# Patient Record
Sex: FEMALE | Race: WHITE | NOT HISPANIC OR LATINO | Employment: STUDENT | URBAN - METROPOLITAN AREA
[De-identification: names, ages, dates, MRNs, and addresses within clinical notes are randomized per-mention and may not be internally consistent; named-entity substitution may affect disease eponyms.]

---

## 2017-01-09 ENCOUNTER — GENERIC CONVERSION - ENCOUNTER (OUTPATIENT)
Dept: OTHER | Facility: OTHER | Age: 22
End: 2017-01-09

## 2017-01-17 ENCOUNTER — APPOINTMENT (OUTPATIENT)
Dept: LAB | Facility: MEDICAL CENTER | Age: 22
End: 2017-01-17
Payer: COMMERCIAL

## 2017-01-17 ENCOUNTER — TRANSCRIBE ORDERS (OUTPATIENT)
Dept: ADMINISTRATIVE | Facility: HOSPITAL | Age: 22
End: 2017-01-17

## 2017-01-17 DIAGNOSIS — E03.9 HYPOTHYROIDISM: ICD-10-CM

## 2017-01-17 LAB — TSH SERPL DL<=0.05 MIU/L-ACNC: 2.06 UIU/ML (ref 0.46–3.98)

## 2017-01-17 PROCEDURE — 84443 ASSAY THYROID STIM HORMONE: CPT

## 2017-01-17 PROCEDURE — 36415 COLL VENOUS BLD VENIPUNCTURE: CPT

## 2017-03-18 ENCOUNTER — OFFICE VISIT (OUTPATIENT)
Dept: URGENT CARE | Facility: MEDICAL CENTER | Age: 22
End: 2017-03-18
Payer: COMMERCIAL

## 2017-03-18 PROCEDURE — G0382 LEV 3 HOSP TYPE B ED VISIT: HCPCS

## 2017-03-18 PROCEDURE — S9083 URGENT CARE CENTER GLOBAL: HCPCS

## 2017-05-18 ENCOUNTER — ALLSCRIPTS OFFICE VISIT (OUTPATIENT)
Dept: OTHER | Facility: OTHER | Age: 22
End: 2017-05-18

## 2017-05-18 ENCOUNTER — TRANSCRIBE ORDERS (OUTPATIENT)
Dept: LAB | Facility: CLINIC | Age: 22
End: 2017-05-18

## 2017-05-18 ENCOUNTER — APPOINTMENT (OUTPATIENT)
Dept: LAB | Facility: CLINIC | Age: 22
End: 2017-05-18
Payer: COMMERCIAL

## 2017-05-18 DIAGNOSIS — R53.83 OTHER FATIGUE: ICD-10-CM

## 2017-05-18 LAB
BASOPHILS # BLD AUTO: 0.07 THOUSANDS/ΜL (ref 0–0.1)
BASOPHILS NFR BLD AUTO: 1 % (ref 0–1)
EOSINOPHIL # BLD AUTO: 0.6 THOUSAND/ΜL (ref 0–0.61)
EOSINOPHIL NFR BLD AUTO: 11 % (ref 0–6)
ERYTHROCYTE [DISTWIDTH] IN BLOOD BY AUTOMATED COUNT: 13.1 % (ref 11.6–15.1)
HCT VFR BLD AUTO: 39.4 % (ref 34.8–46.1)
HGB BLD-MCNC: 12.9 G/DL (ref 11.5–15.4)
LYMPHOCYTES # BLD AUTO: 2.59 THOUSANDS/ΜL (ref 0.6–4.47)
LYMPHOCYTES NFR BLD AUTO: 46 % (ref 14–44)
MCH RBC QN AUTO: 28 PG (ref 26.8–34.3)
MCHC RBC AUTO-ENTMCNC: 32.7 G/DL (ref 31.4–37.4)
MCV RBC AUTO: 86 FL (ref 82–98)
MONOCYTES # BLD AUTO: 0.37 THOUSAND/ΜL (ref 0.17–1.22)
MONOCYTES NFR BLD AUTO: 7 % (ref 4–12)
NEUTROPHILS # BLD AUTO: 1.92 THOUSANDS/ΜL (ref 1.85–7.62)
NEUTS SEG NFR BLD AUTO: 35 % (ref 43–75)
NRBC BLD AUTO-RTO: 0 /100 WBCS
PLATELET # BLD AUTO: 392 THOUSANDS/UL (ref 149–390)
PMV BLD AUTO: 9.3 FL (ref 8.9–12.7)
RBC # BLD AUTO: 4.61 MILLION/UL (ref 3.81–5.12)
VIT B12 SERPL-MCNC: 357 PG/ML (ref 100–900)
WBC # BLD AUTO: 5.55 THOUSAND/UL (ref 4.31–10.16)

## 2017-05-18 PROCEDURE — 85025 COMPLETE CBC W/AUTO DIFF WBC: CPT

## 2017-05-18 PROCEDURE — 82607 VITAMIN B-12: CPT

## 2017-05-18 PROCEDURE — 36415 COLL VENOUS BLD VENIPUNCTURE: CPT

## 2017-05-22 ENCOUNTER — GENERIC CONVERSION - ENCOUNTER (OUTPATIENT)
Dept: OTHER | Facility: OTHER | Age: 22
End: 2017-05-22

## 2017-06-12 ENCOUNTER — GENERIC CONVERSION - ENCOUNTER (OUTPATIENT)
Dept: OTHER | Facility: OTHER | Age: 22
End: 2017-06-12

## 2017-07-10 ENCOUNTER — TRANSCRIBE ORDERS (OUTPATIENT)
Dept: ADMINISTRATIVE | Facility: HOSPITAL | Age: 22
End: 2017-07-10

## 2017-07-10 ENCOUNTER — APPOINTMENT (OUTPATIENT)
Dept: LAB | Facility: MEDICAL CENTER | Age: 22
End: 2017-07-10
Payer: COMMERCIAL

## 2017-07-10 DIAGNOSIS — E03.9 HYPOTHYROIDISM: ICD-10-CM

## 2017-07-10 LAB — TSH SERPL DL<=0.05 MIU/L-ACNC: 0.7 UIU/ML (ref 0.36–3.74)

## 2017-07-10 PROCEDURE — 36415 COLL VENOUS BLD VENIPUNCTURE: CPT

## 2017-07-10 PROCEDURE — 84443 ASSAY THYROID STIM HORMONE: CPT

## 2017-07-11 ENCOUNTER — GENERIC CONVERSION - ENCOUNTER (OUTPATIENT)
Dept: OTHER | Facility: OTHER | Age: 22
End: 2017-07-11

## 2017-07-12 ENCOUNTER — ALLSCRIPTS OFFICE VISIT (OUTPATIENT)
Dept: OTHER | Facility: OTHER | Age: 22
End: 2017-07-12

## 2017-11-08 ENCOUNTER — GENERIC CONVERSION - ENCOUNTER (OUTPATIENT)
Dept: OTHER | Facility: OTHER | Age: 22
End: 2017-11-08

## 2017-11-24 ENCOUNTER — ALLSCRIPTS OFFICE VISIT (OUTPATIENT)
Dept: OTHER | Facility: OTHER | Age: 22
End: 2017-11-24

## 2017-11-24 ENCOUNTER — TRANSCRIBE ORDERS (OUTPATIENT)
Dept: LAB | Facility: CLINIC | Age: 22
End: 2017-11-24

## 2017-11-24 ENCOUNTER — APPOINTMENT (OUTPATIENT)
Dept: LAB | Facility: CLINIC | Age: 22
End: 2017-11-24
Payer: COMMERCIAL

## 2017-11-24 DIAGNOSIS — K31.84 GASTROPARESIS: ICD-10-CM

## 2017-11-24 DIAGNOSIS — K20.0 EOSINOPHILIC ESOPHAGITIS: ICD-10-CM

## 2017-11-24 DIAGNOSIS — G47.00 INSOMNIA: ICD-10-CM

## 2017-11-24 DIAGNOSIS — K58.9 IRRITABLE BOWEL SYNDROME WITHOUT DIARRHEA: ICD-10-CM

## 2017-11-24 DIAGNOSIS — E03.9 HYPOTHYROIDISM: ICD-10-CM

## 2017-11-24 DIAGNOSIS — G47.8 OTHER SLEEP DISORDERS: ICD-10-CM

## 2017-11-24 LAB
ALBUMIN SERPL BCP-MCNC: 3.3 G/DL (ref 3.5–5)
ALP SERPL-CCNC: 76 U/L (ref 46–116)
ALT SERPL W P-5'-P-CCNC: 13 U/L (ref 12–78)
ANION GAP SERPL CALCULATED.3IONS-SCNC: 6 MMOL/L (ref 4–13)
AST SERPL W P-5'-P-CCNC: 11 U/L (ref 5–45)
BILIRUB SERPL-MCNC: 0.26 MG/DL (ref 0.2–1)
BUN SERPL-MCNC: 13 MG/DL (ref 5–25)
CALCIUM SERPL-MCNC: 9.2 MG/DL (ref 8.3–10.1)
CHLORIDE SERPL-SCNC: 104 MMOL/L (ref 100–108)
CO2 SERPL-SCNC: 26 MMOL/L (ref 21–32)
CREAT SERPL-MCNC: 0.78 MG/DL (ref 0.6–1.3)
ERYTHROCYTE [DISTWIDTH] IN BLOOD BY AUTOMATED COUNT: 13.3 % (ref 11.6–15.1)
GFR SERPL CREATININE-BSD FRML MDRD: 109 ML/MIN/1.73SQ M
GLUCOSE SERPL-MCNC: 98 MG/DL (ref 65–140)
HCT VFR BLD AUTO: 38.5 % (ref 34.8–46.1)
HGB BLD-MCNC: 12.8 G/DL (ref 11.5–15.4)
MCH RBC QN AUTO: 27.6 PG (ref 26.8–34.3)
MCHC RBC AUTO-ENTMCNC: 33.2 G/DL (ref 31.4–37.4)
MCV RBC AUTO: 83 FL (ref 82–98)
PLATELET # BLD AUTO: 393 THOUSANDS/UL (ref 149–390)
PMV BLD AUTO: 9.2 FL (ref 8.9–12.7)
POTASSIUM SERPL-SCNC: 4 MMOL/L (ref 3.5–5.3)
PROT SERPL-MCNC: 7.6 G/DL (ref 6.4–8.2)
RBC # BLD AUTO: 4.64 MILLION/UL (ref 3.81–5.12)
SODIUM SERPL-SCNC: 136 MMOL/L (ref 136–145)
T4 FREE SERPL-MCNC: 1.01 NG/DL (ref 0.76–1.46)
TSH SERPL DL<=0.05 MIU/L-ACNC: 2.08 UIU/ML (ref 0.36–3.74)
WBC # BLD AUTO: 6.95 THOUSAND/UL (ref 4.31–10.16)

## 2017-11-24 PROCEDURE — 84439 ASSAY OF FREE THYROXINE: CPT

## 2017-11-24 PROCEDURE — 85027 COMPLETE CBC AUTOMATED: CPT

## 2017-11-24 PROCEDURE — 80053 COMPREHEN METABOLIC PANEL: CPT

## 2017-11-24 PROCEDURE — 84443 ASSAY THYROID STIM HORMONE: CPT

## 2017-11-24 PROCEDURE — 36415 COLL VENOUS BLD VENIPUNCTURE: CPT

## 2017-11-25 NOTE — PROGRESS NOTES
Assessment    1  GERD (gastroesophageal reflux disease) (530 81) (K21 9)   2  IBS (irritable bowel syndrome) (564 1) (K58 9)   3  Gastroparesis (536 3) (K31 84)   4  Sleep paralysis (780 58) (G47 8)   5  Insomnia (780 52) (G47 00)   6  Eosinophilic esophagitis (578 63) (K20 0)   7  Hypothyroidism (244 9) (E03 9)    Plan  Eosinophilic esophagitis, Gastroparesis, GERD (gastroesophageal reflux disease), IBS(irritable bowel syndrome)    · *1 -  [facility] [place]  *  Status:Active  Requested for: 10BMA4807  Care Summary provided  : Yes  Eosinophilic esophagitis, Gastroparesis, Hypothyroidism, IBS (irritable bowel syndrome),Insomnia, Sleep paralysis    · Continue with our present treatment plan ; Status:Complete;   Done: 61Ooq562738:21PM   · Follow-up PRN Evaluation and Treatment  Follow-up  Status: Complete  Done:24Nov2017 12:21PM   · (1) CBC/ PLT (NO DIFF); Status:Active; Requested for:24Nov2017;    · (1) COMPREHENSIVE METABOLIC PANEL; Status:Active; Requested for:24Nov2017;    · (1) T4, FREE; Status:Active; Requested for:24Nov2017;    · (1) TSH; Status:Active; Requested for:24Nov2017; Headache, migraine    · Amitriptyline HCl - 10 MG Oral Tablet  Insomnia, Sleep paralysis    · 1 - Michael Douglas DO  (Neurology) [facility]  *  Status: Active  Requested for:24Nov2017  Care Summary provided  : Yes    Discussion/Summary    1  Insomnia 2  Sleep paralysis, hydroxyzine was ordered  Patient refer to sleep specialist for further evaluation treatmentGERD 4  Gastroparesis 5  IBS 6  Eosinophilic esophagitis, continue present therapy Carafate 1 g at bedtime was ordered patient referred to Gastroenterology for further evaluation and 5th treatmentHypothyroidism, blood work is orderedPatient to return to her usual primary care provider, Dr Bibi Bermudez, after above consultations and blood work is finished  Possible side effects of new medications were reviewed with the patient/guardian today  The treatment plan was reviewed with the patient/guardian  The patient/guardian understands and agrees with the treatment plan     Self Referrals: No      Chief Complaint  pt is here because she states she is having difficulty sleeping   she is having difficulty falling asleep  Yuly Cutting or on the occasions when does fall asleep she has trouble staying asleep   she states she take amitriptyline and wonders if that may be contributing to this   she is also having increased gerd issues   cd      History of Present Illness  HPI: History as above  Patient felt that amitriptyline caused âsleep paralysis  She felt her brain was telling her body to move but it would not  Patient has tried melatonin and Tylenol p m  with limited relief  Patient goes to sleep between 4 and 6:00 a m  and wakes up at 9:00 a m    I explained this is not enough sleep she is at least 7-8 hours nightly  Patient patient states her reflux is getting worse  She has IBS and reflux taking Pepcid 40 mg and dicyclomine with limited relief patient has seen local pediatric gastroenterologist as well as specialists in [place] told she has âgastroparesisâ      Review of Systems   Constitutional: No fever, no chills, feels well, no tiredness, no recent weight gain or loss  ENT: no ear ache, no loss of hearing, no nosebleeds or nasal discharge, no sore throat or hoarseness  Cardiovascular: no complaints of slow or fast heart rate, no chest pain, no palpitations, no leg claudication or lower extremity edema  Respiratory: no complaints of shortness of breath, no wheezing, no dyspnea on exertion, no orthopnea or PND  Breasts: no complaints of breast pain, breast lump or nipple discharge  Gastrointestinal: as noted in HPI  Genitourinary: no complaints of dysuria, no incontinence, no pelvic pain, no dysmenorrhea, no vaginal discharge or abnormal vaginal bleeding    Musculoskeletal: no complaints of arthralgia, no myalgia, no joint swelling or stiffness, no limb pain or swelling  Integumentary: no complaints of skin rash or lesion, no itching or dry skin, no skin wounds  Neurological: as noted in HPI  Active Problems    1  Allergic rhinitis (477 9) (J30 9)   2  Asthma (493 90) (J45 909)   3  Birth control counseling (V25 09) (Z30 09)   4  Chronic constipation (564 00) (K59 09)   5  Dysmenorrhea (625 3) (N94 6)   6  Encounter for contraceptive management (V25 9) (Z30 9)   7  Encounter for gynecological examination without abnormal finding (V72 31) (Z01 419)   8  Eosinophilic esophagitis (936 08) (K20 0)   9  Fatigue (780 79) (R53 83)   10  Gastroparesis (536 3) (K31 84)   11  Generalized anxiety disorder (300 02) (F41 1)   12  Headache, migraine (346 90) (G43 909)   13  Hypothyroidism (244 9) (E03 9)   14  Insomnia (780 52) (G47 00)   15  Need for immunization against influenza (V04 81) (Z23)   16  Palpitations (785 1) (R00 2)   17  Screening for chlamydial disease (V73 98) (Z11 8)   18  Screening for STD (sexually transmitted disease) (V74 5) (Z11 3)   19  Visit for gynecologic examination (V72 31) (Z01 419)    Past Medical History    1  History of Acute maxillary sinusitis (461 0) (J01 00)   2  Allergic rhinitis (477 9) (J30 9)   3  Asthma (493 90) (J45 909)   4  History of Felon (681 01) (L03 019)   5  Headache, migraine (346 90) (G43 909)   6  History of impetigo (V13 3) (Z87 2)   7  History of influenza (V12 09) (Z87 09)   8  History of influenza (V12 09) (Z87 09)   9  History of tinea corporis (V12 09) (Z86 19)   10  History of viral gastroenteritis (V12 09) (Z86 19)   11  History of vomiting (V13 89) (Z87 898)   12  History of Infection of nose (478 19) (J34 89)    Family History  Mother    1  Family history of Breast Cancer (V16 3)   2  Family history of Chronic Constipation   3  Family history of Gastroparesis   4  Family history of Right Hemicolectomy  Father    5  Family history of Raynaud's Phenomenon  Family History Reviewed:    The family history was reviewed and updated today  Social History   · Denied: History of Drug Use   · Living With Parents   · Never a smoker   · Never Drank Alcohol  The social history was reviewed and updated today  Surgical History    1  History of Tonsillectomy With Adenoidectomy  Surgical History Reviewed: The surgical history was reviewed and updated today  Current Meds   1  ALPRAZolam 0 25 MG Oral Tablet; TAKE 1 TABLET  AS NEEDED; Therapy: 11Oca8055 to (Evaluate:91Nsu7590); Last KK:33EEB4570 Ordered   2  Amitriptyline HCl - 10 MG Oral Tablet; TAKE ONE TABLET BY MOUTH AT BEDTIME  Requested for: 12Jun2017; Last Rx:12Jun2017 Ordered   3  Beyaz 3-0 02-0 451 MG Oral Tablet; TAKE 1 TABLET DAILY AS DIRECTED; Therapy: 49Yvv3368 to (Evaluate:89Ivv1544)  Requested for: 25Mif5635; Last Rx:13Txq8432 Ordered   4  Beyaz 3-0 02-0 451 MG Oral Tablet; TAKE 1 TABLET DAILY AS DIRECTED; Therapy: 86DJX2666 to (Evaluate:03Jan2018)  Requested for: 34PEP6490; Last Rx:65Quu9948 Ordered   5  Dicyclomine HCl - 10 MG Oral Capsule; TAKE 1 CAPSULE 4 TIMES DAILY AS NEEDED; Therapy: 83OUA9045 to (Evaluate:16Sep2017)  Requested for: 25PCW8700; Last Rx:91Ftd7111 Ordered   6  Escitalopram Oxalate 10 MG Oral Tablet; Take 1 tablet daily; Therapy: 05JTP6802 to (Evaluate:13May2018)  Requested for: 28TDP1438; Last Rx:39Gqi0756 Ordered   7  Famotidine 40 MG Oral Tablet; Take 1 tablet daily; Therapy: 11NWB4812 to (Evaluate:29Jan2018)  Requested for: 56UAT9928; Last Rx:57Phs8190 Ordered   8  Levothyroxine Sodium 25 MCG Oral Tablet; TAKE 1 TABLET DAILY; Therapy: 08CGG0743 to (Evaluate:19Zkb8225)  Requested for: 52Ekl7664; Last Rx:46Ard5727 Ordered   9  Linzess 290 MCG Oral Capsule; TAKE ONE CAPSULE BY MOUTH EVERY MORNING; Therapy: 26EJV5633 to (Kar Weld)  Requested for: 36IDA5276; Last CS:54YKO5737 Ordered    The medication list was reviewed and updated today  Allergies  1  Augmentin TABS   2   MiraLax POWD    Vitals   Recorded: [id number] 12: 04PM   Heart Rate 68, L Radial   Pulse Quality Regular, L Radial   Systolic 675, LUE, Sitting   Diastolic 80, LUE, Sitting   Height 5 ft    Weight 121 lb 3 2 oz   BMI Calculated 23 67   BSA Calculated 1 51       Physical Exam   Constitutional  General appearance: No acute distress, well appearing and well nourished  Eyes  Conjunctiva and lids: No swelling, erythema or discharge  Ears, Nose, Mouth, and Throat Mucus membranes are moist   Pulmonary  Respiratory effort: No increased work of breathing or signs of respiratory distress  Auscultation of lungs: Clear to auscultation  Cardiovascular  Palpation of heart: Normal PMI, no thrills  Auscultation of heart: Normal rate and rhythm, normal S1 and S2, without murmurs  Abdomen Soft nontender positive bowel sounds  Musculoskeletal  Gait and station: Normal    Skin Warm and dry  Neurologic Negative gross focal motor deficit    Psychiatric  Mood and affect: Normal          Signatures   Electronically signed by : Jojo Pfeiffer DO; Nov 24 2017 12:23PM EST                       (Author)

## 2017-11-27 ENCOUNTER — GENERIC CONVERSION - ENCOUNTER (OUTPATIENT)
Dept: OTHER | Facility: OTHER | Age: 22
End: 2017-11-27

## 2018-01-09 ENCOUNTER — ALLSCRIPTS OFFICE VISIT (OUTPATIENT)
Dept: OTHER | Facility: OTHER | Age: 23
End: 2018-01-09

## 2018-01-09 NOTE — MISCELLANEOUS
Message  per call from pt's father - pt has been in ER @ [facility] 3 x while home from school - racing heart, skipping beat, extreme listlessness  saw cardiologist Dr Shane Domingo who ordered BW  TSH levels out of range  per TH, ok to send results here and he will treat  father's Constanza Malcolm) # [phone]      Active Problems    1  Allergic rhinitis (477 9) (J30 9)   2  Anxiety disorder (300 00) (F41 9)   3  Asthma (493 90) (J45 909)   4  Constipation (564 00) (K59 00)   5  Contusion of foot (924 20) (S90 30XA)   6  Dysmenorrhea (625 3) (N94 6)   7  Encounter for contraceptive management (V25 9) (Z30 9)   8  Eosinophilic esophagitis (890 28) (K20 0)   9  Gastroparesis (536 3) (K31 84)   10  Generalized anxiety disorder (300 02) (F41 1)   11  Insomnia (780 52) (G47 00)   12  Migraine headache (346 90) (G43 909)   13  History of Otitis media, unspecified laterality   14  Screening for chlamydial disease (V73 98) (Z11 8)   15  Visit for gynecologic examination (V72 31) (Z01 419)    Current Meds   1  ALPRAZolam 0 25 MG Oral Tablet (Xanax); TAKE 1 TABLET  AS NEEDED; Therapy: 85Afy5773 to (Evaluate:73Spj0391); Last RH:77WSJ3881 Ordered   2  Amitriptyline HCl - 10 MG Oral Tablet; take 1 tablet at bedtime; Therapy: 07KOH3207 to (Evaluate:26Jan2016)  Requested for: 76ALB6256; Last   Rx:69Hly5884 Ordered   3  Beyaz 3-0 02-0 451 MG Oral Tablet; TAKE 1 TABLET DAILY AS DIRECTED; Therapy: 75HLO4768 to (Evaluate:53Blx3114)  Requested for: 14Bpz4651; Last   Rx:81Gyd1423 Ordered   4  Escitalopram Oxalate 10 MG Oral Tablet; Take 1 tablet daily; Therapy: 44CSK2852 to (Evaluate:81Cvd0383)  Requested for: 10SIC9662; Last   Rx:70Jxj2530 Ordered   5  Linzess CAPS; TAKE ONE TABLET IN THE AM Recorded    Allergies    1   Augmentin TABS    Signatures   Electronically signed by : Marlene Gastelum, ; Jan 20 2016  3:04PM EST                       (Author)

## 2018-01-09 NOTE — RESULT NOTES
Verified Results  (1) TSH 19RET6296 05:44PM Trudy Suarez    Order Number: [id number]     Test Name Result Flag Reference   TSH 0 699 uIU/mL  0 358-3 740   - Patient Instructions: This bloodwork is non-fasting  Please drink two glasses of water morning of bloodwork  Patients undergoing fluorescein dye angiography may retain small amounts of fluorescein in the body for 48-72 hours post procedure  Samples containing fluorescein can produce falsely depressed TSH values  If the patient had this procedure,a specimen should be resubmitted post fluorescein clearance            The recommended reference ranges for TSH during pregnancy are as follows:  First trimester 0 1 to 2 5 uIU/mL  Second trimester  0 2 to 3 0 uIU/mL  Third trimester 0 3 to 3 0 uIU/m

## 2018-01-10 NOTE — CONSULTS
Assessment   1  GERD (gastroesophageal reflux disease) (530 81) (K21 9)   2  Esophageal dysphagia (787 20) (R13 10)   3  Dehydration (276 51) (E86 0)    Plan   Dehydration    · 1 - Hawa Soto (Nephrology) [facility]  *  Status: Active  Requested for:    14QZR2067   Ordered; For: Dehydration; Ordered By: Carly Hicks Performed:  Due: 24KHI8255; Last Updated By: Adilene Mays; 1/9/2018 11:18:55 AM  Care Summary provided  : Yes  Esophageal dysphagia    · EGD; Status:Active; Requested SGC:38HTR7405; Perform:[facility]; XQM:66VGJ1680; Last Updated By:Kimo Eric; 1/9/2018 11:18:42 AM;Ordered;For:Esophageal dysphagia; Ordered By:Ignacio Riley;    Discussion/Summary   Discussion Summary:    1  GERD  Her reflux symptoms are not well controlled  she is currently on Pepcid and Carafate  I will add Pantoprazole to take in the morning, Pepcid will be taken in the evening  If symptoms continue to not be well controlled, advised to add Carafate  She experienced palpitations with Ompeprazole  She was advised to contact our office if these symptoms arise while taking newly prescribed medication  Continue reflux precautions  Rashard Lucas, this is likely due to underlying eosinophilic esophagitis  Will will treat her with Pantoprazole for two months and will perform EGD in two months with random biopsy taken  IBS_C and ?gastroparesis - she is on linzess 290 mcg daily  BM Q 5 days  Gastroparesis symptoms are well controlled off meds for nephrology given to further evaluation dehydration  Follow up in our office in three months  Counseling Documentation With Imm: The patient was counseled regarding  Chief Complaint   Chief Complaint Free Text Note Form: pt consult for abdominal pain, abdominal bloating  constipation, diarrhea, nausea, vomiting, acid reflux, loss of appetite, trouble swallowing   Referred by Dr Meghan Huerta      History of Present Illness   HPI: 25year old female presents to the office today as a referral from Dr Irais Emery  Last seen at [facility] on 1/9/2017  She has multiple GI complaints including constipation, abdominal pain, bloating, nausea for several years  emptying study in 2013 was normal  However she had vomited part of the test meal, invalidating the test     pH test was normal     2014 was normal  Biopsy from esophagus showed 14 to 18 eosinophils, consistent with eosinophilic esophagitis  Colonoscopy on March 2014 with poor prep, otherwise unremarkable  Biopsy at that time unremarkable  eosinophilic esophagitis, she was on Budesonide 1mg, twice daily for 2-3 weeks, without change in symptoms  was also empirically treated with Rifaxamin, no effect on her GI symptoms  She then tried Domperidone which initially helped with her symptoms but lost response  Colchicine was also tried with magnesium gluconate but no effect  this present time, patient repots her GERD has been worsening  She has been taking Carafate nightly which has been helping her symptoms sightly  States she feels like she gets bloated after eating and experiences reflux  Symptoms are typically worse at night  She also feels as if food gets stuck when swallowing solid foods  She does not notice any specific triggers with foods, but notes she is sensitive to spicy foods  constipation has improved with use of Linzess mediation  Estimates he has one BM every four to five days  patient, she experiences dehydration, and typically has to receive IV fluids monthly  She estimates she drinks 4-6 16 ounce bottles of water daily but continues to get dehydrated  She denies nausea, vomiting, and any other s/s a this time  History Reviewed: The history was obtained today from the patient and I agree with the documented history        Review of Systems   Complete-Female GI Adult:      Constitutional: feeling poorly,-- feeling tired-- and-- night sweats, but-- no fever,-- no recent weight gain,-- no chills-- and-- no recent weight loss  Eyes: No complaints of eye pain, no red eyes, no eyesight problems, no discharge, no dry eyes, no itching of eyes  ENT: no complaints of earache, no loss of hearing, no nose bleeds, no nasal discharge, no sore throat, no hoarseness  Cardiovascular: No complaints of slow heart rate, no fast heart rate, no chest pain, no palpitations, no leg claudication, no lower extremity edema  Respiratory: No complaints of shortness of breath, no wheezing, no cough, no SOB on exertion, no orthopnea, no PND  Gastrointestinal: abdominal pain,-- nausea,-- vomiting,-- constipation,-- diarrhea-- and-- acid reflux, loss of appetite, trouble swallowing, but-- no blood in stools  Genitourinary: No complaints of dysuria, no incontinence, no pelvic pain, no dysmenorrhea, no vaginal discharge or bleeding  Musculoskeletal: No complaints of arthralgias, no myalgias, no joint swelling or stiffness, no limb pain or swelling  Integumentary: No complaints of skin rash or lesions, no itching, no skin wounds, no breast pain or lump  Neurological: No complaints of headache, no confusion, no convulsions, no numbness, no dizziness or fainting, no tingling, no limb weakness, no difficulty walking  Psychiatric: Not suicidal, no sleep disturbance, no anxiety or depression, no change in personality, no emotional problems  Endocrine: No complaints of proptosis, no hot flashes, no muscle weakness, no deepening of the voice, no feelings of weakness  Hematologic/Lymphatic: No complaints of swollen glands, no swollen glands in the neck, does not bleed easily, does not bruise easily  Active Problems   1  Allergic rhinitis (477 9) (J30 9)   2  Asthma (493 90) (J45 909)   3  Birth control counseling (V25 09) (Z30 09)   4  Chronic constipation (564 00) (K59 09)   5  Dysmenorrhea (625 3) (N94 6)   6  Encounter for contraceptive management (V25 9) (Z30 9)   7  Encounter for gynecological examination without abnormal finding (V72 31) (Z01 419)   8  Eosinophilic esophagitis (741 66) (K20 0)   9  Fatigue (780 79) (R53 83)   10  Gastroparesis (536 3) (K31 84)   11  Generalized anxiety disorder (300 02) (F41 1)   12  GERD (gastroesophageal reflux disease) (530 81) (K21 9)   13  Headache, migraine (346 90) (G43 909)   14  Hypothyroidism (244 9) (E03 9)   15  IBS (irritable bowel syndrome) (564 1) (K58 9)   16  Insomnia (780 52) (G47 00)   17  Need for immunization against influenza (V04 81) (Z23)   18  Palpitations (785 1) (R00 2)   19  Screening for chlamydial disease (V73 98) (Z11 8)   20  Screening for STD (sexually transmitted disease) (V74 5) (Z11 3)   21  Sleep paralysis (780 58) (G47 8)   22  Visit for gynecologic examination (V72 31) (Z01 419)    Past Medical History   1  History of Acute maxillary sinusitis (461 0) (J01 00)   2  Allergic rhinitis (477 9) (J30 9)   3  Asthma (493 90) (J45 909)   4  History of Felon (681 01) (L03 019)   5  Headache, migraine (346 90) (G43 909)   6  History of impetigo (V13 3) (Z87 2)   7  History of influenza (V12 09) (Z87 09)   8  History of influenza (V12 09) (Z87 09)   9  History of tinea corporis (V12 09) (Z86 19)   10  History of viral gastroenteritis (V12 09) (Z86 19)   11  History of vomiting (V13 89) (Z87 898)   12  History of Infection of nose (478 19) (J34 89)  Active Problems And Past Medical History Reviewed: The active problems and past medical history were reviewed and updated today  Surgical History   1  History of Tonsillectomy With Adenoidectomy  Surgical History Reviewed: The surgical history was reviewed and updated today  Family History   Mother    1  Family history of Breast Cancer (V16 3)   2  Family history of Chronic Constipation   3  Family history of Gastroparesis   4  Family history of Right Hemicolectomy  Father    5  Family history of Raynaud's Phenomenon  Family History Reviewed:     The family history was reviewed and updated today  Social History    · Denied: History of Drug Use   · Living With Parents   · Never a smoker   · Never Drank Alcohol  Social History Reviewed: The social history was reviewed and updated today  The social history was reviewed and is unchanged  Current Meds    1  ALPRAZolam 0 25 MG Oral Tablet; TAKE 1 TABLET  AS NEEDED; Therapy: 89Utq0071 to (Evaluate:72Huc3394); Last OJ:80AKZ3284 Ordered   2  Beyaz 3-0 02-0 451 MG Oral Tablet; TAKE 1 TABLET DAILY AS DIRECTED; Therapy: 57Ttj4768 to (Evaluate:99Zas3042)  Requested for: 40Dvo7544; Last     Rx:75Ayp1320 Ordered   3  Beyaz 3-0 02-0 451 MG Oral Tablet; TAKE 1 TABLET DAILY AS DIRECTED; Therapy: 70JDE3155 to (Evaluate:03Jan2018)  Requested for: 32JWG6393; Last     Rx:74Cyr9083 Ordered   4  Dicyclomine HCl - 10 MG Oral Capsule; TAKE 1 CAPSULE 4 TIMES DAILY AS NEEDED; Therapy: 26WWT2187 to (Evaluate:16Sep2017)  Requested for: 04IVT6709; Last     Rx:54Glv4272 Ordered   5  Escitalopram Oxalate 10 MG Oral Tablet; Take 1 tablet daily; Therapy: 67GZP7744 to (Evaluate:48Apm1707)  Requested for: 17SYO8424; Last     Rx:03Ejs7224 Ordered   6  Famotidine 40 MG Oral Tablet; Take 1 tablet daily; Therapy: 17GUP1539 to (Evaluate:29Jan2018)  Requested for: 66HHL0910; Last     Rx:60Ojp3499 Ordered   7  HydrOXYzine HCl - 10 MG Oral Tablet; TAKE 1 TABLET AT BEDTIME AS NEEDED FOR     SLEEP; Therapy: 99RAK1078 to (Last Rx:24Nov2017)  Requested for: 12JLT6995 Ordered   8  Levothyroxine Sodium 25 MCG Oral Tablet; TAKE 1 TABLET DAILY; Therapy: 87PVW2266 to (Evaluate:92Otq7266)  Requested for: 53Saf2944; Last     Rx:55Cnz8249 Ordered   9  Linzess 290 MCG Oral Capsule; TAKE ONE CAPSULE BY MOUTH EVERY MORNING; Therapy: 36SGT4420 to (Lesly Tiffanie)  Requested for: 00BRW7938; Last     WS:99ZIQ2309 Ordered   10  Sucralfate 1 GM Oral Tablet; TAKE 1 TABLET AT BEDTIME;       Therapy: 40AHR4777 to (Last BS:10AUJ4711)  Requested for: 95HVC9077 Ordered    Allergies   1  Amitriptyline HCl TABS   2  Augmentin TABS   3  MiraLax POWD   4  Omeprazole TBEC    Vitals   Vital Signs    Recorded: 02XYF9433 10:35AM   Temperature 98 F, Oral   Heart Rate 74   Systolic 309, LUE, Sitting   Diastolic 62, LUE, Sitting   Height 5 ft    Weight 125 lb 6 0 oz   BMI Calculated 24 49   BSA Calculated 1 53   O2 Saturation 98, RA     Physical Exam        Constitutional      General appearance: No acute distress, well appearing and well nourished  Eyes      Conjunctiva and lids: No swelling, erythema or discharge  Pupils and irises: Equal, round and reactive to light  Ears, Nose, Mouth, and Throat      External inspection of ears and nose: Normal        Nasal mucosa, septum, and turbinates: Normal without edema or erythema  Oropharynx: Normal with no erythema, edema, exudate or lesions  Pulmonary      Respiratory effort: No increased work of breathing or signs of respiratory distress  Auscultation of lungs: Clear to auscultation  Cardiovascular      Auscultation of heart: Normal rate and rhythm, normal S1 and S2, without murmurs  Abdomen      Abdomen: Non-tender, no masses  Liver and spleen: No hepatomegaly or splenomegaly  Lymphatic      Palpation of lymph nodes in neck: No lymphadenopathy  Musculoskeletal      Gait and station: Normal        Skin      Skin and subcutaneous tissue: Normal without rashes or lesions  Psychiatric      Orientation to person, place, and time: Normal        Mood and affect: Normal           Attending Note   Scribe Attestation:      Scribe Attestation Bryan BATRES am acting as a scribe in the presence of the attending physician while the attending physician examines the patient        Physician Attestation:      I, Dr Faiza Wolfe personally performed the services described in this documentation as scribed in my presence, and it is both accurate and complete        Future Appointments      Date/Time Provider Specialty Site   03/15/2018 07:30 AM Mallory Awad MD Gastroenterology Adult [facility] [address] [place] [address]     Signatures    Electronically signed by : Brooklynn Angulo MD; Jan 9 2018  2:17PM EST                       (Author)

## 2018-01-11 NOTE — MISCELLANEOUS
Message  Patient's mother was in the office today  She mentioned that the patient has a significant amount of stomach upset, i e  increased bloating and gas  She wondered about whether or not patient can take omeprazole  Confirm that we may speak with her, and then reviewed the chart, as well as discussing the fact that omeprazole would probably be reasonable  She has not been on it prior, so recommend starting with 20 mg daily  Further, I did agree that changing from nortriptyline back to amitriptyline could be done  Plan  Chronic constipation    · Nortriptyline HCl - 10 MG Oral Capsule  Gastroparesis    · Omeprazole 20 MG Oral Capsule Delayed Release; take 1 capsule daily  Headache, migraine    · From  Amitriptyline HCl - 25 MG Oral Tablet TAKE 1 TABLET AT BEDTIME To  Amitriptyline HCl - 10 MG Oral Tablet TAKE ONE TABLET BY MOUTH AT BEDTIME    Signatures   Electronically signed by :  NOEMI Villalobos ; Nikhil 15 2017 10:35AM EST

## 2018-01-11 NOTE — PROGRESS NOTES
History of Present Illness  Care Coordination Encounter Information:   Type of Encounter: Telephonic   Contact: Initial Contact   Last Office Visit: 2/10/16   Spoke to Patient  Care Coordination [facility] Nurse Ginny Gray:   The reason for call is to discuss coordination of meeting care plan treatment goals  I left a message for patient to call me back on 3/14/16  Patient returned my call on 3/15/16 at 3:00 pm  Aware I was calling to follow up with her to see how she is feeling since her last ER visit on 1/13/16 for palpitations  Patient relates she is doing a lot better no issues or concerns at present time  Relates "I am back at [employer] now at [facility] " Patient aware she can call the practice if she needs to be seen or she can be at [place] [address] [facility]  Patient is ok with me mailing out information about [facility]  Patient is also on a report for [facility] for high ER utilization with 10 visits to the ER  Active Problems    1  Allergic rhinitis (477 9) (J30 9)   2  Anxiety disorder (300 00) (F41 9)   3  Asthma (493 90) (J45 909)   4  Constipation (564 00) (K59 00)   5  Contusion of foot (924 20) (S90 30XA)   6  Dysmenorrhea (625 3) (N94 6)   7  Encounter for contraceptive management (V25 9) (Z30 9)   8  Eosinophilic esophagitis (749 79) (K20 0)   9  Gastroparesis (536 3) (K31 84)   10  Generalized anxiety disorder (300 02) (F41 1)   11  Hypothyroid (244 9) (E03 9)   12  Insomnia (780 52) (G47 00)   13  Migraine headache (346 90) (G43 909)   14  History of Otitis media, unspecified laterality   15  Screening for chlamydial disease (V73 98) (Z11 8)   16  TSH elevation (794 5) (R94 6)   17  Visit for gynecologic examination (V72 31) (Z01 419)    Past Medical History    1  History of Acute maxillary sinusitis (461 0) (J01 00)   2  Allergic rhinitis (477 9) (J30 9)   3  Asthma (493 90) (J45 909)   4  History of Felon (681 01) (L03 019)   5  History of impetigo (V13 3) (Z87 2)   6   History of influenza (V12 09) (Z87 09)   7  History of influenza (V12 09) (Z87 09)   8  History of viral gastroenteritis (V12 09) (Z86 19)   9  History of vomiting (V13 89) (Z87 898)   10  Migraine headache (346 90) (G43 909)   11  History of Otitis media, unspecified laterality    Surgical History    1  History of Tonsillectomy With Adenoidectomy    Family History    1  Family history of Breast Cancer (V16 3)   2  Family history of Chronic Constipation   3  Family history of Gastroparesis   4  Family history of Right Hemicolectomy    5  Family history of Raynaud's Phenomenon    Social History    · Denied: History of Drug Use   · Living With Parents   · Never A Smoker   · Never Drank Alcohol    Current Meds    1  ALPRAZolam 0 25 MG Oral Tablet (Xanax); TAKE 1 TABLET  AS NEEDED; Therapy: 48Ghh5551 to (Evaluate:52Ibm6861); Last UQ:37VFA5458 Ordered    2  Beyaz 3-0 02-0 451 MG Oral Tablet; TAKE 1 TABLET DAILY AS DIRECTED; Therapy: 39TXA3643 to (Evaluate:00Ooi8275)  Requested for: 06Tvo2362; Last   Rx:25Ens7036 Ordered    3  Levothyroxine Sodium 25 MCG Oral Tablet; TAKE 1 TABLET DAILY; Therapy: 15OKU0712 to (Evaluate:16Mar2016)  Requested for: 83KZY1514; Last   Rx:09Mar2016 Ordered   4  Levothyroxine Sodium 25 MCG Oral Tablet; TAKE 1 TABLET DAILY; Therapy: 25XAR2368 to (Last Rx:10Feb2016)  Requested for: 42MAW3567 Ordered    5  Escitalopram Oxalate 20 MG Oral Tablet; Take 1 tablet daily; Therapy: 39BQG6109 to (Evaluate:77Ysi7924)  Requested for: 22NLM4649; Last   Rx:94Fmd4278 Ordered    6  Amitriptyline HCl - 10 MG Oral Tablet; take 1 tablet at bedtime; Therapy: 40NGD3803 to (Evaluate:26Jan2016)  Requested for: 19EPY3038; Last   Rx:89Hjx0246 Ordered    7  Linzess CAPS; TAKE ONE TABLET IN THE AM Recorded    Allergies    1  Augmentin TABS    Health Management   (1) CHLAMYDIA/GC AMPLIFIED DNA, PCR; every 1 year; Next Due: 14DLK5920; Overdue   Pediatric / Adolescent Wellness Visit; every 1 year;  Next Due: [id number]; Overdue    End of Encounter Meds    1  ALPRAZolam 0 25 MG Oral Tablet (Xanax); TAKE 1 TABLET  AS NEEDED; Therapy: 43Pcc5883 to (Evaluate:86Jdc5087); Last FH:29MAZ0778 Ordered    2  Beyaz 3-0 02-0 451 MG Oral Tablet; TAKE 1 TABLET DAILY AS DIRECTED; Therapy: 90NNY0440 to (Evaluate:91Ldh0312)  Requested for: 23Bkx7708; Last   Rx:27Fqo0695 Ordered    3  Levothyroxine Sodium 25 MCG Oral Tablet; TAKE 1 TABLET DAILY; Therapy: 64QWQ3736 to (Evaluate:16Mar2016)  Requested for: 05SWN6765; Last   Rx:09Mar2016 Ordered   4  Levothyroxine Sodium 25 MCG Oral Tablet; TAKE 1 TABLET DAILY; Therapy: 43RAQ9329 to (Last Rx:77Luj7770)  Requested for: 79CHP9655 Ordered    5  Escitalopram Oxalate 20 MG Oral Tablet; Take 1 tablet daily; Therapy: 88CID1495 to (Evaluate:37Kgv5368)  Requested for: 11VRE1082; Last   Rx:34Wja4159 Ordered    6  Amitriptyline HCl - 10 MG Oral Tablet; take 1 tablet at bedtime; Therapy: 96RUU7401 to (Evaluate:26Jan2016)  Requested for: 01XJX4034; Last   Rx:12Dmv9442 Ordered    7  Linzess CAPS; TAKE ONE TABLET IN THE AM Recorded    Message   Recorded as Task   Date: 01/25/2016 01:33 PM, Created By: Yen Higgins   Task Name: Review Document   Assigned To: Jose Choudhary   Regarding Patient: Veronique Tenorio, Status: In Progress   Comment:    Yen Higgins - 25 Jan 2016 1:33 PM     TASK CREATED  Miguelito Side patient has been to ED 10 times in last year and most recently 1/13/16 for palpitations  Please reach out to patient and assess progress since and need for f/u  Thanks you! Ancelmo Reyes - 25 Jan 2016 4:23 PM     TASK IN PROGRESS   Noelle Mckee - 16 Feb 2016 1:37 PM     TASK EDITED  2/16/16 patient called and left a general message for a return call  Malathi Polanco - 53 Mar 2016 1:42 PM     TASK EDITED  3/3/16 2nd attempt to connect with patient to see how she is doing  I had previously left a message, but did not receive a return call      Malathi Polanco - 14 Mar 2016 1:00 PM     TASK EDITED  3/14/16 at 12:57 pm patient called left a message for a return call  Calling to see how patient is doing since her recent ER visit for palpitations  Patient is on [employer] report for high ER utilization  Cameron Samuel RN BSN  Care Coordinator     Patient Care Team    Care Team Member Role Specialty Office Number   Romana Mylaadeola MOE    Gastroenterology Peds [phone]   Jorge Thakkar MD  Obstetrics/Gynecology [phone]   [place] [address] [phone]     Signatures   Electronically signed by : Cameron Samuel RN; Mar 15 2016  3:14PM EST                       (Author)    Electronically signed by : Cameron Samuel RN; Mar 15 2016  3:24PM EST                       (Author)

## 2018-01-12 NOTE — MISCELLANEOUS
Message  Phone call rec'd from pt's father, Adolfo Hicks: He reports pt took the increased dose of Lexapro today as prescribed by [facility] [place] yesterday and about 1/2 hour later began to experience palpitations and racing heart  These symptoms have continued and pt is not sure what to do  Wondering if she can take Xanax which is prescribed prn  Per TS , recommendation was made to father to take pt to ER for eval since symptoms have been going on for several hours now  Pt may also take Xanax as prescribed  Father agreed and states that is what they will do  --bb      Active Problems    1  Allergic rhinitis (477 9) (J30 9)   2  Anxiety disorder (300 00) (F41 9)   3  Asthma (493 90) (J45 909)   4  Constipation (564 00) (K59 00)   5  Contusion of foot (924 20) (S90 30XA)   6  Dysmenorrhea (625 3) (N94 6)   7  Encounter for contraceptive management (V25 9) (Z30 9)   8  Eosinophilic esophagitis (899 15) (K20 0)   9  Gastroparesis (536 3) (K31 84)   10  Generalized anxiety disorder (300 02) (F41 1)   11  Hypothyroid (244 9) (E03 9)   12  Insomnia (780 52) (G47 00)   13  Migraine headache (346 90) (G43 909)   14  History of Otitis media, unspecified laterality   15  Screening for chlamydial disease (V73 98) (Z11 8)   16  TSH elevation (794 5) (R94 6)   17  Visit for gynecologic examination (V72 31) (Z01 419)    Current Meds   1  ALPRAZolam 0 25 MG Oral Tablet (Xanax); TAKE 1 TABLET  AS NEEDED; Therapy: 84Xiv3774 to (Evaluate:29Jgb0304); Last K43AAV9005 Ordered   2  Amitriptyline HCl - 10 MG Oral Tablet; take 1 tablet at bedtime; Therapy: 24DYY7351 to (Evaluate:2016)  Requested for: 15SKI2648; Last   Rx:70Cnx5382 Ordered   3  Beyaz 3-0 02-0 451 MG Oral Tablet; TAKE 1 TABLET DAILY AS DIRECTED; Therapy: 78MSR0670 to (Evaluate:17Edv3641)  Requested for: 57Gve1110; Last   Rx:92Mre8557 Ordered   4  Escitalopram Oxalate 20 MG Oral Tablet; Take 1 tablet daily;    Therapy: 59NWX0094 to (Evaluate:64Ryl7567)  Requested for: 93LAM5608; Last   Rx:54Fls0964 Ordered   5  Levothyroxine Sodium 25 MCG Oral Tablet; TAKE 1 TABLET DAILY; Therapy: 05ZSM0639 to (Evaluate:69Ibx1759)  Requested for: 60NLR8092; Last   Rx:09Zts3744 Ordered   6  Levothyroxine Sodium 25 MCG Oral Tablet; TAKE 1 TABLET DAILY; Therapy: 97EXQ1536 to (Last Rx:84Wgc0447)  Requested for: 16VPE5526 Ordered   7  Linzess CAPS; TAKE ONE TABLET IN THE AM Recorded    Allergies    1   Augmentin TABS    Signatures   Electronically signed by : Addison Prasad, ; Feb 11 2016  5:02PM EST                       (Author)

## 2018-01-13 VITALS
HEART RATE: 80 BPM | WEIGHT: 123.2 LBS | SYSTOLIC BLOOD PRESSURE: 96 MMHG | BODY MASS INDEX: 24.19 KG/M2 | HEIGHT: 60 IN | DIASTOLIC BLOOD PRESSURE: 62 MMHG

## 2018-01-13 VITALS
HEIGHT: 60 IN | RESPIRATION RATE: 16 BRPM | WEIGHT: 119.5 LBS | BODY MASS INDEX: 23.46 KG/M2 | SYSTOLIC BLOOD PRESSURE: 94 MMHG | DIASTOLIC BLOOD PRESSURE: 60 MMHG | HEART RATE: 80 BPM

## 2018-01-13 VITALS
HEIGHT: 60 IN | SYSTOLIC BLOOD PRESSURE: 118 MMHG | HEART RATE: 68 BPM | BODY MASS INDEX: 23.8 KG/M2 | WEIGHT: 121.2 LBS | DIASTOLIC BLOOD PRESSURE: 80 MMHG

## 2018-01-15 NOTE — RESULT NOTES
Verified Results  (1) CBC/ PLT (NO DIFF) 71QUF4342 12:30PM Ruba Caceres Order Number: [id number]     Test Name Result Flag Reference   HEMATOCRIT 38 5 %  34 8-46 1   HEMOGLOBIN 12 8 g/dL  11 5-15 4   MCHC 33 2 g/dL  31 4-37 4   MCH 27 6 pg  26 8-34 3   MCV 83 fL  82-98   PLATELET COUNT 651 Thousands/uL H 149-390   RBC COUNT 4 64 Million/uL  3 81-5 12   RDW 13 3 %  11 6-15 1   WBC COUNT 6 95 Thousand/uL  4 31-10 16   MPV 9 2 fL  8 9-12 7     (1) COMPREHENSIVE METABOLIC PANEL 88ZXW6908 40:69RQ Ruba Caceres Order Number: [id number]     Test Name Result Flag Reference   GLUCOSE,RANDM 98 mg/dL     If the patient is fasting, the ADA then defines impaired fasting glucose as > 100 mg/dL and diabetes as > or equal to 123 mg/dL  Specimen collection should occur prior to Sulfasalazine administration due to the potential for falsely depressed results  Specimen collection should occur prior to Sulfapyridine administration due to the potential for falsely elevated results  SODIUM 136 mmol/L  136-145   POTASSIUM 4 0 mmol/L  3 5-5 3   CHLORIDE 104 mmol/L  100-108   CARBON DIOXIDE 26 mmol/L  21-32   ANION GAP (CALC) 6 mmol/L  4-13   BLOOD UREA NITROGEN 13 mg/dL  5-25   CREATININE 0 78 mg/dL  0 60-1 30   Standardized to IDMS reference method   CALCIUM 9 2 mg/dL  8 3-10 1   BILI, TOTAL 0 26 mg/dL  0 20-1 00   ALK PHOSPHATAS 76 U/L     ALT (SGPT) 13 U/L  12-78   Specimen collection should occur prior to Sulfasalazine and/or Sulfapyridine administration due to the potential for falsely depressed results  AST(SGOT) 11 U/L  5-45   Specimen collection should occur prior to Sulfasalazine administration due to the potential for falsely depressed results     ALBUMIN 3 3 g/dL L 3 5-5 0   TOTAL PROTEIN 7 6 g/dL  6 4-8 2   eGFR 109 ml/min/1 73sq m     National Kidney Disease Education Program recommendations are as follows:  GFR calculation is accurate only with a steady state creatinine  Chronic Kidney disease less than 60 ml/min/1 73 sq  meters  Kidney failure less than 15 ml/min/1 73 sq  meters  (1) TSH 69RBK2395 12:30PM Elle Martínez Order Number: [id number]     Test Name Result Flag Reference   TSH 2 080 uIU/mL  0 358-3 740   Patients undergoing fluorescein dye angiography may retain small amounts of fluorescein in the body for 48-72 hours post procedure  Samples containing fluorescein can produce falsely depressed TSH values  If the patient had this procedure,a specimen should be resubmitted post fluorescein clearance  The recommended reference ranges for TSH during pregnancy are as follows:  First trimester 0 1 to 2 5 uIU/mL  Second trimester  0 2 to 3 0 uIU/mL  Third trimester 0 3 to 3 0 uIU/m     (1) T4, FREE 24Nov2017 12:30PM Elle Martínez Order Number: [id number]     Test Name Result Flag Reference   T4,FREE 1 01 ng/dL  0 76-1 46   Specimen collection should occur prior to Sulfasalazine administration due to the potential for falsely elevated results

## 2018-01-16 ENCOUNTER — ALLSCRIPTS OFFICE VISIT (OUTPATIENT)
Dept: OTHER | Facility: OTHER | Age: 23
End: 2018-01-16

## 2018-01-16 NOTE — RESULT NOTES
Verified Results  (1) CBC/PLT/DIFF 76NPV4365 11:56AM Daune Nyhan    Order Number: [id number]     Test Name Result Flag Reference   WBC COUNT 5 55 Thousand/uL  4 31-10 16   RBC COUNT 4 61 Million/uL  3 81-5 12   HEMOGLOBIN 12 9 g/dL  11 5-15 4   HEMATOCRIT 39 4 %  34 8-46  1   MCV 86 fL  82-98   MCH 28 0 pg  26 8-34 3   MCHC 32 7 g/dL  31 4-37 4   RDW 13 1 %  11 6-15 1   MPV 9 3 fL  8 9-12 7   PLATELET COUNT 560 Thousands/uL H 149-390   nRBC AUTOMATED 0 /100 WBCs     NEUTROPHILS RELATIVE PERCENT 35 % L 43-75   LYMPHOCYTES RELATIVE PERCENT 46 % H 14-44   MONOCYTES RELATIVE PERCENT 7 %  4-12   EOSINOPHILS RELATIVE PERCENT 11 % H 0-6   BASOPHILS RELATIVE PERCENT 1 %  0-1   NEUTROPHILS ABSOLUTE COUNT 1 92 Thousands/? ??L  1 85-7 62   LYMPHOCYTES ABSOLUTE COUNT 2 59 Thousands/? ??L  0 60-4 47   MONOCYTES ABSOLUTE COUNT 0 37 Thousand/? ??L  0 17-1 22   EOSINOPHILS ABSOLUTE COUNT 0 60 Thousand/? ??L  0 00-0 61   BASOPHILS ABSOLUTE COUNT 0 07 Thousands/? ??L  0 00-0 10     (1) VITAMIN B12 65TFX1592 11:56AM Warner Dhaval Order Number: [id number]     Test Name Result Flag Reference   VITAMIN B12 357 pg/mL  100-900       Plan  Chronic constipation    · Linzess 290 MCG Oral Capsule; TAKE ONE CAPSULE BY MOUTH EVERY  MORNING

## 2018-01-17 NOTE — PROGRESS NOTES
History of Present Illness  ED Outreach:   ED Visit Information  ED visit date: 10/24/2017   Diagnosis description: WEAKNESS   Facility name: [facility]   Discharge status: Home  Number of ED visits to date: 4  ED severity: 3  In network  Outreach Information  Outreach not needed  Date finalized: 11/08/2017  Care Coordination  [facility] PCP  Patient went out of network - proximity  Pt not admitted from ED  Comments:   Patient was out of the area  Records requested  Signatures   Electronically signed by : Pinkey Canavan, ; Nov 8 2017  9:05AM EST                       (Author)    Electronically signed by :  Pinkey Canavan, ; Nov 16 2017 10:32AM EST                       (Author)

## 2018-01-18 NOTE — PROGRESS NOTES
Assessment   1  Nausea (787 02) (R11 0)   2  Dehydration (276 51) (E86 0)   3  Dizziness, nonspecific (780 4) (R42)    Plan   Dehydration, Dizziness, nonspecific, Nausea    · *1 -  [facility]  *  Status:    Active  Requested for: 90MCE7418  Care Summary provided  : Yes    Discussion/Summary      #1  Recurring dehydration-etiology unknown-referral to Gastroenterology  Dizziness-continues  Nausea-continues  evaluate why the patient is getting recurrent dehydration  with GI  Possible side effects of new medications were reviewed with the patient/guardian today  The treatment plan was reviewed with the patient/guardian  The patient/guardian understands and agrees with the treatment plan       Self Referrals: No      Chief Complaint   Follow up to ER - pt was taken to [facility] ER 10 Arsenio- dizzy, nauseous  Dx'd dehydration - was given fluids and released  Pt still felt nauseous and returned to ER and was given more fluids on 11 Jan  Pt states this occurs frequently and is concerned what is causing dehydration - nausea, body aches, dizziness  - [facility]      History of Present Illness   This is a 80-year-old female who comes in following 2 visits to the ER for dehydration, nausea and dizziness  She currently is experiencing the same symptoms after getting hydration in the emergency room  She sees GI and continues to get dehydrated  She has no abdominal pain, vomiting or diarrhea  She is currently on Linzess, pantoprazole, and Bentyl p r n  Da Manifold She is scheduled to go back to school tomorrow and is requesting to go back to GI for further evaluation and treatment  Review of Systems        Constitutional: feeling poorly-- and-- feeling tired, but-- as noted in HPI,-- no fever-- and-- no chills  ENT: no complaints of earache, no loss of hearing, no nose bleeds, no nasal discharge, no sore throat, no hoarseness        Respiratory: No complaints of shortness of breath, no wheezing, no cough, no SOB on exertion, no orthopnea, no PND  Gastrointestinal: as noted in HPI,-- no abdominal pain,-- no vomiting,-- no constipation,-- no diarrhea-- and-- no blood in stools--       The patient presents with complaints of gradual onset of constant episodes of moderate nausea  Genitourinary: No complaints of dysuria, no incontinence, no pelvic pain, no dysmenorrhea, no vaginal discharge or bleeding  ROS reviewed  Active Problems   1  Allergic rhinitis (477 9) (J30 9)   2  Asthma (493 90) (J45 909)   3  Birth control counseling (V25 09) (Z30 09)   4  Chronic constipation (564 00) (K59 09)   5  Dehydration (276 51) (E86 0)   6  Dysmenorrhea (625 3) (N94 6)   7  Encounter for contraceptive management (V25 9) (Z30 9)   8  Encounter for gynecological examination without abnormal finding (V72 31) (Z01 419)   9  Eosinophilic esophagitis (053 58) (K20 0)   10  Esophageal dysphagia (787 20) (R13 10)   11  Fatigue (780 79) (R53 83)   12  Gastroparesis (536 3) (K31 84)   13  Generalized anxiety disorder (300 02) (F41 1)   14  GERD (gastroesophageal reflux disease) (530 81) (K21 9)   15  Headache, migraine (346 90) (G43 909)   16  Hypothyroidism (244 9) (E03 9)   17  IBS (irritable bowel syndrome) (564 1) (K58 9)   18  Insomnia (780 52) (G47 00)   19  Need for immunization against influenza (V04 81) (Z23)   20  Palpitations (785 1) (R00 2)   21  Screening for chlamydial disease (V73 98) (Z11 8)   22  Screening for STD (sexually transmitted disease) (V74 5) (Z11 3)   23  Sleep paralysis (780 58) (G47 8)   24  Visit for gynecologic examination (V72 31) (Z01 419)    Past Medical History   1  History of Acute maxillary sinusitis (461 0) (J01 00)   2  Allergic rhinitis (477 9) (J30 9)   3  Asthma (493 90) (J45 909)   4  History of Felon (681 01) (L03 019)   5  Headache, migraine (346 90) (G43 909)   6  History of impetigo (V13 3) (Z87 2)   7  History of influenza (V12 09) (Z87 09)   8   History of influenza (V12 09) (Z87 09)   9  History of tinea corporis (V12 09) (Z86 19)   10  History of viral gastroenteritis (V12 09) (Z86 19)   11  History of vomiting (V13 89) (Z87 898)   12  History of Infection of nose (478 19) (J34 89)     The active problems and past medical history were reviewed and updated today  Surgical History   1  History of Complete Colonoscopy   2  History of Diagnostic Esophagogastroduodenoscopy   3  History of Tonsillectomy With Adenoidectomy     The surgical history was reviewed and updated today  Family History   Mother    1  Family history of Breast Cancer (V16 3)   2  Family history of Chronic Constipation   3  Denied: Family history of colon cancer   4  Denied: Family history of liver disease   5  Family history of Gastroparesis   6  Family history of Right Hemicolectomy  Father    7  Denied: Family history of colon cancer   8  Denied: Family history of liver disease   9  Family history of Raynaud's Phenomenon     The family history was reviewed and updated today  Social History    · Denied: History of Drug Use   · Living With Parents   · Never a smoker   · Never Drank Alcohol   · Social drinker (V49 89) (Z78 9)  The social history was reviewed and updated today  The social history was reviewed and is unchanged  Current Meds    1  ALPRAZolam 0 25 MG Oral Tablet; TAKE 1 TABLET  AS NEEDED; Therapy: 06Mxf3430 to (Evaluate:95Ytu8470); Last WZ:37CMH9981 Ordered   2  Beyaz 3-0 02-0 451 MG Oral Tablet; TAKE 1 TABLET DAILY AS DIRECTED; Therapy: 02Fuw4818 to (Evaluate:50Wsd5255)  Requested for: 91Toq5298; Last     Rx:27Mup9603 Ordered   3  Beyaz 3-0 02-0 451 MG Oral Tablet; TAKE 1 TABLET DAILY AS DIRECTED; Therapy: 14VHW0787 to (Evaluate:52Gij7706)  Requested for: 16YTN1332; Last     Rx:80Hpm7655 Ordered   4  Dicyclomine HCl - 10 MG Oral Capsule; TAKE 1 CAPSULE 4 TIMES DAILY AS NEEDED;      Therapy: 43WNC7122 to (Evaluate:56Pce6313)  Requested for: 69URO6882; Last Rx: 07KHG6107 Ordered   5  Escitalopram Oxalate 10 MG Oral Tablet; Take 1 tablet daily; Therapy: 40QQF6664 to (Evaluate:51Ndj3178)  Requested for: 92GZM4611; Last     Rx:25Aov6185 Ordered   6  HydrOXYzine HCl - 10 MG Oral Tablet; TAKE 1 TABLET AT BEDTIME AS NEEDED FOR     SLEEP; Therapy: 49FBQ9840 to (Last Rx:24Nov2017)  Requested for: 53BZP0259 Ordered   7  Levothyroxine Sodium 25 MCG Oral Tablet; TAKE 1 TABLET DAILY; Therapy: 98RWR6118 to (Evaluate:62Zzf2529)  Requested for: 43Rht2711; Last     Rx:35Tsr8196 Ordered   8  Linzess 290 MCG Oral Capsule; TAKE ONE CAPSULE BY MOUTH EVERY MORNING; Therapy: 19QCB8305 to (John Murdock)  Requested for: 11DUC7142; Last     QP:32HNU7980 Ordered   9  Pantoprazole Sodium 40 MG Oral Tablet Delayed Release; take one tablet by mouth one     time daily; Therapy: 65PKE0766 to (Nancy Thomas)  Requested for: 28IFA2544; Last     Rx:09Jan2018 Ordered   10  Sucralfate 1 GM Oral Tablet; TAKE 1 TABLET AT BEDTIME; Therapy: 07PUM3154 to (Last Rx:24Nov2017)  Requested for: 24Nov2017 Ordered     The medication list was reviewed and updated today  Allergies   1  Amitriptyline HCl TABS   2  Augmentin TABS   3  MiraLax POWD   4  Omeprazole TBEC    Vitals   Vital Signs    Recorded: 32WOT0892 09:48AM   Temperature 98 2 F, Oral   Heart Rate 84, L Radial   Pulse Quality Regular, L Radial   Systolic 98, LLE, Sitting   Diastolic 60, LLE, Sitting   Height 5 ft    Weight 127 lb    BMI Calculated 24 8   BSA Calculated 1 54     Physical Exam        Constitutional      General appearance: No acute distress, well appearing and well nourished  Pulmonary      Auscultation of lungs: Clear to auscultation  Cardiovascular      Auscultation of heart: Normal rate and rhythm, normal S1 and S2, without murmurs         Examination of extremities for edema and/or varicosities: Normal        Abdomen      Abdomen: Abnormal  -- Mild discomfort upon palpation of the abdomen in all quadrants with no guarding or rebound  Liver and spleen: No hepatomegaly or splenomegaly  Psychiatric      Orientation to person, place, and time: Normal        Mood and affect: Normal           Health Management   Screening for chlamydial disease   (1) CHLAMYDIA/GC AMPLIFIED DNA, PCR; every 1 year; Last 12Aug2016; Next Due: 35Dbk6263; Overdue  Health Maintenance   Pediatric / Adolescent Wellness Visit; every 1 year; Next Due: P2578754;  Overdue    Future Appointments      Date/Time Provider Specialty Site   03/15/2018 07:30 AM Maxim Tabor MD Gastroenterology Adult [facility] OUTPATIENT     Signatures    Electronically signed by : Tomeka Constantino, [facility]; Jan 16 2018 10:13AM EST                       (Author)     Electronically signed by : Marion Castillo MD; Jan 17 2018 11:05AM EST                       (Author)

## 2018-01-23 VITALS
SYSTOLIC BLOOD PRESSURE: 98 MMHG | TEMPERATURE: 98.2 F | HEIGHT: 60 IN | HEART RATE: 84 BPM | WEIGHT: 127 LBS | DIASTOLIC BLOOD PRESSURE: 60 MMHG | BODY MASS INDEX: 24.94 KG/M2

## 2018-01-23 VITALS
TEMPERATURE: 98 F | OXYGEN SATURATION: 98 % | SYSTOLIC BLOOD PRESSURE: 106 MMHG | WEIGHT: 125.38 LBS | DIASTOLIC BLOOD PRESSURE: 62 MMHG | HEIGHT: 60 IN | HEART RATE: 74 BPM | BODY MASS INDEX: 24.61 KG/M2

## 2018-01-25 PROBLEM — R13.19 ESOPHAGEAL DYSPHAGIA: Status: ACTIVE | Noted: 2018-01-25

## 2018-02-09 DIAGNOSIS — K59.00 CONSTIPATION, UNSPECIFIED CONSTIPATION TYPE: Primary | ICD-10-CM

## 2018-02-19 ENCOUNTER — TELEPHONE (OUTPATIENT)
Dept: OBGYN CLINIC | Facility: CLINIC | Age: 23
End: 2018-02-19

## 2018-02-19 DIAGNOSIS — Z30.41 ENCOUNTER FOR BIRTH CONTROL PILLS MAINTENANCE: Primary | ICD-10-CM

## 2018-02-19 DIAGNOSIS — F41.1 GENERALIZED ANXIETY DISORDER: ICD-10-CM

## 2018-02-19 DIAGNOSIS — G47.00 INSOMNIA, UNSPECIFIED TYPE: Primary | ICD-10-CM

## 2018-02-19 PROBLEM — G47.8 SLEEP PARALYSIS: Status: ACTIVE | Noted: 2017-11-24

## 2018-02-19 PROBLEM — R53.83 FATIGUE: Status: ACTIVE | Noted: 2017-05-18

## 2018-02-19 PROBLEM — K59.09 CHRONIC CONSTIPATION: Status: ACTIVE | Noted: 2017-05-18

## 2018-02-19 PROBLEM — R11.0 NAUSEA: Status: ACTIVE | Noted: 2018-01-16

## 2018-02-19 PROBLEM — K21.9 GERD (GASTROESOPHAGEAL REFLUX DISEASE): Status: ACTIVE | Noted: 2017-11-24

## 2018-02-19 PROBLEM — R42 DIZZINESS, NONSPECIFIC: Status: ACTIVE | Noted: 2018-01-16

## 2018-02-19 PROBLEM — R00.2 PALPITATIONS: Status: ACTIVE | Noted: 2017-07-12

## 2018-02-19 PROBLEM — K58.9 IBS (IRRITABLE BOWEL SYNDROME): Status: ACTIVE | Noted: 2017-11-24

## 2018-02-19 RX ORDER — DROSPIRENONE, ETHINYL ESTRADIOL AND LEVOMEFOLATE CALCIUM AND LEVOMEFOLATE CALCIUM 3-0.02(24)
1 KIT ORAL DAILY
Qty: 28 TABLET | Refills: 4 | Status: SHIPPED | OUTPATIENT
Start: 2018-02-19 | End: 2020-07-21 | Stop reason: SDUPTHER

## 2018-02-19 RX ORDER — HYDROXYZINE HYDROCHLORIDE 10 MG/1
10 TABLET, FILM COATED ORAL
Qty: 30 TABLET | Refills: 0 | Status: SHIPPED | OUTPATIENT
Start: 2018-02-19 | End: 2018-09-04

## 2018-02-19 RX ORDER — HYDROXYZINE HYDROCHLORIDE 10 MG/1
1 TABLET, FILM COATED ORAL
COMMUNITY
Start: 2017-11-24 | End: 2018-02-19 | Stop reason: SDUPTHER

## 2018-03-05 ENCOUNTER — DOCUMENTATION (OUTPATIENT)
Dept: GASTROENTEROLOGY | Facility: CLINIC | Age: 23
End: 2018-03-05

## 2018-03-07 ENCOUNTER — ANESTHESIA EVENT (OUTPATIENT)
Dept: PERIOP | Facility: AMBULARY SURGERY CENTER | Age: 23
End: 2018-03-07
Payer: COMMERCIAL

## 2018-03-15 ENCOUNTER — HOSPITAL ENCOUNTER (OUTPATIENT)
Facility: AMBULARY SURGERY CENTER | Age: 23
Setting detail: OUTPATIENT SURGERY
Discharge: HOME | End: 2018-03-15
Attending: INTERNAL MEDICINE | Admitting: INTERNAL MEDICINE
Payer: COMMERCIAL

## 2018-03-15 ENCOUNTER — ANESTHESIA (OUTPATIENT)
Dept: PERIOP | Facility: AMBULARY SURGERY CENTER | Age: 23
End: 2018-03-15
Payer: COMMERCIAL

## 2018-03-15 VITALS
DIASTOLIC BLOOD PRESSURE: 56 MMHG | OXYGEN SATURATION: 100 % | BODY MASS INDEX: 23.95 KG/M2 | SYSTOLIC BLOOD PRESSURE: 92 MMHG | TEMPERATURE: 97.2 F | HEIGHT: 60 IN | RESPIRATION RATE: 20 BRPM | HEART RATE: 70 BPM | WEIGHT: 122 LBS

## 2018-03-15 DIAGNOSIS — R13.19 ESOPHAGEAL DYSPHAGIA: ICD-10-CM

## 2018-03-15 LAB — EXT PREGNANCY TEST URINE: NEGATIVE

## 2018-03-15 PROCEDURE — 43239 EGD BIOPSY SINGLE/MULTIPLE: CPT | Performed by: INTERNAL MEDICINE

## 2018-03-15 PROCEDURE — 88305 TISSUE EXAM BY PATHOLOGIST: CPT | Performed by: PATHOLOGY

## 2018-03-15 PROCEDURE — 88342 IMHCHEM/IMCYTCHM 1ST ANTB: CPT | Performed by: PATHOLOGY

## 2018-03-15 PROCEDURE — 81025 URINE PREGNANCY TEST: CPT | Performed by: INTERNAL MEDICINE

## 2018-03-15 RX ORDER — SODIUM CHLORIDE 9 MG/ML
125 INJECTION, SOLUTION INTRAVENOUS CONTINUOUS
Status: DISCONTINUED | OUTPATIENT
Start: 2018-03-15 | End: 2018-03-15 | Stop reason: HOSPADM

## 2018-03-15 RX ORDER — LEVOTHYROXINE SODIUM 0.03 MG/1
1 TABLET ORAL DAILY
COMMUNITY
Start: 2016-02-10 | End: 2018-06-04 | Stop reason: SDUPTHER

## 2018-03-15 RX ORDER — PROPOFOL 10 MG/ML
INJECTION, EMULSION INTRAVENOUS AS NEEDED
Status: DISCONTINUED | OUTPATIENT
Start: 2018-03-15 | End: 2018-03-15 | Stop reason: SURG

## 2018-03-15 RX ORDER — LEVOTHYROXINE SODIUM 25 MCG
TABLET ORAL
Status: ON HOLD | COMMUNITY
Start: 2017-12-19 | End: 2018-03-15 | Stop reason: DRUGHIGH

## 2018-03-15 RX ORDER — LEVOTHYROXINE SODIUM 175 UG/1
175 TABLET ORAL DAILY
COMMUNITY
End: 2018-09-04

## 2018-03-15 RX ORDER — ESCITALOPRAM OXALATE 10 MG/1
10 TABLET ORAL DAILY
COMMUNITY
End: 2018-06-04 | Stop reason: SDUPTHER

## 2018-03-15 RX ORDER — LIDOCAINE HYDROCHLORIDE 10 MG/ML
INJECTION, SOLUTION INFILTRATION; PERINEURAL AS NEEDED
Status: DISCONTINUED | OUTPATIENT
Start: 2018-03-15 | End: 2018-03-15 | Stop reason: SURG

## 2018-03-15 RX ADMIN — PROPOFOL 30 MG: 10 INJECTION, EMULSION INTRAVENOUS at 08:53

## 2018-03-15 RX ADMIN — PROPOFOL 50 MG: 10 INJECTION, EMULSION INTRAVENOUS at 08:50

## 2018-03-15 RX ADMIN — SODIUM CHLORIDE: 0.9 INJECTION, SOLUTION INTRAVENOUS at 08:39

## 2018-03-15 RX ADMIN — PROPOFOL 150 MG: 10 INJECTION, EMULSION INTRAVENOUS at 08:46

## 2018-03-15 RX ADMIN — LIDOCAINE HYDROCHLORIDE 50 MG: 10 INJECTION, SOLUTION INFILTRATION; PERINEURAL at 08:46

## 2018-03-15 RX ADMIN — SODIUM CHLORIDE 125 ML/HR: 0.9 INJECTION, SOLUTION INTRAVENOUS at 08:12

## 2018-03-15 NOTE — PROGRESS NOTES
[facility]  Progress Note - Carla Reyna 26 y o  female MRN: [id number]    Unit/Bed#: [facility] [place] Encounter: [id number]    Assessment/Plan:  History of intermittent dysphagia and longstanding GERD-plan for EGD evaluation to rule out eosinophilic esophagitis, erosive esophagitis  Subjective:   Denies any nausea/vomiting/fever/chills  Discussed risk and benefit of the procedure  Currently not having symptoms of dysphagia but mostly symptoms of heartburn  Objective:     Vitals: There were no vitals taken for this visit  ,There is no height or weight on file to calculate BMI  No intake or output data in the 24 hours ending 03/15/18 0805    Physical Exam:    GEN: wn/wd, NAD  HEENT: MMM, no cervical or supraclavicular LAD, anciteric  CV: RRR, no m/r/g  CHEST: CTA b/l, no w/r/r  ABD: +BS, soft, NT/ND, no hepatosplenomegaly  EXT: no c/c/e  SKIN: no rashes  NEURO: aaox3      Invasive Devices          No matching active lines, drains, or airways                  Lab, Imaging and other studies:     No visits with results within 1 Day(s) from this visit     Latest known visit with results is:   Appointment on 11/24/2017   Component Date Value    WBC 11/24/2017 6 95     RBC 11/24/2017 4 64     Hemoglobin 11/24/2017 12 8     Hematocrit 11/24/2017 38 5     MCV 11/24/2017 83     MCH 11/24/2017 27 6     MCHC 11/24/2017 33 2     RDW 11/24/2017 13 3     Platelets 10/80/9255 393*    MPV 11/24/2017 9 2     Sodium 11/24/2017 136     Potassium 11/24/2017 4 0     Chloride 11/24/2017 104     CO2 11/24/2017 26     Anion Gap 11/24/2017 6     BUN 11/24/2017 13     Creatinine 11/24/2017 0 78     Glucose 11/24/2017 98     Calcium 11/24/2017 9 2     AST 11/24/2017 11     ALT 11/24/2017 13     Alkaline Phosphatase 11/24/2017 76     Total Protein 11/24/2017 7 6     Albumin 11/24/2017 3 3*    Total Bilirubin 11/24/2017 0 26     eGFR 11/24/2017 109     TSH 3RD GENERATON 11/24/2017 2 080     Free T4 11/24/2017 1 01          I have personally reviewed pertinent reports        Current Facility-Administered Medications   Medication Dose Route Frequency    sodium chloride 0 9 % infusion  125 mL/hr Intravenous Continuous

## 2018-03-15 NOTE — ANESTHESIA PREPROCEDURE EVALUATION
Review of Systems/Medical History  Patient summary reviewed  Chart reviewed  No history of anesthetic complications     Cardiovascular  Negative cardio ROS Exercise tolerance: good,     Pulmonary  Asthma (no meds): seasonal/exercise induced , Recent URI: viral URI> 1 mo ago  , No sleep apnea ,        GI/Hepatic    GERD ,   Comment: Gastroparesis      Negative  ROS        Endo/Other  History of thyroid disease , hypothyroidism,      GYN  Not currently pregnant ,          Hematology  Negative hematology ROS      Musculoskeletal  Negative musculoskeletal ROS        Neurology    Headaches,    Psychology   Anxiety, Depression ,              Physical Exam    Airway    Mallampati score: II  TM Distance: >3 FB  Neck ROM: full     Dental   Comment: prominent incisors, none loose,     Cardiovascular  Comment: Negative ROS,     Pulmonary      Other Findings      Lab Results   Component Value Date    WBC 6 95 11/24/2017    HGB 12 8 11/24/2017     (H) 11/24/2017     Lab Results   Component Value Date     11/24/2017    K 4 0 11/24/2017    BUN 13 11/24/2017    CREATININE 0 78 11/24/2017    GLUCOSE 98 11/24/2017     Anesthesia Plan  ASA Score- 2     Anesthesia Type- IV sedation with anesthesia with ASA Monitors  Additional Monitors:   Airway Plan:         Plan Factors-    Induction- intravenous  Postoperative Plan-     Informed Consent- Anesthetic plan and risks discussed with patient and father  I personally reviewed this patient with the CRNA  Discussed and agreed on the Anesthesia Plan with the CRNA  Kamilah Peralta

## 2018-03-15 NOTE — ANESTHESIA POSTPROCEDURE EVALUATION
Post-Op Assessment Note      CV Status:  Stable    Mental Status:  Alert and awake    Hydration Status:  Euvolemic    PONV Controlled:  Controlled    Airway Patency:  Patent    Post Op Vitals Reviewed: Yes          Staff: CRNA           BP      Temp (!) 97 2 °F (36 2 °C) (03/15/18 0856)    Pulse 66 (03/15/18 0856)   Resp 22 (03/15/18 0856)    SpO2 97 % (03/15/18 0856)

## 2018-06-04 DIAGNOSIS — F41.9 ANXIETY: ICD-10-CM

## 2018-06-04 DIAGNOSIS — E03.9 HYPOTHYROIDISM, UNSPECIFIED TYPE: Primary | ICD-10-CM

## 2018-06-04 RX ORDER — ESCITALOPRAM OXALATE 10 MG/1
10 TABLET ORAL DAILY
Qty: 30 TABLET | Refills: 0 | Status: SHIPPED | OUTPATIENT
Start: 2018-06-04 | End: 2018-08-02 | Stop reason: SDUPTHER

## 2018-06-04 RX ORDER — LEVOTHYROXINE SODIUM 0.03 MG/1
25 TABLET ORAL DAILY
Qty: 30 TABLET | Refills: 0 | Status: SHIPPED | OUTPATIENT
Start: 2018-06-04 | End: 2018-08-02 | Stop reason: SDUPTHER

## 2018-08-02 DIAGNOSIS — E03.9 HYPOTHYROIDISM, UNSPECIFIED TYPE: ICD-10-CM

## 2018-08-02 DIAGNOSIS — F41.9 ANXIETY: ICD-10-CM

## 2018-08-02 RX ORDER — LEVOTHYROXINE SODIUM 0.03 MG/1
25 TABLET ORAL DAILY
Qty: 30 TABLET | Refills: 0 | Status: SHIPPED | OUTPATIENT
Start: 2018-08-02 | End: 2018-09-04 | Stop reason: SDUPTHER

## 2018-08-02 RX ORDER — ESCITALOPRAM OXALATE 10 MG/1
10 TABLET ORAL DAILY
Qty: 30 TABLET | Refills: 0 | Status: SHIPPED | OUTPATIENT
Start: 2018-08-02 | End: 2018-09-04 | Stop reason: SDUPTHER

## 2018-08-28 ENCOUNTER — VBI (OUTPATIENT)
Dept: ADMINISTRATIVE | Facility: OTHER | Age: 23
End: 2018-08-28

## 2018-08-30 NOTE — TELEPHONE ENCOUNTER
Michel Yang    ED Visit Information     Ed visit date: 8/11/18  Diagnosis Description: DEHYDRATION  In Network? No  Discharge status: Home  Discharged with meds ?  NA  Number of ED visits to date: 2  ED Severity:4     Outreach Information    Outreach successful: Yes 2  Date letter mailed:yes  Date Finalized:8/30/18        [facility] Outreach     08/28/2018 04:00 PM Phone (Kate) Crys Barragan (Self) [phone] (H)   Left Message - Att x1 cbc  line [id number]       08/29/2018 11:29 AM Phone (Kate) Crys Barragan (Self) [phone] (H)   Left Message - att x2

## 2018-09-04 ENCOUNTER — OFFICE VISIT (OUTPATIENT)
Dept: FAMILY MEDICINE CLINIC | Facility: CLINIC | Age: 23
End: 2018-09-04
Payer: COMMERCIAL

## 2018-09-04 ENCOUNTER — APPOINTMENT (OUTPATIENT)
Dept: LAB | Facility: CLINIC | Age: 23
End: 2018-09-04
Payer: COMMERCIAL

## 2018-09-04 ENCOUNTER — TRANSCRIBE ORDERS (OUTPATIENT)
Dept: LAB | Facility: CLINIC | Age: 23
End: 2018-09-04

## 2018-09-04 VITALS
HEART RATE: 76 BPM | DIASTOLIC BLOOD PRESSURE: 62 MMHG | WEIGHT: 116 LBS | HEIGHT: 60 IN | SYSTOLIC BLOOD PRESSURE: 92 MMHG | BODY MASS INDEX: 22.78 KG/M2

## 2018-09-04 DIAGNOSIS — J30.1 ALLERGIC RHINITIS DUE TO POLLEN, UNSPECIFIED SEASONALITY: Primary | ICD-10-CM

## 2018-09-04 DIAGNOSIS — F41.9 ANXIETY: ICD-10-CM

## 2018-09-04 DIAGNOSIS — E03.9 HYPOTHYROIDISM, UNSPECIFIED TYPE: ICD-10-CM

## 2018-09-04 LAB
ALBUMIN SERPL BCP-MCNC: 3.3 G/DL (ref 3.5–5)
ALP SERPL-CCNC: 70 U/L (ref 46–116)
ALT SERPL W P-5'-P-CCNC: 13 U/L (ref 12–78)
ANION GAP SERPL CALCULATED.3IONS-SCNC: 7 MMOL/L (ref 4–13)
AST SERPL W P-5'-P-CCNC: 12 U/L (ref 5–45)
BASOPHILS # BLD AUTO: 0.06 THOUSANDS/ΜL (ref 0–0.1)
BASOPHILS NFR BLD AUTO: 1 % (ref 0–1)
BILIRUB SERPL-MCNC: 0.17 MG/DL (ref 0.2–1)
BUN SERPL-MCNC: 12 MG/DL (ref 5–25)
CALCIUM SERPL-MCNC: 9 MG/DL (ref 8.3–10.1)
CHLORIDE SERPL-SCNC: 105 MMOL/L (ref 100–108)
CO2 SERPL-SCNC: 25 MMOL/L (ref 21–32)
CREAT SERPL-MCNC: 0.83 MG/DL (ref 0.6–1.3)
EOSINOPHIL # BLD AUTO: 0.37 THOUSAND/ΜL (ref 0–0.61)
EOSINOPHIL NFR BLD AUTO: 7 % (ref 0–6)
ERYTHROCYTE [DISTWIDTH] IN BLOOD BY AUTOMATED COUNT: 14.4 % (ref 11.6–15.1)
GFR SERPL CREATININE-BSD FRML MDRD: 100 ML/MIN/1.73SQ M
GLUCOSE P FAST SERPL-MCNC: 87 MG/DL (ref 65–99)
HCT VFR BLD AUTO: 39 % (ref 34.8–46.1)
HGB BLD-MCNC: 12.1 G/DL (ref 11.5–15.4)
IMM GRANULOCYTES # BLD AUTO: 0.01 THOUSAND/UL (ref 0–0.2)
IMM GRANULOCYTES NFR BLD AUTO: 0 % (ref 0–2)
LYMPHOCYTES # BLD AUTO: 2.64 THOUSANDS/ΜL (ref 0.6–4.47)
LYMPHOCYTES NFR BLD AUTO: 47 % (ref 14–44)
MCH RBC QN AUTO: 25.9 PG (ref 26.8–34.3)
MCHC RBC AUTO-ENTMCNC: 31 G/DL (ref 31.4–37.4)
MCV RBC AUTO: 84 FL (ref 82–98)
MONOCYTES # BLD AUTO: 0.46 THOUSAND/ΜL (ref 0.17–1.22)
MONOCYTES NFR BLD AUTO: 8 % (ref 4–12)
NEUTROPHILS # BLD AUTO: 2.05 THOUSANDS/ΜL (ref 1.85–7.62)
NEUTS SEG NFR BLD AUTO: 37 % (ref 43–75)
NRBC BLD AUTO-RTO: 0 /100 WBCS
PLATELET # BLD AUTO: 378 THOUSANDS/UL (ref 149–390)
PMV BLD AUTO: 9.8 FL (ref 8.9–12.7)
POTASSIUM SERPL-SCNC: 4 MMOL/L (ref 3.5–5.3)
PROT SERPL-MCNC: 7.3 G/DL (ref 6.4–8.2)
RBC # BLD AUTO: 4.67 MILLION/UL (ref 3.81–5.12)
SODIUM SERPL-SCNC: 137 MMOL/L (ref 136–145)
TSH SERPL DL<=0.05 MIU/L-ACNC: 2.32 UIU/ML (ref 0.36–3.74)
WBC # BLD AUTO: 5.59 THOUSAND/UL (ref 4.31–10.16)

## 2018-09-04 PROCEDURE — 3008F BODY MASS INDEX DOCD: CPT | Performed by: PHYSICIAN ASSISTANT

## 2018-09-04 PROCEDURE — 99214 OFFICE O/P EST MOD 30 MIN: CPT | Performed by: PHYSICIAN ASSISTANT

## 2018-09-04 PROCEDURE — 85025 COMPLETE CBC W/AUTO DIFF WBC: CPT | Performed by: PHYSICIAN ASSISTANT

## 2018-09-04 PROCEDURE — 80053 COMPREHEN METABOLIC PANEL: CPT | Performed by: PHYSICIAN ASSISTANT

## 2018-09-04 PROCEDURE — 84443 ASSAY THYROID STIM HORMONE: CPT | Performed by: PHYSICIAN ASSISTANT

## 2018-09-04 PROCEDURE — 1036F TOBACCO NON-USER: CPT | Performed by: PHYSICIAN ASSISTANT

## 2018-09-04 PROCEDURE — 36415 COLL VENOUS BLD VENIPUNCTURE: CPT | Performed by: PHYSICIAN ASSISTANT

## 2018-09-04 RX ORDER — LEVOTHYROXINE SODIUM 0.03 MG/1
25 TABLET ORAL DAILY
Qty: 90 TABLET | Refills: 3 | Status: SHIPPED | OUTPATIENT
Start: 2018-09-04 | End: 2019-03-06 | Stop reason: SDUPTHER

## 2018-09-04 RX ORDER — ESCITALOPRAM OXALATE 10 MG/1
10 TABLET ORAL DAILY
Qty: 90 TABLET | Refills: 3 | Status: SHIPPED | OUTPATIENT
Start: 2018-09-04 | End: 2019-03-06 | Stop reason: SDUPTHER

## 2018-09-04 RX ORDER — ESCITALOPRAM OXALATE 10 MG/1
10 TABLET ORAL DAILY
Qty: 30 TABLET | Refills: 5 | Status: SHIPPED | OUTPATIENT
Start: 2018-09-04 | End: 2018-09-04 | Stop reason: SDUPTHER

## 2018-09-04 RX ORDER — LEVOTHYROXINE SODIUM 0.03 MG/1
25 TABLET ORAL DAILY
Qty: 30 TABLET | Refills: 5 | Status: SHIPPED | OUTPATIENT
Start: 2018-09-04 | End: 2018-09-04 | Stop reason: SDUPTHER

## 2018-09-04 NOTE — PATIENT INSTRUCTIONS
1   Hypothyroidism-stable  Patient is asymptomatic on levothyroxine 25 mcg daily  We will reassess TSH to ensure stability  2   Anxiety-presently stable on Lexapro 10 mg daily  Continue long-term therapy  3   Allergic rhinitis-stable  May use OTC medications as necessary however currently not required

## 2018-09-04 NOTE — PROGRESS NOTES
Assessment/Plan:  Patient Instructions   1  Hypothyroidism-stable  Patient is asymptomatic on levothyroxine 25 mcg daily  We will reassess TSH to ensure stability  2   Anxiety-presently stable on Lexapro 10 mg daily  Continue long-term therapy  3   Allergic rhinitis-stable  May use OTC medications as necessary however currently not required  Diagnoses and all orders for this visit:    Allergic rhinitis due to pollen, unspecified seasonality  -     TSH, 3rd generation with Free T4 reflex  -     Comprehensive metabolic panel  -     CBC and differential    Anxiety  -     escitalopram (LEXAPRO) 10 mg tablet; Take 1 tablet (10 mg total) by mouth daily  -     TSH, 3rd generation with Free T4 reflex  -     Comprehensive metabolic panel  -     CBC and differential    Hypothyroidism, unspecified type  -     levothyroxine 25 mcg tablet; Take 1 tablet (25 mcg total) by mouth daily  -     TSH, 3rd generation with Free T4 reflex  -     Comprehensive metabolic panel  -     CBC and differential          Subjective: Follow up to chronic conditions and refill meds  mjs     Patient ID: Caro Morocho is a 26 y o  female  HPI:  This is a 21-year-old female who presents to the office for follow-up of chronic health conditions including anxiety and hypothyroidism  She is primarily living in [place] but is back on break currently  She has been feeling well with her thyroid at 25 mcg daily  She has not had any significant problems with diarrhea, constipation, or fatigue  It has been quite awhile since she has had a blood test to assess her thyroid function however  Her anxiety has been stable since on Lexapro 10 mg daily  She continues long-term therapy with this  She denies any side effects of the medication and is happy to continue  She denies any problem with breakthrough anxiety or panic attacks  She is currently working as a     She also has a history of allergic rhinitis but symptoms have been rather minimal   She is not currently requiring any over-the-counter medications  The following portions of the patient's history were reviewed and updated as appropriate: allergies, current medications, past family history, past medical history, past social history, past surgical history and problem list     Review of Systems   Constitutional: Negative for chills, fatigue and fever  HENT: Negative for congestion, ear pain and sinus pressure  Eyes: Negative for visual disturbance  Respiratory: Negative for cough, chest tightness and shortness of breath  Cardiovascular: Negative for chest pain and palpitations  Gastrointestinal: Negative for diarrhea, nausea and vomiting  Endocrine: Negative for polyuria  Genitourinary: Negative for dysuria and frequency  Musculoskeletal: Negative for arthralgias and myalgias  Skin: Negative for pallor and rash  Neurological: Negative for dizziness, weakness, light-headedness, numbness and headaches  Psychiatric/Behavioral: Negative for agitation, behavioral problems and sleep disturbance  All other systems reviewed and are negative  Objective:      BP 92/62   Pulse 76   Ht 5' (1 524 m)   Wt 52 6 kg (116 lb)   BMI 22 65 kg/m²          Physical Exam   Constitutional: She is oriented to person, place, and time  She appears well-developed and well-nourished  No distress  HENT:   Head: Normocephalic and atraumatic  Right Ear: External ear normal    Left Ear: External ear normal    Nose: Nose normal    Mouth/Throat: Oropharynx is clear and moist  No oropharyngeal exudate  Eyes: Conjunctivae and EOM are normal  Pupils are equal, round, and reactive to light  Neck: Normal range of motion  Neck supple  No tracheal deviation present  No thyromegaly present  Cardiovascular: Normal rate, regular rhythm and normal heart sounds  Exam reveals no friction rub  No murmur heard    Pulmonary/Chest: Effort normal and breath sounds normal  No respiratory distress  She has no wheezes  She has no rales  Abdominal: Soft  Bowel sounds are normal  She exhibits no distension  There is no tenderness  There is no rebound and no guarding  Musculoskeletal: Normal range of motion  She exhibits no edema or tenderness  Lymphadenopathy:     She has no cervical adenopathy  Neurological: She is alert and oriented to person, place, and time  No cranial nerve deficit  Coordination normal    Skin: Skin is warm and dry  No rash noted  No erythema  Psychiatric: She has a normal mood and affect  Her behavior is normal  Thought content normal    Nursing note and vitals reviewed

## 2019-03-06 DIAGNOSIS — F41.9 ANXIETY: ICD-10-CM

## 2019-03-06 DIAGNOSIS — E03.9 HYPOTHYROIDISM, UNSPECIFIED TYPE: ICD-10-CM

## 2019-03-06 RX ORDER — ESCITALOPRAM OXALATE 10 MG/1
10 TABLET ORAL DAILY
Qty: 90 TABLET | Refills: 0 | Status: SHIPPED | OUTPATIENT
Start: 2019-03-06 | End: 2019-03-06 | Stop reason: SDUPTHER

## 2019-03-06 RX ORDER — LEVOTHYROXINE SODIUM 0.03 MG/1
25 TABLET ORAL DAILY
Qty: 90 TABLET | Refills: 0 | Status: SHIPPED | OUTPATIENT
Start: 2019-03-06 | End: 2019-03-06 | Stop reason: SDUPTHER

## 2019-03-06 NOTE — TELEPHONE ENCOUNTER
Regarding: Prescription Question  Contact: [phone]  ----- Message from [address] [place] [address] [address], Generic sent at 3/6/2019 12:03 PM EST -----    The request for refills went through but I no longer have [facility]   I have [facility]

## 2019-03-07 ENCOUNTER — TELEPHONE (OUTPATIENT)
Dept: FAMILY MEDICINE CLINIC | Facility: CLINIC | Age: 24
End: 2019-03-07

## 2019-03-07 DIAGNOSIS — F41.9 ANXIETY: ICD-10-CM

## 2019-03-07 DIAGNOSIS — E03.9 HYPOTHYROIDISM, UNSPECIFIED TYPE: ICD-10-CM

## 2019-03-07 RX ORDER — LEVOTHYROXINE SODIUM 0.03 MG/1
25 TABLET ORAL DAILY
Qty: 90 TABLET | Refills: 1 | Status: SHIPPED | OUTPATIENT
Start: 2019-03-07 | End: 2019-03-27 | Stop reason: SDUPTHER

## 2019-03-07 RX ORDER — LEVOTHYROXINE SODIUM 0.03 MG/1
25 TABLET ORAL DAILY
Qty: 90 TABLET | Refills: 0 | Status: SHIPPED | OUTPATIENT
Start: 2019-03-07 | End: 2019-03-07 | Stop reason: SDUPTHER

## 2019-03-07 RX ORDER — ESCITALOPRAM OXALATE 10 MG/1
10 TABLET ORAL DAILY
Qty: 90 TABLET | Refills: 0 | Status: SHIPPED | OUTPATIENT
Start: 2019-03-07 | End: 2019-03-07 | Stop reason: SDUPTHER

## 2019-03-07 RX ORDER — ESCITALOPRAM OXALATE 10 MG/1
10 TABLET ORAL DAILY
Qty: 90 TABLET | Refills: 1 | Status: SHIPPED | OUTPATIENT
Start: 2019-03-07 | End: 2019-03-27 | Stop reason: SDUPTHER

## 2019-03-27 DIAGNOSIS — F41.9 ANXIETY: ICD-10-CM

## 2019-03-27 DIAGNOSIS — E03.9 HYPOTHYROIDISM, UNSPECIFIED TYPE: ICD-10-CM

## 2019-03-27 RX ORDER — ESCITALOPRAM OXALATE 10 MG/1
10 TABLET ORAL DAILY
Qty: 90 TABLET | Refills: 0 | Status: SHIPPED | OUTPATIENT
Start: 2019-03-27 | End: 2019-03-28 | Stop reason: SDUPTHER

## 2019-03-27 RX ORDER — LEVOTHYROXINE SODIUM 0.03 MG/1
25 TABLET ORAL DAILY
Qty: 90 TABLET | Refills: 0 | Status: SHIPPED | OUTPATIENT
Start: 2019-03-27 | End: 2019-03-28 | Stop reason: SDUPTHER

## 2019-03-28 DIAGNOSIS — F41.9 ANXIETY: ICD-10-CM

## 2019-03-28 DIAGNOSIS — E03.9 HYPOTHYROIDISM, UNSPECIFIED TYPE: ICD-10-CM

## 2019-03-28 RX ORDER — ESCITALOPRAM OXALATE 10 MG/1
10 TABLET ORAL DAILY
Qty: 90 TABLET | Refills: 0 | Status: SHIPPED | OUTPATIENT
Start: 2019-03-28 | End: 2019-09-14 | Stop reason: SDUPTHER

## 2019-03-28 RX ORDER — LEVOTHYROXINE SODIUM 0.03 MG/1
25 TABLET ORAL DAILY
Qty: 90 TABLET | Refills: 0 | Status: SHIPPED | OUTPATIENT
Start: 2019-03-28 | End: 2019-09-14 | Stop reason: SDUPTHER

## 2019-03-28 NOTE — TELEPHONE ENCOUNTER
Previous RX's failed to go through   Please approve for Print and I will fax the hard copy to [facility] per pt request

## 2019-06-18 ENCOUNTER — APPOINTMENT (RX ONLY)
Dept: URBAN - METROPOLITAN AREA CLINIC 16 | Facility: CLINIC | Age: 24
Setting detail: DERMATOLOGY
End: 2019-06-18

## 2019-06-18 DIAGNOSIS — D22 MELANOCYTIC NEVI: ICD-10-CM

## 2019-06-18 PROBLEM — D23.121 OTHER BENIGN NEOPLASM OF SKIN OF LEFT UPPER EYELID, INCLUDING CANTHUS: Status: ACTIVE | Noted: 2019-06-18

## 2019-06-18 PROBLEM — F41.9 ANXIETY DISORDER, UNSPECIFIED: Status: ACTIVE | Noted: 2019-06-18

## 2019-06-18 PROBLEM — D22.4 MELANOCYTIC NEVI OF SCALP AND NECK: Status: ACTIVE | Noted: 2019-06-18

## 2019-06-18 PROBLEM — K21.9 GASTRO-ESOPHAGEAL REFLUX DISEASE WITHOUT ESOPHAGITIS: Status: ACTIVE | Noted: 2019-06-18

## 2019-06-18 PROBLEM — E03.9 HYPOTHYROIDISM, UNSPECIFIED: Status: ACTIVE | Noted: 2019-06-18

## 2019-06-18 PROCEDURE — 11305 SHAVE SKIN LESION 0.5 CM/<: CPT

## 2019-06-18 PROCEDURE — ? COUNSELING

## 2019-06-18 PROCEDURE — ? DIAGNOSIS COMMENT

## 2019-06-18 PROCEDURE — ? SHAVE REMOVAL

## 2019-06-18 PROCEDURE — 99202 OFFICE O/P NEW SF 15 MIN: CPT | Mod: 25

## 2019-06-18 ASSESSMENT — LOCATION ZONE DERM: LOCATION ZONE: NECK

## 2019-06-18 ASSESSMENT — LOCATION DETAILED DESCRIPTION DERM: LOCATION DETAILED: MID TRAPEZIAL NECK

## 2019-06-18 ASSESSMENT — LOCATION SIMPLE DESCRIPTION DERM: LOCATION SIMPLE: TRAPEZIAL NECK

## 2019-06-18 NOTE — PROCEDURE: SHAVE REMOVAL
Medical Necessity Clause: This procedure was medically necessary because the lesion that was treated was:
Add Variable For Additional Medical Justification: No
Consent was obtained from the patient. The risks and benefits to therapy were discussed in detail. Specifically, the risks of infection, scarring, bleeding, prolonged wound healing, incomplete removal, allergy to anesthesia, nerve injury and recurrence were addressed. Prior to the procedure, the treatment site was clearly identified and confirmed by the patient. All components of Universal Protocol/PAUSE Rule completed.
Notification Instructions: Patient will be notified of biopsy results. However, patient instructed to call the office if not contacted within 2 weeks.
Size Of Lesion In Cm (Required): 0.4
Billing Type: Third-Party Bill
Hemostasis: Drysol
Medical Necessity Information: It is in your best interest to select a reason for this procedure from the list below. All of these items fulfill various CMS LCD requirements except the new and changing color options.
X Size Of Lesion In Cm (Optional): 0
Path Notes (To The Dermatopathologist): Please check margins.
Anesthesia Volume In Cc: 1
Was A Bandage Applied: Yes
Wound Care: Petrolatum
Anesthesia Type: 1% lidocaine with epinephrine
Detail Level: Detailed
Biopsy Method: Dermablade
Post-Care Instructions: I reviewed with the patient in detail post-care instructions. Patient is to keep the biopsy site dry overnight, and then apply bacitracin twice daily until healed. Patient may apply hydrogen peroxide soaks to remove any crusting.

## 2019-06-18 NOTE — PROCEDURE: DIAGNOSIS COMMENT
Detail Level: Simple
Comment: Favor benign papilloma. Referral sent to ophthalmology to discuss removal given enlargement and pain

## 2019-09-14 DIAGNOSIS — F41.9 ANXIETY: ICD-10-CM

## 2019-09-14 DIAGNOSIS — E03.9 HYPOTHYROIDISM, UNSPECIFIED TYPE: ICD-10-CM

## 2019-09-16 RX ORDER — LEVOTHYROXINE SODIUM 0.03 MG/1
TABLET ORAL
Qty: 90 TABLET | Refills: 4 | Status: SHIPPED | OUTPATIENT
Start: 2019-09-16 | End: 2020-08-19 | Stop reason: SDUPTHER

## 2019-09-16 RX ORDER — ESCITALOPRAM OXALATE 10 MG/1
TABLET ORAL
Qty: 90 TABLET | Refills: 4 | Status: SHIPPED | OUTPATIENT
Start: 2019-09-16 | End: 2020-08-19 | Stop reason: SDUPTHER

## 2020-07-20 NOTE — PROGRESS NOTES
Assessment        Diagnoses and all orders for this visit:    Encounter for gynecological examination (general) (routine) without abnormal findings    Cervical cancer screening  -     Liquid-based pap, diagnostic    Screening examination for STD (sexually transmitted disease)  -     Chlamydia/GC amplified DNA by PCR    Encounter for birth control pills maintenance  -     Drospiren-Eth Estrad-Levomefol (Beyaz) 3-0 02-0 451 MG TABS; Take 1 tablet by mouth daily    LGSIL on Pap smear of cervix  -     Liquid-based pap, diagnostic    Other orders  -     Cancel: Liquid-based pap, screening                  Plan      All questions answered  Await pap smear results  Breast self exam technique reviewed and patient encouraged to perform self-exam monthly  Chlamydia specimen  Contraception: OCP (estrogen/progesterone)  Follow up in 1 year  Follow up as needed  GC specimen  Thin prep Pap smear  Colposcopy if pap abnormal  Patient with family h/o breast CA in mom at age 28, referred to surgical oncology for breast cancer screening recommendations/genetic cancer screening      Subjective      Jacqueline Hceikh is a 26 y o  female who presents for annual exam       Chief Complaint   Patient presents with    Gynecologic Exam     Patient here for yv, states no problems  Last Pap- 2016 Negative      Patient on [address] for OCPS    Last Pap:  2019 LGSIL    Patient is not currently sexually active but did have a new partner this year        Current contraception: OCP (estrogen/progesterone)  History of abnormal Pap smear: yes - LGSIL    Family history of uterine or ovarian cancer: no  Family history of breast cancer: mom 28  Family history of colon cancer: no      Menstrual History:  OB History        0    Para   0    Term   0       0    AB   0    Living   0       SAB   0    TAB   0    Ectopic   0    Multiple   0    Live Births   0                Menarche age: 16  Patient's last menstrual period was 06/29/2020  Period Cycle (Days): 28  Period Duration (Days): 3  Period Pattern: Regular  Menstrual Flow: Light  Menstrual Control: Tampon, Thin pad      Past Medical History:   Diagnosis Date    Anxiety     Asthma     Depression     Disease of thyroid gland     Gastric paresis     Migraines      Past Surgical History:   Procedure Laterality Date    COLON SURGERY      SC ESOPHAGOGASTRODUODENOSCOPY TRANSORAL DIAGNOSTIC N/A 3/15/2018    Procedure: ESOPHAGOGASTRODUODENOSCOPY (EGD); Surgeon: Romeo López MD;  [facility];   Service: Gastroenterology    TONSILLECTOMY       Social History     Socioeconomic History    Marital status: Single     Spouse name: Not on file    Number of children: Not on file    Years of education: Not on file    Highest education level: Not on file   Occupational History    Not on file   Social Needs    Financial resource strain: Not on file    Food insecurity:     Worry: Not on file     Inability: Not on file    Transportation needs:     Medical: Not on file     Non-medical: Not on file   Tobacco Use    Smoking status: Never Smoker    Smokeless tobacco: Never Used   Substance and Sexual Activity    Alcohol use: Yes     Comment: rare     Drug use: No    Sexual activity: Not Currently     Birth control/protection: OCP   Lifestyle    Physical activity:     Days per week: Not on file     Minutes per session: Not on file    Stress: Not on file   Relationships    Social connections:     Talks on phone: Not on file     Gets together: Not on file     Attends Yazdanism service: Not on file     Active member of club or organization: Not on file     Attends meetings of clubs or organizations: Not on file     Relationship status: Not on file    Intimate partner violence:     Fear of current or ex partner: Not on file     Emotionally abused: Not on file     Physically abused: Not on file     Forced sexual activity: Not on file   Other Topics Concern    Not on file Social History Narrative    Not on file         Current Outpatient Medications:     Drospiren-Eth Estrad-Levomefol (BEYAZ) 3-0 02-0 451 MG TABS, Take 1 tablet by mouth daily, Disp: 28 tablet, Rfl: 4    escitalopram (LEXAPRO) 10 mg tablet, TAKE 1 TABLET DAILY, Disp: 90 tablet, Rfl: 4    levothyroxine 25 mcg tablet, TAKE 1 TABLET DAILY, Disp: 90 tablet, Rfl: 4    Allergies   Allergen Reactions    Compazine [Prochlorperazine]            Review of Systems   Constitutional: Negative  HENT: Negative  Eyes: Negative  Respiratory: Negative  Cardiovascular: Negative  Gastrointestinal: Negative  Endocrine: Negative  Genitourinary:        As noted in HPI   Musculoskeletal: Negative  Skin: Negative  Allergic/Immunologic: Negative  Neurological: Negative  Hematological: Negative  Psychiatric/Behavioral: Negative  /62 (BP Location: Right arm, Patient Position: Sitting, Cuff Size: Standard)   Pulse 79   Temp 98 9 °F (37 2 °C)   Ht 5' (1 524 m)   Wt 53 4 kg (117 lb 12 8 oz)   LMP 06/29/2020   BMI 23 01 kg/m²         Physical Exam   Constitutional: She is oriented to person, place, and time  She appears well-developed and well-nourished  Genitourinary: Rectum normal, vagina normal and uterus normal  Pelvic exam was performed with patient supine  There is no rash or lesion on the right labia  There is no rash or lesion on the left labia  Vagina exhibits rugosity  Vagina exhibits no lesion  No bleeding in the vagina  No vaginal discharge found  Right adnexum does not display mass, does not display tenderness and does not display fullness  Left adnexum does not display mass, does not display tenderness and does not display fullness  Cervix does not exhibit motion tenderness, lesion or polyp  Uterus is not enlarged or tender  Rectal exam shows no external hemorrhoid     Genitourinary Comments: Perineum and anus: Normal    Pelvic support  - Vaginal outlet: normal  - Anterior vaginal wall: normal  - Posterior vaginal wall: normal    Bladder: normal    Urethra: normal    Urethral support: normal       HENT:   Head: Normocephalic  Neck: No thyromegaly present  Cardiovascular: Normal rate, regular rhythm and normal heart sounds  No murmur heard  Pulmonary/Chest: Effort normal and breath sounds normal  No respiratory distress  She has no wheezes  She has no rales  Right breast exhibits no mass, no nipple discharge, no skin change and no tenderness  Left breast exhibits no mass, no nipple discharge, no skin change and no tenderness  Abdominal: Soft  She exhibits no distension and no mass  There is no tenderness  There is no rebound and no guarding  Musculoskeletal: She exhibits no edema or tenderness  Lymphadenopathy:        Right: No inguinal adenopathy present  Left: No inguinal adenopathy present  Neurological: She is alert and oriented to person, place, and time  Skin: Skin is warm and dry  Psychiatric: She has a normal mood and affect

## 2020-07-20 NOTE — PATIENT INSTRUCTIONS
Wellness Visit for Adults   AMBULATORY CARE:   A wellness visit  is when you see your healthcare provider to get screened for health problems  You can also get advice on how to stay healthy  Write down your questions so you remember to ask them  Ask your healthcare provider how often you should have a wellness visit  What happens at a wellness visit:  Your healthcare provider will ask about your health, and your family history of health problems  This includes high blood pressure, heart disease, and cancer  He or she will ask if you have symptoms that concern you, if you smoke, and about your mood  You may also be asked about your intake of medicines, supplements, food, and alcohol  Any of the following may be done:  · Your weight  will be checked  Your height may also be checked so your body mass index (BMI) can be calculated  Your BMI shows if you are at a healthy weight  · Your blood pressure  and heart rate will be checked  Your temperature may also be checked  · Blood and urine tests  may be done  Blood tests may be done to check your cholesterol levels  Abnormal cholesterol levels increase your risk for heart disease and stroke  You may also need a blood or urine test to check for diabetes if you are at increased risk  Urine tests may be done to look for signs of an infection or kidney disease  · A physical exam  includes checking your heartbeat and lungs with a stethoscope  Your healthcare provider may also check your skin to look for sun damage  · Screening tests  may be recommended  A screening test is done to check for diseases that may not cause symptoms  The screening tests you may need depend on your age, gender, family history, and lifestyle habits  For example, colorectal screening may be recommended if you are 48years old or older  Screening tests you need if you are a woman:   · A Pap smear  is used to screen for cervical cancer   Pap smears are usually done every 3 to 5 years depending on your age  You may need them more often if you have had abnormal Pap smear test results in the past  Ask your healthcare provider how often you should have a Pap smear  · A mammogram  is an x-ray of your breasts to screen for breast cancer  Experts recommend mammograms every 2 years starting at age 48 years  You may need a mammogram at age 52 years or younger if you have an increased risk for breast cancer  Talk to your healthcare provider about when you should start having mammograms and how often you need them  Vaccines you may need:   · Get an influenza vaccine  every year  The influenza vaccine protects you from the flu  Several types of viruses cause the flu  The viruses change over time, so new vaccines are made each year  · Get a tetanus-diphtheria (Td) booster vaccine  every 10 years  This vaccine protects you against tetanus and diphtheria  Tetanus is a severe infection that may cause painful muscle spasms and lockjaw  Diphtheria is a severe bacterial infection that causes a thick covering in the back of your mouth and throat  · Get a human papillomavirus (HPV) vaccine  if you are female and aged 23 to 32 or male 23 to 24 and never received it  This vaccine protects you from HPV infection  HPV is the most common infection spread by sexual contact  HPV may also cause vaginal, penile, and anal cancers  · Get a pneumococcal vaccine  if you are aged 72 years or older  The pneumococcal vaccine is an injection given to protect you from pneumococcal disease  Pneumococcal disease is an infection caused by pneumococcal bacteria  The infection may cause pneumonia, meningitis, or an ear infection  · Get a shingles vaccine  if you are aged 61 or older, even if you have had shingles before  The shingles vaccine is an injection to protect you from the varicella-zoster virus  This is the same virus that causes chickenpox   Shingles is a painful rash that develops in people who had chickenpox or have been exposed to the virus  How to eat healthy:  My Plate is a model for planning healthy meals  It shows the types and amounts of foods that should go on your plate  Fruits and vegetables make up about half of your plate, and grains and protein make up the other half  A serving of dairy is included on the side of your plate  The amount of calories and serving sizes you need depends on your age, gender, weight, and height  Examples of healthy foods are listed below:  · Eat a variety of vegetables  such as dark green, red, and orange vegetables  You can also include canned vegetables low in sodium (salt) and frozen vegetables without added butter or sauces  · Eat a variety of fresh fruits , canned fruit in 100% juice, frozen fruit, and dried fruit  · Include whole grains  At least half of the grains you eat should be whole grains  Examples include whole-wheat bread, wheat pasta, brown rice, and whole-grain cereals such as oatmeal     · Eat a variety of protein foods such as seafood (fish and shellfish), lean meat, and poultry without skin (turkey and chicken)  Examples of lean meats include pork leg, shoulder, or tenderloin, and beef round, sirloin, tenderloin, and extra lean ground beef  Other protein foods include eggs and egg substitutes, beans, peas, soy products, nuts, and seeds  · Choose low-fat dairy products such as skim or 1% milk or low-fat yogurt, cheese, and cottage cheese  · Limit unhealthy fats  such as butter, hard margarine, and shortening  Exercise:  Exercise at least 30 minutes per day on most days of the week  Some examples of exercise include walking, biking, dancing, and swimming  You can also fit in more physical activity by taking the stairs instead of the elevator or parking farther away from stores  Include muscle strengthening activities 2 days each week  Regular exercise provides many health benefits   It helps you manage your weight, and decreases your risk for type 2 diabetes, heart disease, stroke, and high blood pressure  Exercise can also help improve your mood  Ask your healthcare provider about the best exercise plan for you  General health and safety guidelines:   · Do not smoke  Nicotine and other chemicals in cigarettes and cigars can cause lung damage  Ask your healthcare provider for information if you currently smoke and need help to quit  E-cigarettes or smokeless tobacco still contain nicotine  Talk to your healthcare provider before you use these products  · Limit alcohol  A drink of alcohol is 12 ounces of beer, 5 ounces of wine, or 1½ ounces of liquor  · Lose weight, if needed  Being overweight increases your risk of certain health conditions  These include heart disease, high blood pressure, type 2 diabetes, and certain types of cancer  · Protect your skin  Do not sunbathe or use tanning beds  Use sunscreen with a SPF 15 or higher  Apply sunscreen at least 15 minutes before you go outside  Reapply sunscreen every 2 hours  Wear protective clothing, hats, and sunglasses when you are outside  · Drive safely  Always wear your seatbelt  Make sure everyone in your car wears a seatbelt  A seatbelt can save your life if you are in an accident  Do not use your cell phone when you are driving  This could distract you and cause an accident  Pull over if you need to make a call or send a text message  · Practice safe sex  Use latex condoms if are sexually active and have more than one partner  Your healthcare provider may recommend screening tests for sexually transmitted infections (STIs)  · Wear helmets, lifejackets, and protective gear  Always wear a helmet when you ride a bike or motorcycle, go skiing, or play sports that could cause a head injury  Wear protective equipment when you play sports  Wear a lifejacket when you are on a boat or doing water sports    © 2017 [employer] Information is for End User's use only and may not be sold, redistributed or otherwise used for commercial purposes  All illustrations and images included in CareNotes® are the copyrighted property of [employer]  or Ajit Sen  The above information is an  only  It is not intended as medical advice for individual conditions or treatments  Talk to your doctor, nurse or pharmacist before following any medical regimen to see if it is safe and effective for you  Breast Self Exam for Women   WHAT YOU NEED TO KNOW:   A BSE is a way to check your breasts for lumps and other changes  Regular BSEs can help you know how your breasts normally look and feel  Most breast lumps or changes are not cancer, but you should always have them checked by a healthcare provider  Your healthcare provider can also watch you do a BSE and can tell you if you are doing your BSE correctly  DISCHARGE INSTRUCTIONS:   Contact your healthcare provider if:   · You find any lumps or changes in your breasts  · You have breast pain or fluid coming from your nipples  · You have questions or concerns about your condition or care  Why you should do a BSE:  Breast cancer is the most common type of cancer in women  Even if you have mammograms, you may still want to do a BSE regularly  If you know how your breasts normally feel and look, it may help you know when to contact your healthcare provider  Mammograms can miss some cancers  You may find a lump during a BSE that did not show up on your mammogram   When you should do a BSE:  Opal Borne your calendar to help you remember to do BSE on a regular schedule  One easy way to remember to do a BSE is to do the exam on the same day of each month  If you have periods, you may want to do your BSE 1 week after your period ends  This is the time when your breasts may be the least swollen, lumpy, or tender  You can do regular BSEs even if you are breastfeeding or have breast implants     How to do a BSE:   · Look at your breasts in a mirror  Look at the size and shape of each breast and nipple  Check for swelling, lumps, dimpling, scaly skin, or other skin changes  Look for nipple changes, such as a nipple that is painful or beginning to pull inward  Gently squeeze both nipples and check to see if fluid (that is not breast milk) comes out of them  If you find any of these or other breast changes, contact your healthcare provider  Check your breasts while you sit or  the following 3 positions:    [facility] your arms down at your sides  ¨ Raise your hands and join them behind your head  ¨ Put firm pressure with your hands on your hips  Bend slightly forward while you look at your breasts in the mirror  · Lie down and feel your breasts  When you lie down, your breast tissue spreads out evenly over your chest  This makes it easier for you to feel for lumps and anything that may not be normal for your breasts  Do a BSE on one breast at a time  ¨ Place a small pillow or towel under your left shoulder  Put your left arm behind your head  ¨ Use the 3 middle fingers of your right hand  Use your fingertip pads, on the top of your fingers  Your fingertip pad is the most sensitive part of your finger  ¨ Use small circles to feel your breast tissue  Use your fingertip pads to make dime-sized, overlapping circles on your breast and armpits  Use light, medium, and firm pressure  First, press lightly  Second, press with medium pressure to feel a little deeper into the breast  Last, use firm pressure to feel deep within your breast     ¨ Examine your entire breast area  Examine the breast area from above the breast to below the breast where you feel only ribs  Make small circles with your fingertips, starting in the middle of your armpit  Make circles going up and down the breast area  Continue toward your breast and all the way across it   Examine the area from your armpit all the way over to the middle of your chest (breastbone)  Stop at the middle of your chest     ¨ Move the pillow or towel to your right shoulder, and put your right arm behind your head  Use the 3 fingertip pads of your left hand, and repeat the above steps to do a BSE on your right breast        What else you can do to check for breast problems or cancer:  Some experts suggest that women 36years of age or older should have a mammogram every year  Other experts suggest that women between the ages of 48and 76years old should have a mammogram every 2 years  Talk to your healthcare provider about when you should have a mammogram   Follow up with your healthcare provider as directed: Your healthcare provider can watch you and tell you if you are doing your BSE correctly  Write down your questions so you remember to ask them during your visits  © 2017 [employer] Information is for End User's use only and may not be sold, redistributed or otherwise used for commercial purposes  All illustrations and images included in CareNotes® are the copyrighted property of [employer]  or Ajit Sen  The above information is an  only  It is not intended as medical advice for individual conditions or treatments  Talk to your doctor, nurse or pharmacist before following any medical regimen to see if it is safe and effective for you

## 2020-07-21 ENCOUNTER — TELEPHONE (OUTPATIENT)
Dept: SURGICAL ONCOLOGY | Facility: CLINIC | Age: 25
End: 2020-07-21

## 2020-07-21 ENCOUNTER — ANNUAL EXAM (OUTPATIENT)
Dept: OBGYN CLINIC | Facility: CLINIC | Age: 25
End: 2020-07-21
Payer: COMMERCIAL

## 2020-07-21 VITALS
HEIGHT: 60 IN | TEMPERATURE: 98.9 F | WEIGHT: 117.8 LBS | HEART RATE: 79 BPM | SYSTOLIC BLOOD PRESSURE: 104 MMHG | DIASTOLIC BLOOD PRESSURE: 62 MMHG | BODY MASS INDEX: 23.13 KG/M2

## 2020-07-21 DIAGNOSIS — R87.612 LGSIL ON PAP SMEAR OF CERVIX: ICD-10-CM

## 2020-07-21 DIAGNOSIS — Z12.4 CERVICAL CANCER SCREENING: ICD-10-CM

## 2020-07-21 DIAGNOSIS — Z80.3 FAMILY HISTORY OF BREAST CANCER IN MOTHER: ICD-10-CM

## 2020-07-21 DIAGNOSIS — Z30.41 ENCOUNTER FOR BIRTH CONTROL PILLS MAINTENANCE: ICD-10-CM

## 2020-07-21 DIAGNOSIS — Z11.3 SCREENING EXAMINATION FOR STD (SEXUALLY TRANSMITTED DISEASE): ICD-10-CM

## 2020-07-21 DIAGNOSIS — Z01.419 ENCOUNTER FOR GYNECOLOGICAL EXAMINATION (GENERAL) (ROUTINE) WITHOUT ABNORMAL FINDINGS: Primary | ICD-10-CM

## 2020-07-21 PROCEDURE — 3008F BODY MASS INDEX DOCD: CPT | Performed by: PHYSICIAN ASSISTANT

## 2020-07-21 PROCEDURE — 3008F BODY MASS INDEX DOCD: CPT | Performed by: OBSTETRICS & GYNECOLOGY

## 2020-07-21 PROCEDURE — 87591 N.GONORRHOEAE DNA AMP PROB: CPT | Performed by: OBSTETRICS & GYNECOLOGY

## 2020-07-21 PROCEDURE — 87624 HPV HI-RISK TYP POOLED RSLT: CPT | Performed by: OBSTETRICS & GYNECOLOGY

## 2020-07-21 PROCEDURE — 87491 CHLMYD TRACH DNA AMP PROBE: CPT | Performed by: OBSTETRICS & GYNECOLOGY

## 2020-07-21 PROCEDURE — S0612 ANNUAL GYNECOLOGICAL EXAMINA: HCPCS | Performed by: OBSTETRICS & GYNECOLOGY

## 2020-07-21 PROCEDURE — 88175 CYTOPATH C/V AUTO FLUID REDO: CPT | Performed by: OBSTETRICS & GYNECOLOGY

## 2020-07-21 RX ORDER — DROSPIRENONE, ETHINYL ESTRADIOL AND LEVOMEFOLATE CALCIUM AND LEVOMEFOLATE CALCIUM 3-0.02(24)
1 KIT ORAL DAILY
Qty: 84 TABLET | Refills: 3 | Status: SHIPPED | OUTPATIENT
Start: 2020-07-21 | End: 2021-07-02 | Stop reason: SDUPTHER

## 2020-07-21 NOTE — TELEPHONE ENCOUNTER
Genetics New Patient Intake Form   Patient Details:     Blessing De Santiago    1995    [id number]    Background Information:    "On Hold (Urgent Slot)" is set aside for Pancreatic, Ovarian, and Scheduled Surgery Patients  If it is not scheduled by the previous Friday, any patient can be scheduled in it  Who is calling to schedule? If not self, relationship to patient? self   Referring Provider OBGYN   Is this a personal or family history?  family   If personal history, what age were you at diagnosis? no   What is the type of tumor? breast    Pancreatic and Ovarian needs to be scheduled in the "On Hold (Urgent Slot)"   Do you have a pending surgery for your cancer diagnosis? No    If yes: needs to be scheduled in the "On Hold (Urgent Slot)"   Referring Provider Department obgyn   Did the patient schedule an appointment? Yes   List Appointment Date and Provider Name  Hialeah Macy 12/16   Scheduling Information:   [facility] [place]   Are there any dates/time the patient cannot be seen?  na   Miscellaneous: na   After completing the above information, please route to Oncology Genetics

## 2020-07-23 ENCOUNTER — TELEPHONE (OUTPATIENT)
Dept: OBGYN CLINIC | Facility: CLINIC | Age: 25
End: 2020-07-23

## 2020-07-23 NOTE — TELEPHONE ENCOUNTER
Patient called, reports she is still bleeding from pap smear done on Tuesday and is concerned  Please advise

## 2020-07-23 NOTE — TELEPHONE ENCOUNTER
Patient with mild spotting since PAP   Advised this should resolve on its own spontaneously and if with persistent bleeding to call tomorrow to be seen

## 2020-07-25 LAB
C TRACH DNA SPEC QL NAA+PROBE: NEGATIVE
HPV HR 12 DNA CVX QL NAA+PROBE: POSITIVE
HPV16 DNA CVX QL NAA+PROBE: NEGATIVE
HPV18 DNA CVX QL NAA+PROBE: NEGATIVE
N GONORRHOEA DNA SPEC QL NAA+PROBE: NEGATIVE

## 2020-07-29 LAB
LAB AP GYN PRIMARY INTERPRETATION: NORMAL
LAB AP LMP: NORMAL
Lab: NORMAL

## 2020-08-12 PROBLEM — B97.7 HIGH RISK HPV INFECTION: Status: ACTIVE | Noted: 2020-08-12

## 2020-08-16 PROBLEM — Z01.818 PREOPERATIVE TESTING: Status: ACTIVE | Noted: 2020-08-16

## 2020-08-18 ENCOUNTER — TELEPHONE (OUTPATIENT)
Dept: FAMILY MEDICINE CLINIC | Facility: CLINIC | Age: 25
End: 2020-08-18

## 2020-08-18 DIAGNOSIS — E03.9 HYPOTHYROIDISM, UNSPECIFIED TYPE: ICD-10-CM

## 2020-08-18 DIAGNOSIS — F41.9 ANXIETY: ICD-10-CM

## 2020-08-18 NOTE — TELEPHONE ENCOUNTER
Pt called in to request a medication refill on her levothyroxine and lexapro stated she only has 3 left of each  she made an appointment for 8/24 at 3:30pm and she would like to know if something can be sent over to pharmacy before she runs out?

## 2020-08-19 ENCOUNTER — TELEPHONE (OUTPATIENT)
Dept: HEMATOLOGY ONCOLOGY | Facility: CLINIC | Age: 25
End: 2020-08-19

## 2020-08-19 RX ORDER — ESCITALOPRAM OXALATE 10 MG/1
10 TABLET ORAL DAILY
Qty: 30 TABLET | Refills: 0 | Status: SHIPPED | OUTPATIENT
Start: 2020-08-19 | End: 2020-08-24 | Stop reason: SDUPTHER

## 2020-08-19 RX ORDER — LEVOTHYROXINE SODIUM 0.03 MG/1
25 TABLET ORAL DAILY
Qty: 30 TABLET | Refills: 0 | Status: SHIPPED | OUTPATIENT
Start: 2020-08-19 | End: 2020-08-24 | Stop reason: SDUPTHER

## 2020-08-19 NOTE — TELEPHONE ENCOUNTER
I called patient and left a message regarding her upcoming appointment with Aureliano Louise on Dec 16 at 1:00 pm, we have a new genetic counselor Malcom Ernandez that has joined our program and earlier slots are available if patient is interested in moving up her appointment to a sooner date   I advised patient to return call at [phone]

## 2020-08-24 ENCOUNTER — TELEPHONE (OUTPATIENT)
Dept: OBGYN CLINIC | Facility: CLINIC | Age: 25
End: 2020-08-24

## 2020-08-24 ENCOUNTER — TELEMEDICINE (OUTPATIENT)
Dept: FAMILY MEDICINE CLINIC | Facility: CLINIC | Age: 25
End: 2020-08-24
Payer: COMMERCIAL

## 2020-08-24 DIAGNOSIS — E03.9 HYPOTHYROIDISM, UNSPECIFIED TYPE: Primary | ICD-10-CM

## 2020-08-24 DIAGNOSIS — F41.1 GENERALIZED ANXIETY DISORDER: ICD-10-CM

## 2020-08-24 DIAGNOSIS — G47.00 INSOMNIA, UNSPECIFIED TYPE: ICD-10-CM

## 2020-08-24 DIAGNOSIS — F41.9 ANXIETY: ICD-10-CM

## 2020-08-24 PROCEDURE — 99214 OFFICE O/P EST MOD 30 MIN: CPT | Performed by: PHYSICIAN ASSISTANT

## 2020-08-24 PROCEDURE — 1036F TOBACCO NON-USER: CPT | Performed by: PHYSICIAN ASSISTANT

## 2020-08-24 PROCEDURE — 3725F SCREEN DEPRESSION PERFORMED: CPT | Performed by: PHYSICIAN ASSISTANT

## 2020-08-24 RX ORDER — LEVOTHYROXINE SODIUM 0.03 MG/1
25 TABLET ORAL DAILY
Qty: 90 TABLET | Refills: 3 | Status: SHIPPED | OUTPATIENT
Start: 2020-08-24 | End: 2021-08-10 | Stop reason: SDUPTHER

## 2020-08-24 RX ORDER — ESCITALOPRAM OXALATE 10 MG/1
10 TABLET ORAL DAILY
Qty: 90 TABLET | Refills: 3 | Status: SHIPPED | OUTPATIENT
Start: 2020-08-24 | End: 2021-08-10 | Stop reason: SDUPTHER

## 2020-08-24 NOTE — TELEPHONE ENCOUNTER
Pt missed colposcopy appt, pls call to reschedule, If you are unable to reach her, let mw know, she will need a letter mailed to her

## 2020-08-24 NOTE — PATIENT INSTRUCTIONS
Assessment/plan:  1  Anxiety-presently stable with Lexapro 10 mg daily, no medication changes  2  Hypothyroidism-stable with levothyroxine 25 mcg daily  Will reassess TSH in January  Labs were ordered  3  Insomnia-patient with mild to moderate symptoms  She seems to get adequate relief with melatonin  No medication changes

## 2020-08-24 NOTE — PROGRESS NOTES
Virtual Regular Visit      Assessment/Plan:  Patient Instructions   Assessment/plan:  1  Anxiety-presently stable with Lexapro 10 mg daily, no medication changes  2  Hypothyroidism-stable with levothyroxine 25 mcg daily  Will reassess TSH in January  Labs were ordered  3  Insomnia-patient with mild to moderate symptoms  She seems to get adequate relief with melatonin  No medication changes          Problem List Items Addressed This Visit        Endocrine    Hypothyroidism - Primary    Relevant Medications    levothyroxine 25 mcg tablet    Other Relevant Orders    CBC and differential    Comprehensive metabolic panel    Lipid Panel with Direct LDL reflex    TSH, 3rd generation with Free T4 reflex       Other    Generalized anxiety disorder    Relevant Medications    escitalopram (LEXAPRO) 10 mg tablet    Other Relevant Orders    CBC and differential    Comprehensive metabolic panel    Lipid Panel with Direct LDL reflex    TSH, 3rd generation with Free T4 reflex    Insomnia      Other Visit Diagnoses     Anxiety        Relevant Medications    escitalopram (LEXAPRO) 10 mg tablet    Other Relevant Orders    CBC and differential    Comprehensive metabolic panel    Lipid Panel with Direct LDL reflex    TSH, 3rd generation with Free T4 reflex               Reason for visit is   Chief Complaint   Patient presents with    Reestablish Care    Medication Refill    Virtual Regular Visit        Encounter provider Fredi Flores PA-C    Provider located at [address] [place] [id number]      Recent Visits  Date Type Provider Dept   08/18/20 Telephone [facility]   Showing recent visits within past 7 days and meeting all other requirements     Today's Visits  Date Type Provider Dept   08/24/20 Telemedicine Fredi Flores PA-C [facility]   Showing today's visits and meeting all other requirements     Future Appointments  No visits were found meeting these conditions  Showing future appointments within next 150 days and meeting all other requirements        The patient was identified by name and date of birth  Baljeet Pino was informed that this is a telemedicine visit and that the visit is being conducted through [employer] and patient was informed that this is not a secure, HIPAA-complaint platform  She agrees to proceed     My office door was closed  No one else was in the room  She acknowledged consent and understanding of privacy and security of the video platform  The patient has agreed to participate and understands they can discontinue the visit at any time  Patient is aware this is a billable service  Subjective  Baljeet Pino is a 26 y o  female see HPI        HPI:  This is a 81-year-old female who presents via virtual video visit using [employer] platform  She is seen for follow-up to hypothyroidism and anxiety disorder  She has been taking levothyroxine and Lexapro on a regular basis  Patient 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 but feels that there is a certain amount of normal stress with this  Aside from this she has felt well with the Lexapro 10 mg and feels that it continues to work as well as it should  She has not had any side effects or problems with it  She also continues on levothyroxine at 25 mcg daily  She had TSH last assessed in January  She has not had any trouble feeling sluggish her fatigued or having excessive weight changes  She does have difficulty sleeping some nights but still gets 5-6 hours of sleep regularly  She has been using some melatonin over-the-counter and usually feels this is enough to help         Past Medical History:   Diagnosis Date    Anxiety     Asthma     Depression     Disease of thyroid gland     Gastric paresis     Migraines        Past Surgical History:   Procedure Laterality Date    COLON SURGERY      TN ESOPHAGOGASTRODUODENOSCOPY TRANSORAL DIAGNOSTIC N/A 3/15/2018    Procedure: ESOPHAGOGASTRODUODENOSCOPY (EGD); Surgeon: Yeison Paige MD;  [facility]; Service: Gastroenterology    TONSILLECTOMY         Current Outpatient Medications   Medication Sig Dispense Refill    Drospiren-Eth Estrad-Levomefol (Beyaz) 3-0 02-0 451 MG TABS Take 1 tablet by mouth daily 84 tablet 3    escitalopram (LEXAPRO) 10 mg tablet Take 1 tablet (10 mg total) by mouth daily 90 tablet 3    levothyroxine 25 mcg tablet Take 1 tablet (25 mcg total) by mouth daily 90 tablet 3     No current facility-administered medications for this visit  Allergies   Allergen Reactions    Compazine [Prochlorperazine]        Review of Systems   Constitutional: Negative for chills, fatigue and fever  HENT: Negative for congestion, ear pain and sinus pressure  Eyes: Negative for visual disturbance  Respiratory: Negative for cough, chest tightness and shortness of breath  Cardiovascular: Negative for chest pain and palpitations  Gastrointestinal: Negative for diarrhea, nausea and vomiting  Endocrine: Negative for polyuria  Genitourinary: Negative for dysuria and frequency  Musculoskeletal: Negative for arthralgias and myalgias  Skin: Negative for pallor and rash  Neurological: Negative for dizziness, weakness, light-headedness, numbness and headaches  Psychiatric/Behavioral: Negative for agitation, behavioral problems and sleep disturbance  All other systems reviewed and are negative  Video Exam    There were no vitals filed for this visit  Physical Exam  Constitutional:       General: She is not in acute distress  Appearance: She is well-developed  HENT:      Head: Normocephalic and atraumatic  Pulmonary:      Effort: Pulmonary effort is normal    Musculoskeletal: Normal range of motion  Skin:     General: Skin is warm  Findings: No erythema     Neurological:      Mental Status: She is alert and oriented to person, place, and time  I spent 15 minutes directly with the patient during this visit      Jamil Moore acknowledges that she has consented to an online visit or consultation  She understands that the online visit is based solely on information provided by her, and that, in the absence of a face-to-face physical evaluation by the physician, the diagnosis she receives is both limited and provisional in terms of accuracy and completeness  This is not intended to replace a full medical face-to-face evaluation by the physician  Suellen Crawford understands and accepts these terms

## 2020-09-16 ENCOUNTER — HOSPITAL ENCOUNTER (EMERGENCY)
Facility: HOSPITAL | Age: 25
Discharge: HOME | End: 2020-09-16
Attending: EMERGENCY MEDICINE | Admitting: EMERGENCY MEDICINE
Payer: COMMERCIAL

## 2020-09-16 ENCOUNTER — OFFICE VISIT (OUTPATIENT)
Dept: FAMILY MEDICINE CLINIC | Facility: CLINIC | Age: 25
End: 2020-09-16
Payer: COMMERCIAL

## 2020-09-16 VITALS
RESPIRATION RATE: 16 BRPM | TEMPERATURE: 98.1 F | WEIGHT: 118.61 LBS | OXYGEN SATURATION: 99 % | SYSTOLIC BLOOD PRESSURE: 106 MMHG | HEART RATE: 90 BPM | BODY MASS INDEX: 23.16 KG/M2 | DIASTOLIC BLOOD PRESSURE: 75 MMHG

## 2020-09-16 VITALS
DIASTOLIC BLOOD PRESSURE: 64 MMHG | BODY MASS INDEX: 23.41 KG/M2 | TEMPERATURE: 98 F | WEIGHT: 119.25 LBS | SYSTOLIC BLOOD PRESSURE: 100 MMHG | HEIGHT: 60 IN

## 2020-09-16 DIAGNOSIS — K21.00 GASTROESOPHAGEAL REFLUX DISEASE WITH ESOPHAGITIS: ICD-10-CM

## 2020-09-16 DIAGNOSIS — Z23 ENCOUNTER FOR IMMUNIZATION: ICD-10-CM

## 2020-09-16 DIAGNOSIS — K58.2 IRRITABLE BOWEL SYNDROME WITH BOTH CONSTIPATION AND DIARRHEA: ICD-10-CM

## 2020-09-16 DIAGNOSIS — R10.9 ABDOMINAL PAIN: Primary | ICD-10-CM

## 2020-09-16 DIAGNOSIS — K62.5 RECTAL BLEED: ICD-10-CM

## 2020-09-16 DIAGNOSIS — K62.5 BRBPR (BRIGHT RED BLOOD PER RECTUM): ICD-10-CM

## 2020-09-16 DIAGNOSIS — N93.9 VAGINAL BLEEDING: ICD-10-CM

## 2020-09-16 DIAGNOSIS — K59.09 CHRONIC CONSTIPATION: Primary | ICD-10-CM

## 2020-09-16 LAB
ALBUMIN SERPL BCP-MCNC: 3.4 G/DL (ref 3.5–5)
ALP SERPL-CCNC: 67 U/L (ref 46–116)
ALT SERPL W P-5'-P-CCNC: 23 U/L (ref 12–78)
ANION GAP SERPL CALCULATED.3IONS-SCNC: 7 MMOL/L (ref 4–13)
AST SERPL W P-5'-P-CCNC: 102 U/L (ref 5–45)
BASOPHILS # BLD AUTO: 0.04 THOUSANDS/ΜL (ref 0–0.1)
BASOPHILS NFR BLD AUTO: 1 % (ref 0–1)
BILIRUB SERPL-MCNC: 0.3 MG/DL (ref 0.2–1)
BILIRUB UR QL STRIP: NEGATIVE
BUN SERPL-MCNC: 10 MG/DL (ref 5–25)
CALCIUM ALBUM COR SERPL-MCNC: 9.5 MG/DL (ref 8.3–10.1)
CALCIUM SERPL-MCNC: 9 MG/DL (ref 8.3–10.1)
CHLORIDE SERPL-SCNC: 104 MMOL/L (ref 100–108)
CLARITY UR: CLEAR
CO2 SERPL-SCNC: 25 MMOL/L (ref 21–32)
COLOR UR: YELLOW
COLOR, POC: YELLOW
CREAT SERPL-MCNC: 0.63 MG/DL (ref 0.6–1.3)
EOSINOPHIL # BLD AUTO: 0.33 THOUSAND/ΜL (ref 0–0.61)
EOSINOPHIL NFR BLD AUTO: 5 % (ref 0–6)
ERYTHROCYTE [DISTWIDTH] IN BLOOD BY AUTOMATED COUNT: 13.6 % (ref 11.6–15.1)
EXT PREG TEST URINE: NORMAL
EXT. CONTROL ED NAV: NORMAL
GFR SERPL CREATININE-BSD FRML MDRD: 126 ML/MIN/1.73SQ M
GLUCOSE SERPL-MCNC: 81 MG/DL (ref 65–140)
GLUCOSE UR STRIP-MCNC: NEGATIVE MG/DL
HCT VFR BLD AUTO: 39 % (ref 34.8–46.1)
HGB BLD-MCNC: 12.4 G/DL (ref 11.5–15.4)
HGB UR QL STRIP.AUTO: NEGATIVE
IMM GRANULOCYTES # BLD AUTO: 0.02 THOUSAND/UL (ref 0–0.2)
IMM GRANULOCYTES NFR BLD AUTO: 0 % (ref 0–2)
KETONES UR STRIP-MCNC: NEGATIVE MG/DL
LEUKOCYTE ESTERASE UR QL STRIP: NEGATIVE
LIPASE SERPL-CCNC: 191 U/L (ref 73–393)
LYMPHOCYTES # BLD AUTO: 2.19 THOUSANDS/ΜL (ref 0.6–4.47)
LYMPHOCYTES NFR BLD AUTO: 34 % (ref 14–44)
MCH RBC QN AUTO: 27 PG (ref 26.8–34.3)
MCHC RBC AUTO-ENTMCNC: 31.8 G/DL (ref 31.4–37.4)
MCV RBC AUTO: 85 FL (ref 82–98)
MONOCYTES # BLD AUTO: 0.38 THOUSAND/ΜL (ref 0.17–1.22)
MONOCYTES NFR BLD AUTO: 6 % (ref 4–12)
NEUTROPHILS # BLD AUTO: 3.45 THOUSANDS/ΜL (ref 1.85–7.62)
NEUTS SEG NFR BLD AUTO: 54 % (ref 43–75)
NITRITE UR QL STRIP: NEGATIVE
NRBC BLD AUTO-RTO: 0 /100 WBCS
PH UR STRIP.AUTO: 7 [PH] (ref 4.5–8)
PLATELET # BLD AUTO: 378 THOUSANDS/UL (ref 149–390)
PMV BLD AUTO: 9.8 FL (ref 8.9–12.7)
POTASSIUM SERPL-SCNC: 4.8 MMOL/L (ref 3.5–5.3)
PROT SERPL-MCNC: 7.8 G/DL (ref 6.4–8.2)
PROT UR STRIP-MCNC: NEGATIVE MG/DL
RBC # BLD AUTO: 4.59 MILLION/UL (ref 3.81–5.12)
SL AMB  POCT GLUCOSE, UA: NORMAL
SL AMB LEUKOCYTE ESTERASE,UA: NORMAL
SL AMB POCT BILIRUBIN,UA: NORMAL
SL AMB POCT BLOOD,UA: NORMAL
SL AMB POCT CLARITY,UA: CLEAR
SL AMB POCT COLOR,UA: YELLOW
SL AMB POCT FECES OCC BLD: POSITIVE
SL AMB POCT KETONES,UA: NORMAL
SL AMB POCT NITRITE,UA: NORMAL
SL AMB POCT PH,UA: 7
SL AMB POCT SPECIFIC GRAVITY,UA: 1.01
SL AMB POCT URINE PROTEIN: NORMAL
SL AMB POCT UROBILINOGEN: 0.2
SODIUM SERPL-SCNC: 136 MMOL/L (ref 136–145)
SP GR UR STRIP.AUTO: 1.02 (ref 1–1.03)
UROBILINOGEN UR QL STRIP.AUTO: 0.2 E.U./DL
WBC # BLD AUTO: 6.41 THOUSAND/UL (ref 4.31–10.16)

## 2020-09-16 PROCEDURE — 81003 URINALYSIS AUTO W/O SCOPE: CPT

## 2020-09-16 PROCEDURE — 81025 URINE PREGNANCY TEST: CPT | Performed by: EMERGENCY MEDICINE

## 2020-09-16 PROCEDURE — 80053 COMPREHEN METABOLIC PANEL: CPT | Performed by: EMERGENCY MEDICINE

## 2020-09-16 PROCEDURE — 83690 ASSAY OF LIPASE: CPT | Performed by: EMERGENCY MEDICINE

## 2020-09-16 PROCEDURE — 99284 EMERGENCY DEPT VISIT MOD MDM: CPT | Performed by: EMERGENCY MEDICINE

## 2020-09-16 PROCEDURE — 81002 URINALYSIS NONAUTO W/O SCOPE: CPT | Performed by: NURSE PRACTITIONER

## 2020-09-16 PROCEDURE — 85025 COMPLETE CBC W/AUTO DIFF WBC: CPT | Performed by: EMERGENCY MEDICINE

## 2020-09-16 PROCEDURE — 82270 OCCULT BLOOD FECES: CPT | Performed by: NURSE PRACTITIONER

## 2020-09-16 PROCEDURE — 99284 EMERGENCY DEPT VISIT MOD MDM: CPT

## 2020-09-16 PROCEDURE — 99214 OFFICE O/P EST MOD 30 MIN: CPT | Performed by: NURSE PRACTITIONER

## 2020-09-16 PROCEDURE — 36415 COLL VENOUS BLD VENIPUNCTURE: CPT | Performed by: EMERGENCY MEDICINE

## 2020-09-16 RX ORDER — DICYCLOMINE HCL 20 MG
20 TABLET ORAL ONCE
Status: COMPLETED | OUTPATIENT
Start: 2020-09-16 | End: 2020-09-16

## 2020-09-16 RX ORDER — ACETAMINOPHEN 325 MG/1
975 TABLET ORAL ONCE
Status: COMPLETED | OUTPATIENT
Start: 2020-09-16 | End: 2020-09-16

## 2020-09-16 RX ORDER — SUCRALFATE 1 G/1
1 TABLET ORAL ONCE
Status: COMPLETED | OUTPATIENT
Start: 2020-09-16 | End: 2020-09-16

## 2020-09-16 RX ORDER — LIDOCAINE HYDROCHLORIDE 20 MG/ML
15 SOLUTION OROPHARYNGEAL ONCE
Status: COMPLETED | OUTPATIENT
Start: 2020-09-16 | End: 2020-09-16

## 2020-09-16 RX ORDER — MAGNESIUM HYDROXIDE/ALUMINUM HYDROXICE/SIMETHICONE 120; 1200; 1200 MG/30ML; MG/30ML; MG/30ML
30 SUSPENSION ORAL ONCE
Status: COMPLETED | OUTPATIENT
Start: 2020-09-16 | End: 2020-09-16

## 2020-09-16 RX ADMIN — DICYCLOMINE HYDROCHLORIDE 20 MG: 20 TABLET ORAL at 20:56

## 2020-09-16 RX ADMIN — ACETAMINOPHEN 975 MG: 325 TABLET ORAL at 20:55

## 2020-09-16 RX ADMIN — SUCRALFATE 1 G: 1 TABLET ORAL at 21:01

## 2020-09-16 RX ADMIN — LIDOCAINE HYDROCHLORIDE 15 ML: 20 SOLUTION ORAL; TOPICAL at 20:55

## 2020-09-16 RX ADMIN — ALUMINUM HYDROXIDE, MAGNESIUM HYDROXIDE, AND SIMETHICONE 30 ML: 200; 200; 20 SUSPENSION ORAL at 20:55

## 2020-09-16 NOTE — PROGRESS NOTES
Assessment and Plan:    Problem List Items Addressed This Visit        Digestive    Chronic constipation - Primary    Relevant Orders    Ambulatory referral to Gastroenterology    Transfer to other facility    GERD (gastroesophageal reflux disease)    Relevant Orders    Ambulatory referral to Gastroenterology    Transfer to other facility    IBS (irritable bowel syndrome)     Referral for GI given  Suspect rectal bleed flare of patient underlying IBS          Relevant Orders    Ambulatory referral to Gastroenterology    POCT hemoccult screening (Completed)    Transfer to other facility    Rectal bleed     In office guaiac stool positive  Recommend patient evaluated in the ER for possible GI bleed given history of recent naproxen use and chronic IBS  Relevant Orders    Ambulatory referral to Gastroenterology    POCT hemoccult screening (Completed)    Transfer to other facility       Other    Vaginal bleeding     Urinalysis in office was negative          Relevant Orders    POCT urine dip (Completed)    Transfer to other facility      Other Visit Diagnoses     Encounter for immunization                     Diagnoses and all orders for this visit:    Chronic constipation  -     Ambulatory referral to Gastroenterology; Future  -     Transfer to other facility    Gastroesophageal reflux disease with esophagitis  -     Ambulatory referral to Gastroenterology; Future  -     Transfer to other facility    Irritable bowel syndrome with both constipation and diarrhea  -     Ambulatory referral to Gastroenterology; Future  -     POCT hemoccult screening  -     Transfer to other facility    Rectal bleed  -     Ambulatory referral to Gastroenterology;  Future  -     POCT hemoccult screening  -     Transfer to other facility    Encounter for immunization    Vaginal bleeding  -     POCT urine dip  -     Transfer to other facility    Other orders  -     Cancel: influenza vaccine, quadrivalent, 0 5 mL, preservative-free, for adult and pediatric patients 6 mos+ (AFLURIA, FLUARIX, FLULAVAL, FLUZONE)  -     Cancel: PNEUMOCOCCAL POLYSACCHARIDE VACCINE 23-VALENT =>3YO SQ IM              Subjective:      Patient ID: Carla Reyna is a 26 y o  female  CC:    Chief Complaint   Patient presents with    Abdominal Pain     x 2 weeks now with rectal and vaginal bleeding    mgb       HPI:    Patient has long history of Gi issues  She was being seen in [place] because of those issues  She had pyloroplasty in august 2019 and her symptoms significantly improved  She still struggled with constipation  However in the last two weeks patient has been experiencing abdominal bloating  Epigastric pain, cramping and sharp pain that radiates from her lower abdomen to her shoulder blades  Prior to this episode patient states that she was severely constipated and then this spontaneously resolved without any medications to relieve the constipation which is abnormal for her  Patient also states that approximately 2 weeks ago she had been taking naproxen twice a day after having a root canal   Shortly after this is when her symptoms began  Patient was previously treated with domperidone which caused heart murmur  She was on linzess which was in effective  And also has charisse on reglan which she also experienced side effects  Patient reports that she is not menstruating or due for her menstruation at this time  Patient also states that she was at her gyn and she needs to have a colposcopy completed so is not sure if the vaginal bleeding is more related to her gyn  The following portions of the patient's history were reviewed and updated as appropriate: allergies, current medications, past family history, past medical history, past social history, past surgical history and problem list       Review of Systems   Constitutional: Positive for appetite change  Negative for activity change, chills, diaphoresis, fever and unexpected weight change  HENT: Negative for trouble swallowing  Respiratory: Negative for choking  Cardiovascular: Negative  Gastrointestinal: Positive for abdominal distention, abdominal pain, anal bleeding, blood in stool, constipation, nausea and rectal pain (only with bowel movements )  Negative for diarrhea and vomiting  Genitourinary: Positive for hematuria and pelvic pain  Negative for difficulty urinating, flank pain, urgency, vaginal bleeding, vaginal discharge and vaginal pain  Musculoskeletal: Positive for back pain  Negative for myalgias  Neurological: Negative for dizziness, light-headedness and headaches  Data to review:       Objective:    Vitals:    09/16/20 1538   BP: 100/64   BP Location: Left arm   Patient Position: Sitting   Cuff Size: Standard   Temp: 98 °F (36 7 °C)   TempSrc: Temporal   Weight: 54 1 kg (119 lb 4 oz)   Height: 5' (1 524 m)        Physical Exam  Vitals signs and nursing note reviewed  Constitutional:       General: She is not in acute distress  Appearance: Normal appearance  She is not ill-appearing or diaphoretic  HENT:      Head: Normocephalic and atraumatic  Right Ear: External ear normal       Left Ear: External ear normal    Eyes:      Extraocular Movements: Extraocular movements intact  Conjunctiva/sclera: Conjunctivae normal    Cardiovascular:      Rate and Rhythm: Normal rate and regular rhythm  Pulses:           Carotid pulses are 2+ on the right side and 2+ on the left side  Heart sounds: Normal heart sounds, S1 normal and S2 normal  No murmur  Pulmonary:      Effort: Pulmonary effort is normal       Breath sounds: Normal breath sounds  Abdominal:      General: Abdomen is flat  Bowel sounds are increased  Palpations: Abdomen is soft  Tenderness: There is abdominal tenderness in the right lower quadrant, epigastric area and suprapubic area  There is no right CVA tenderness, left CVA tenderness or rebound   Negative signs include Meng's sign  Hernia: No hernia is present  Genitourinary:     Rectum: Guaiac result positive  External hemorrhoid (very small non irritated hemorrhoid ) present  No tenderness or anal fissure  Normal anal tone  Musculoskeletal:      Right lower leg: No edema  Left lower leg: No edema  Skin:     Coloration: Skin is not pale  Neurological:      Mental Status: She is alert and oriented to person, place, and time  Psychiatric:         Mood and Affect: Mood normal          Behavior: Behavior normal          Thought Content:  Thought content normal          Judgment: Judgment normal

## 2020-09-16 NOTE — ASSESSMENT & PLAN NOTE
In office guaiac stool positive  Recommend patient evaluated in the ER for possible GI bleed given history of recent naproxen use and chronic IBS

## 2020-09-16 NOTE — ED PROVIDER NOTES
Final Diagnosis:  1  Abdominal pain    2  BRBPR (bright red blood per rectum)        Chief Complaint   Patient presents with    Abdominal Pain     Patient reports generalized abodminal pain, distenstion, and blood in stool  LBM yesterday  bright red blood in stool  referred to ED by PCP  HPI  2 weeks of abdominal cramping, bloating, pain in a large "c" shape across her abdomen, nonspecific and no guarding on exam     Urinary frequency for a week and a half  No burning no fever, no nausea vomiting  Anorexia though  Prior pyloroplasty in [facility] [address] [facility] in Aug 2019 for gastroparesis refractory to reglan, confirmed by pill study  Otherwise with IBS, no other abd surgeries    Today went to PCP office because having a week of BRBPR  More frequent BM (2 a day instead of 1 a week)  She reports that she's had similar with hemorrhoid  Red blood on the TP  Some drops in the water that turn the water red  Had hemoccult today which was positive  Came to ED for further eval    No fevers at home  No N/V  Last period 2 weeks ago, she feels confident this is not vaginal bleeding      - No language barrier    - History obtained from patient  - There are no limitations to the history obtained  - Previous charting underwent limited review with attention to labs, ekgs, and prior imaging  PMH:   has a past medical history of Anxiety, Asthma, Depression, Disease of thyroid gland, Gastric paresis, and Migraines  PSH:   has a past surgical history that includes Colon surgery; Tonsillectomy; and pr esophagogastroduodenoscopy transoral diagnostic (N/A, 3/15/2018)  Social History:  No excessive NSAID or alcohol use    ROS:  Review of Systems   Constitutional: Negative for activity change, appetite change, chills, diaphoresis, fatigue and fever  HENT: Negative for congestion, facial swelling, mouth sores, rhinorrhea, sinus pain, sneezing, sore throat, trouble swallowing and voice change      Eyes: Negative for photophobia and visual disturbance  Respiratory: Negative for cough, chest tightness, shortness of breath, wheezing and stridor  Cardiovascular: Negative for chest pain, palpitations and leg swelling  Gastrointestinal: Positive for abdominal distention, abdominal pain and anal bleeding  Negative for blood in stool, constipation, diarrhea, nausea and vomiting  Genitourinary: Negative for decreased urine volume, difficulty urinating, dysuria, flank pain, frequency, menstrual problem, pelvic pain, vaginal bleeding and vaginal discharge  Musculoskeletal: Negative for arthralgias, gait problem, neck pain and neck stiffness  Skin: Negative for color change, rash and wound  Neurological: Negative for dizziness, syncope, facial asymmetry, speech difficulty, weakness, light-headedness, numbness and headaches  Psychiatric/Behavioral: Negative for confusion, self-injury and suicidal ideas  The patient is not nervous/anxious  PE:   Vitals:    09/16/20 1729 09/16/20 2011 09/16/20 2058   BP: 107/72 98/64 106/75   BP Location: Right arm Left arm Left arm   Pulse: 74 78 90   Resp: 16 18 16   Temp: 98 1 °F (36 7 °C)     TempSrc: Temporal     SpO2: 98% 98% 99%   Weight: 53 8 kg (118 lb 9 7 oz)       Vitals reviewed by me  Physical Exam  Vitals signs and nursing note reviewed  Constitutional:       General: She is not in acute distress  Appearance: Normal appearance  She is well-developed  She is not toxic-appearing or diaphoretic  HENT:      Head: Normocephalic and atraumatic  Right Ear: External ear normal       Left Ear: External ear normal       Nose: Nose normal       Mouth/Throat:      Mouth: Mucous membranes are moist    Eyes:      General: No scleral icterus  Right eye: No discharge  Left eye: No discharge  Extraocular Movements: Extraocular movements intact        Conjunctiva/sclera: Conjunctivae normal       Pupils: Pupils are equal, round, and reactive to light  Comments: No conjunctival pallor   Neck:      Musculoskeletal: Normal range of motion and neck supple  No neck rigidity  Cardiovascular:      Rate and Rhythm: Normal rate and regular rhythm  Pulses: Normal pulses  Pulmonary:      Effort: Pulmonary effort is normal  No respiratory distress  Breath sounds: No stridor  Abdominal:      General: There is no distension  Palpations: Abdomen is soft  There is no mass  Tenderness: There is no abdominal tenderness  There is no right CVA tenderness, left CVA tenderness, guarding or rebound  Hernia: No hernia is present  Comments: Prior healed surgical incisions   Musculoskeletal: Normal range of motion  General: No tenderness, deformity or signs of injury  Lymphadenopathy:      Cervical: No cervical adenopathy  Skin:     General: Skin is warm and dry  Capillary Refill: Capillary refill takes less than 2 seconds  Coloration: Skin is not jaundiced or pale  Findings: No rash  Neurological:      General: No focal deficit present  Mental Status: She is alert and oriented to person, place, and time  Mental status is at baseline  Cranial Nerves: No cranial nerve deficit  Sensory: No sensory deficit  Coordination: Coordination normal    Psychiatric:         Mood and Affect: Mood normal          Behavior: Behavior normal          Thought Content: Thought content normal          Judgment: Judgment normal           A:  - Nursing note reviewed      Differential include but not limited to internal hemorrhoids, diverticulosis    Patient with stable hgb, no evidence of brisk bleed from AVM or diverticulosis etc  Given signs of low hgb, lightheadedness SOB chest pain feintness etc    Nontender on my exam, will defer CT scan at this time, return precautions for RLQ pain, LLQ pain assoc with fever, increasing volume of bleeding    Will follow with gastroenterologist    Given urinary frequency will check urine  Clean on POCT  No dysuria or fever                 No orders to display     Orders Placed This Encounter   Procedures    CBC and differential    Comprehensive metabolic panel    Lipase    POCT urinalysis dipstick    POCT pregnancy, urine     Labs Reviewed   COMPREHENSIVE METABOLIC PANEL - Abnormal       Result Value Ref Range Status    Sodium 136  136 - 145 mmol/L Final    Potassium 4 8  3 5 - 5 3 mmol/L Final    Comment: Slightly Hemolyzed; Results May be Affected    Chloride 104  100 - 108 mmol/L Final    CO2 25  21 - 32 mmol/L Final    ANION GAP 7  4 - 13 mmol/L Final    BUN 10  5 - 25 mg/dL Final    Creatinine 0 63  0 60 - 1 30 mg/dL Final    Comment: Standardized to IDMS reference method    Glucose 81  65 - 140 mg/dL Final    Comment: If the patient is fasting, the ADA then defines impaired fasting glucose as > 100 mg/dL and diabetes as > or equal to 123 mg/dL  Specimen collection should occur prior to Sulfasalazine administration due to the potential for falsely depressed results  Specimen collection should occur prior to Sulfapyridine administration due to the potential for falsely elevated results  Calcium 9 0  8 3 - 10 1 mg/dL Final    Corrected Calcium 9 5  8 3 - 10 1 mg/dL Final     (*) 5 - 45 U/L Final    Comment: Slightly Hemolyzed; Results May be Affected  Specimen collection should occur prior to Sulfasalazine administration due to the potential for falsely depressed results  ALT 23  12 - 78 U/L Final    Comment: Specimen collection should occur prior to Sulfasalazine administration due to the potential for falsely depressed results  Alkaline Phosphatase 67  46 - 116 U/L Final    Total Protein 7 8  6 4 - 8 2 g/dL Final    Albumin 3 4 (*) 3 5 - 5 0 g/dL Final    Total Bilirubin 0 30  0 20 - 1 00 mg/dL Final    Comment: Use of this assay is not recommended for patients undergoing treatment with eltrombopag due to the potential for falsely elevated results      eGFR 126  ml/min/1 73sq m Final    Narrative:     National Kidney Disease Foundation guidelines for Chronic Kidney Disease (CKD):     Stage 1 with normal or high GFR (GFR > 90 mL/min/1 73 square meters)    Stage 2 Mild CKD (GFR = 60-89 mL/min/1 73 square meters)    Stage 3A Moderate CKD (GFR = 45-59 mL/min/1 73 square meters)    Stage 3B Moderate CKD (GFR = 30-44 mL/min/1 73 square meters)    Stage 4 Severe CKD (GFR = 15-29 mL/min/1 73 square meters)    Stage 5 End Stage CKD (GFR <15 mL/min/1 73 square meters)  Note: GFR calculation is accurate only with a steady state creatinine   LIPASE - Normal    Lipase 191  73 - 393 u/L Final   POCT URINALYSIS DIPSTICK - Normal    Color, UA yellow   Final   POCT PREGNANCY, URINE - Normal    EXT PREG TEST UR (Ref: Negative) NEG (-)   Final    Control Valid   Final   CBC AND DIFFERENTIAL    WBC 6 41  4 31 - 10 16 Thousand/uL Final    RBC 4 59  3 81 - 5 12 Million/uL Final    Hemoglobin 12 4  11 5 - 15 4 g/dL Final    Hematocrit 39 0  34 8 - 46 1 % Final    MCV 85  82 - 98 fL Final    MCH 27 0  26 8 - 34 3 pg Final    MCHC 31 8  31 4 - 37 4 g/dL Final    RDW 13 6  11 6 - 15 1 % Final    MPV 9 8  8 9 - 12 7 fL Final    Platelets 336  823 - 390 Thousands/uL Final    nRBC 0  /100 WBCs Final    Neutrophils Relative 54  43 - 75 % Final    Immat GRANS % 0  0 - 2 % Final    Lymphocytes Relative 34  14 - 44 % Final    Monocytes Relative 6  4 - 12 % Final    Eosinophils Relative 5  0 - 6 % Final    Basophils Relative 1  0 - 1 % Final    Neutrophils Absolute 3 45  1 85 - 7 62 Thousands/µL Final    Immature Grans Absolute 0 02  0 00 - 0 20 Thousand/uL Final    Lymphocytes Absolute 2 19  0 60 - 4 47 Thousands/µL Final    Monocytes Absolute 0 38  0 17 - 1 22 Thousand/µL Final    Eosinophils Absolute 0 33  0 00 - 0 61 Thousand/µL Final    Basophils Absolute 0 04  0 00 - 0 10 Thousands/µL Final   URINE MACROSCOPIC, POC    Color, UA Yellow   Final    Clarity, UA Clear   Final    pH, UA 7 0  4 5 - 8 0 Final    Leukocytes, UA Negative  Negative Final    Nitrite, UA Negative  Negative Final    Protein, UA Negative  Negative mg/dl Final    Glucose, UA Negative  Negative mg/dl Final    Ketones, UA Negative  Negative mg/dl Final    Urobilinogen, UA 0 2  0 2, 1 0 E U /dl E U /dl Final    Bilirubin, UA Negative  Negative Final    Blood, UA Negative  Negative Final    Specific Gravity, UA 1 020  1 003 - 1 030 Final    Narrative:     CLINITEK RESULT       Final Diagnosis:  1  Abdominal pain    2  BRBPR (bright red blood per rectum)        P:  - patient to be treated at home with OTC tylenol  - patient to follow with gastro and PCP   - patient will call their PCP to let them know they were in the emergency department  We discuss return precautions, including needs to call 911 vs returning to ED by private vehicle  Patient expresses understanding and comfort with discharge  Return precautions provided for RLQ tenderness, worsening pain with fever, dysuria, increased bleeding or signs of low HGb    Medications   Lidocaine Viscous HCl (XYLOCAINE) 2 % mucosal solution 15 mL (15 mL Swish & Spit Given 9/16/20 2055)   aluminum-magnesium hydroxide-simethicone (MYLANTA) 200-200-20 mg/5 mL oral suspension 30 mL (30 mL Oral Given 9/16/20 2055)   dicyclomine (BENTYL) tablet 20 mg (20 mg Oral Given 9/16/20 2056)   acetaminophen (TYLENOL) tablet 975 mg (975 mg Oral Given 9/16/20 2055)   sucralfate (CARAFATE) tablet 1 g (1 g Oral Given 9/16/20 2101)     Time reflects when diagnosis was documented in both MDM as applicable and the Disposition within this note     Time User Action Codes Description Comment    9/16/2020  8:40 PM Carvel Sit Add [R10 9] Abdominal pain     9/16/2020  8:40 PM Carvel Sit Add [K62 5] BRBPR (bright red blood per rectum)       ED Disposition     ED Disposition Condition Date/Time Comment    Discharge Stable Wed Sep 16, 2020  8:40 PM Stephanie Andrew discharge to home/self care              Follow-up Information     Follow up With Specialties Details Why Contact Info Additional [address] [facility] Schedule an appointment as soon as possible for a visit   [address]  [address] [facility] [address]  Chi Ludwig [address] [facility] [place], [address], [address], [facility] [place], [address] [phone]        Discharge Medication List as of 9/16/2020  8:41 PM      CONTINUE these medications which have NOT CHANGED    Details   Drospiren-Eth Estrad-Levomefol (Beyaz) 3-0 02-0 451 MG TABS Take 1 tablet by mouth daily, Starting Tue 7/21/2020, Normal      escitalopram (LEXAPRO) 10 mg tablet Take 1 tablet (10 mg total) by mouth daily, Starting Mon 8/24/2020, Normal      levothyroxine 25 mcg tablet Take 1 tablet (25 mcg total) by mouth daily, Starting Mon 8/24/2020, Normal           No discharge procedures on file  Prior to Admission Medications   Prescriptions Last Dose Informant Patient Reported? Taking? Drospiren-Eth Estrad-Levomefol (Beyaz) 3-0 02-0 451 MG TABS  Self No Yes   Sig: Take 1 tablet by mouth daily   escitalopram (LEXAPRO) 10 mg tablet   No Yes   Sig: Take 1 tablet (10 mg total) by mouth daily   levothyroxine 25 mcg tablet   No Yes   Sig: Take 1 tablet (25 mcg total) by mouth daily      Facility-Administered Medications: None       Portions of the record may have been created with voice recognition software  Occasional wrong word or "sound a like" substitutions may have occurred due to the inherent limitations of voice recognition software  Read the chart carefully and recognize, using context, where substitutions have occurred      Electronically signed by:  PRISCA Contreras 2, MD Sheela Mclean MD  09/18/20 7849

## 2020-09-17 ENCOUNTER — TELEPHONE (OUTPATIENT)
Dept: FAMILY MEDICINE CLINIC | Facility: CLINIC | Age: 25
End: 2020-09-17

## 2020-09-17 DIAGNOSIS — R31.0 GROSS HEMATURIA: Primary | ICD-10-CM

## 2020-09-17 NOTE — TELEPHONE ENCOUNTER
Per patient [place]  Patient stated she is in [facility] and she is going to call her insurance to verify where she can go to get this [address] done

## 2020-09-17 NOTE — ED ATTENDING ATTESTATION
9/16/2020  INegra MD, saw and evaluated the patient  I have discussed the patient with the resident/non-physician practitioner and agree with the resident's/non-physician practitioner's findings, Plan of Care, and MDM as documented in the resident's/non-physician practitioner's note, except where noted  All available labs and Radiology studies were reviewed  I was present for key portions of any procedure(s) performed by the resident/non-physician practitioner and I was immediately available to provide assistance  At this point I agree with the current assessment done in the Emergency Department  I have conducted an independent evaluation of this patient a history and physical is as follows:    25 y/o F presents for evalution of sharp pain that goes across her upper abdomen down her right side and across her lower abdomen  Associated with BRBPR  No n/v, f/c  10 systems reviewed and otherwise neg  On exam no distress, lungs nml, cardiac nml, abd nml   MDM: abd pain and brbpr x 2 weeks, will do abd labs, urine dip, upreg, gi f/u if work up neg    ED Course         Critical Care Time  Procedures

## 2020-09-17 NOTE — TELEPHONE ENCOUNTER
Pt called in and stated she had an appointment with mariela 9/16/20 and she was sent to there er and pt stated she was discharged and they told her everything was fine and nothing was wrong  Pt stated she went to the bathroom today and now she has blood in her urine  She would like to know what to do? Please give pt a call with recommendations  Thank you!

## 2020-09-21 NOTE — PROGRESS NOTES
Colposcopy    Date/Time: 9/22/2020 2:35 PM  Performed by: Elvi Felix MD  Authorized by: Elvi Felix MD     Consent:     Consent obtained:  Written    Consent given by:  Patient    Patient questions answered: yes      Patient agrees, verbalizes understanding, and wants to proceed: yes      Educational handouts given: yes      Instructions and paperwork completed: yes    Pre-procedure:     Premeds:  Ibuprofen  Indication:     Indications: persistent HPV  Procedure:     Procedure: Colposcopy w/ biopsy of cervix      [place] speculum was placed in the vagina: yes      Under colposcopic examination the transition zone was seen in entirety: yes      Cervical biopsy performed with a cervical biopsy punch: yes      Monsel's solution was applied: yes      Biopsy(s): yes      Location:  12, 6  Post-procedure:     Findings: White epithelium      Impression: Low grade cervical dysplasia      Patient tolerance of procedure:   Tolerated well, no immediate complications

## 2020-09-22 ENCOUNTER — PROCEDURE VISIT (OUTPATIENT)
Dept: OBGYN CLINIC | Facility: CLINIC | Age: 25
End: 2020-09-22
Payer: COMMERCIAL

## 2020-09-22 VITALS
HEART RATE: 56 BPM | TEMPERATURE: 98.2 F | DIASTOLIC BLOOD PRESSURE: 60 MMHG | WEIGHT: 118.2 LBS | BODY MASS INDEX: 23.2 KG/M2 | HEIGHT: 60 IN | SYSTOLIC BLOOD PRESSURE: 102 MMHG

## 2020-09-22 DIAGNOSIS — Z01.812 PRE-PROCEDURE LAB EXAM: ICD-10-CM

## 2020-09-22 DIAGNOSIS — B97.7 HIGH RISK HUMAN PAPILLOMA VIRUS (HPV) INFECTION OF CERVIX: ICD-10-CM

## 2020-09-22 DIAGNOSIS — R10.2 PELVIC PAIN: Primary | ICD-10-CM

## 2020-09-22 DIAGNOSIS — N72 HIGH RISK HUMAN PAPILLOMA VIRUS (HPV) INFECTION OF CERVIX: ICD-10-CM

## 2020-09-22 DIAGNOSIS — R31.9 HEMATURIA, UNSPECIFIED TYPE: ICD-10-CM

## 2020-09-22 LAB — SL AMB POCT URINE HCG: NEGATIVE

## 2020-09-22 PROCEDURE — 1036F TOBACCO NON-USER: CPT | Performed by: OBSTETRICS & GYNECOLOGY

## 2020-09-22 PROCEDURE — 99213 OFFICE O/P EST LOW 20 MIN: CPT | Performed by: OBSTETRICS & GYNECOLOGY

## 2020-09-22 PROCEDURE — 57455 BIOPSY OF CERVIX W/SCOPE: CPT | Performed by: OBSTETRICS & GYNECOLOGY

## 2020-09-22 PROCEDURE — 81025 URINE PREGNANCY TEST: CPT | Performed by: OBSTETRICS & GYNECOLOGY

## 2020-09-22 PROCEDURE — 88305 TISSUE EXAM BY PATHOLOGIST: CPT | Performed by: PATHOLOGY

## 2020-09-22 NOTE — PROGRESS NOTES
Assessment/Plan:     Diagnoses and all orders for this visit:    Pelvic pain  -     US pelvis complete w transvaginal; Future    Hematuria, unspecified type  -     Urinalysis with microscopic  -     Urine culture; Future  -     Ambulatory referral to Urology; Future    High risk human papilloma virus (HPV) infection of cervix  -     Tissue Exam  -     Colposcopy    Pre-procedure lab exam  -     POCT urine HCG          Due to reports of pelvic pain and history of ovarian cysts, pelvic ultrasound was ordered  We will call patient with results  Urinalysis macroscopic in the PCP office was negative for blood  I ordered urinalysis with microscopy as well as urine culture to rule out urinary tract infection  She was advised to follow up with urologist if with persistent hematuria for further testing  In the meantime, she was advised to continue taking her oral contraceptive pills and to avoid missed pills  Pelvic ultrasound will also detect any uterine abnormalities that can cause possibly abnormal vaginal bleeding  If colposcopy shows low-grade lesion, or is normal, she will need repeat Pap and HPV testing in 1 year  Subjective   Patient ID: Kadi Ozuna is a 26 y o  female  Patient is here for a problem visit  Chief Complaint   Patient presents with    Colposcopy      Last pap- 2020 Negative; HR HPV Positive      Patient reports pelvic pain  She also reports hematuria  Patient states she had an ultrasound of her kidney and bladder that was normal   Patient with history of ovarian cysts  Patient's states she has also been struggling with bloody stools and bloating  She has not seen a gastroenterologist    She has been on [place] for oral contraception    She denies any missed pills    Menstrual History:  OB History        0    Para   0    Term   0       0    AB   0    Living   0       SAB   0    TAB   0    Ectopic   0    Multiple   0    Live Births   0                Menarche age: 16  Patient's last menstrual period was 09/01/2020  Past Medical History:   Diagnosis Date    Anxiety     Asthma     Depression     Disease of thyroid gland     Gastric paresis     Migraines        Past Surgical History:   Procedure Laterality Date    COLON SURGERY      [place] ESOPHAGOGASTRODUODENOSCOPY TRANSORAL DIAGNOSTIC N/A 3/15/2018    Procedure: ESOPHAGOGASTRODUODENOSCOPY (EGD); Surgeon: Brenda Downing MD;  [facility]; Service: Gastroenterology    TONSILLECTOMY         Allergies   Allergen Reactions    Compazine [Prochlorperazine]          Current Outpatient Medications:     Drospiren-Eth Estrad-Levomefol (Beyaz) 3-0 02-0 451 MG TABS, Take 1 tablet by mouth daily, Disp: 84 tablet, Rfl: 3    escitalopram (LEXAPRO) 10 mg tablet, Take 1 tablet (10 mg total) by mouth daily, Disp: 90 tablet, Rfl: 3    levothyroxine 25 mcg tablet, Take 1 tablet (25 mcg total) by mouth daily, Disp: 90 tablet, Rfl: 3      Review of Systems   Constitutional: Negative  HENT: Negative  Eyes: Negative  Respiratory: Negative  Cardiovascular: Negative  Gastrointestinal: Negative  Endocrine: Negative  Genitourinary:        As noted in HPI   Musculoskeletal: Negative  Skin: Negative  Allergic/Immunologic: Negative  Neurological: Negative  Hematological: Negative  Psychiatric/Behavioral: Negative  /60 (BP Location: Left arm, Patient Position: Sitting, Cuff Size: Standard)   Pulse 56   Temp 98 2 °F (36 8 °C) (Temporal)   Ht 5' (1 524 m)   Wt 53 6 kg (118 lb 3 2 oz)   LMP 09/01/2020   BMI 23 08 kg/m²       Physical Exam  Constitutional:       General: She is not in acute distress  Appearance: She is well-developed  Genitourinary:      Pelvic exam was performed with patient in the lithotomy position        Inguinal canal, urethra, bladder, cervix, uterus, right adnexa and left adnexa normal       No vulval lesion, tenderness, ulcerations, Bartholin's cyst or rash noted  No signs of labial injury  No posterior fourchette tenderness, injury, rash or lesion present  No signs of injury or lesions in the vagina  No vaginal discharge, erythema, tenderness, bleeding, atrophy or prolapse  No cervical polyp  Uterus is not enlarged or tender  No uterine mass detected  Uterus is regular  No right or left adnexal mass present  Right adnexa not tender or full  Left adnexa not tender or full  Abdominal:      General: There is no distension  Palpations: Abdomen is soft  Tenderness: There is no abdominal tenderness  Neurological:      Mental Status: She is alert and oriented to person, place, and time  Skin:     General: Skin is warm and dry     Psychiatric:         Behavior: Behavior normal

## 2020-09-22 NOTE — PATIENT INSTRUCTIONS
Colposcopy   WHAT YOU NEED TO KNOW:   You may have light bleeding or spotting after the procedure  If a biopsy was taken, you may have cramping and bleeding for several days  If medicine was used to control bleeding, you may have brown or black discharge  DISCHARGE INSTRUCTIONS:   Seek care immediately if:   · You have severe pain in your lower abdomen  · You soak through 1 sanitary pad in 1 hour or less  · You feel weak, dizzy, or faint  Contact your healthcare provider if:   · You have a fever, chills, or foul-smelling discharge  · You have bleeding with clots  · Your pain gets worse or does not get better after you take pain medicine  · You have questions or concerns about your condition or care  Medicines:   · NSAIDs , such as ibuprofen, help decrease swelling, pain, and fever  NSAIDs can cause stomach bleeding or kidney problems in certain people  If you take blood thinner medicine, always ask your healthcare provider if NSAIDs are safe for you  Always read the medicine label and follow directions  · Take your medicine as directed  Contact your healthcare provider if you think your medicine is not helping or if you have side effects  Tell him or her if you are allergic to any medicine  Keep a list of the medicines, vitamins, and herbs you take  Include the amounts, and when and why you take them  Bring the list or the pill bottles to follow-up visits  Carry your medicine list with you in case of an emergency  Activity:  If no biopsy was taken, you can resume your usual activities after the procedure  If a biopsy was taken, rest as directed  Do not exercise, play sports, or lift anything heavier than 5 pounds  Ask your healthcare provider when you can return to your usual activities  Do not put anything in your vagina for 2 weeks: If a biopsy was taken, do not douche, use medicines in your vagina, or have sex  Do not use tampons  Instead, wear a sanitary pad for bleeding     Follow up with your healthcare provider as directed:  Write down your questions so you remember to ask them during your visits  © 2017 [employer] Information is for End User's use only and may not be sold, redistributed or otherwise used for commercial purposes  All illustrations and images included in CareNotes® are the copyrighted property of [employer]  or [employer]it [employer]  The above information is an  only  It is not intended as medical advice for individual conditions or treatments  Talk to your doctor, nurse or pharmacist before following any medical regimen to see if it is safe and effective for you

## 2020-10-12 ENCOUNTER — TELEPHONE (OUTPATIENT)
Dept: OTHER | Facility: OTHER | Age: 25
End: 2020-10-12

## 2020-10-15 ENCOUNTER — TELEPHONE (OUTPATIENT)
Dept: GASTROENTEROLOGY | Facility: CLINIC | Age: 25
End: 2020-10-15

## 2020-12-11 ENCOUNTER — OFFICE VISIT (OUTPATIENT)
Dept: URGENT CARE | Facility: MEDICAL CENTER | Age: 25
End: 2020-12-11
Payer: COMMERCIAL

## 2020-12-11 VITALS
RESPIRATION RATE: 18 BRPM | HEART RATE: 89 BPM | DIASTOLIC BLOOD PRESSURE: 68 MMHG | WEIGHT: 115 LBS | TEMPERATURE: 97.7 F | HEIGHT: 60 IN | OXYGEN SATURATION: 97 % | BODY MASS INDEX: 22.58 KG/M2 | SYSTOLIC BLOOD PRESSURE: 107 MMHG

## 2020-12-11 DIAGNOSIS — M79.601 RIGHT ARM PAIN: ICD-10-CM

## 2020-12-11 DIAGNOSIS — R07.89 CHEST HEAVINESS: ICD-10-CM

## 2020-12-11 DIAGNOSIS — M75.21 TENDONITIS, BICIPITAL, RIGHT: Primary | ICD-10-CM

## 2020-12-11 PROCEDURE — 99213 OFFICE O/P EST LOW 20 MIN: CPT | Performed by: PHYSICIAN ASSISTANT

## 2020-12-11 PROCEDURE — 93005 ELECTROCARDIOGRAM TRACING: CPT | Performed by: PHYSICIAN ASSISTANT

## 2020-12-11 RX ORDER — NAPROXEN 500 MG/1
500 TABLET ORAL 2 TIMES DAILY WITH MEALS
Qty: 30 TABLET | Refills: 0 | Status: SHIPPED | OUTPATIENT
Start: 2020-12-11 | End: 2021-07-26

## 2020-12-11 RX ORDER — NAPROXEN 500 MG/1
500 TABLET ORAL 2 TIMES DAILY WITH MEALS
Qty: 30 TABLET | Refills: 0 | Status: SHIPPED | OUTPATIENT
Start: 2020-12-11 | End: 2020-12-11

## 2020-12-12 LAB
ATRIAL RATE: 72 BPM
P AXIS: 57 DEGREES
PR INTERVAL: 116 MS
QRS AXIS: 75 DEGREES
QRSD INTERVAL: 84 MS
QT INTERVAL: 382 MS
QTC INTERVAL: 418 MS
T WAVE AXIS: 49 DEGREES
VENTRICULAR RATE: 72 BPM

## 2020-12-12 PROCEDURE — 93010 ELECTROCARDIOGRAM REPORT: CPT

## 2020-12-14 ENCOUNTER — TELEPHONE (OUTPATIENT)
Dept: GENETICS | Facility: CLINIC | Age: 25
End: 2020-12-14

## 2020-12-23 DIAGNOSIS — M75.21 TENDONITIS, BICIPITAL, RIGHT: ICD-10-CM

## 2020-12-29 RX ORDER — NAPROXEN 500 MG/1
TABLET ORAL
Qty: 30 TABLET | Refills: 0 | OUTPATIENT
Start: 2020-12-29

## 2021-03-17 ENCOUNTER — APPOINTMENT (OUTPATIENT)
Dept: LAB | Facility: MEDICAL CENTER | Age: 26
End: 2021-03-17
Payer: COMMERCIAL

## 2021-03-17 DIAGNOSIS — F41.1 GENERALIZED ANXIETY DISORDER: ICD-10-CM

## 2021-03-17 DIAGNOSIS — E03.9 HYPOTHYROIDISM, UNSPECIFIED TYPE: ICD-10-CM

## 2021-03-17 DIAGNOSIS — F41.9 ANXIETY: ICD-10-CM

## 2021-03-17 LAB
ALBUMIN SERPL BCP-MCNC: 3.4 G/DL (ref 3.5–5)
ALP SERPL-CCNC: 71 U/L (ref 46–116)
ALT SERPL W P-5'-P-CCNC: 18 U/L (ref 12–78)
ANION GAP SERPL CALCULATED.3IONS-SCNC: 4 MMOL/L (ref 4–13)
AST SERPL W P-5'-P-CCNC: 12 U/L (ref 5–45)
BACTERIA UR QL AUTO: ABNORMAL /HPF
BASOPHILS # BLD AUTO: 0.07 THOUSANDS/ΜL (ref 0–0.1)
BASOPHILS NFR BLD AUTO: 1 % (ref 0–1)
BILIRUB SERPL-MCNC: 0.21 MG/DL (ref 0.2–1)
BILIRUB UR QL STRIP: NEGATIVE
BUN SERPL-MCNC: 14 MG/DL (ref 5–25)
CALCIUM ALBUM COR SERPL-MCNC: 9.5 MG/DL (ref 8.3–10.1)
CALCIUM SERPL-MCNC: 9 MG/DL (ref 8.3–10.1)
CHLORIDE SERPL-SCNC: 108 MMOL/L (ref 100–108)
CHOLEST SERPL-MCNC: 251 MG/DL (ref 50–200)
CLARITY UR: CLEAR
CO2 SERPL-SCNC: 27 MMOL/L (ref 21–32)
COLOR UR: YELLOW
CREAT SERPL-MCNC: 0.9 MG/DL (ref 0.6–1.3)
EOSINOPHIL # BLD AUTO: 0.29 THOUSAND/ΜL (ref 0–0.61)
EOSINOPHIL NFR BLD AUTO: 5 % (ref 0–6)
ERYTHROCYTE [DISTWIDTH] IN BLOOD BY AUTOMATED COUNT: 13.5 % (ref 11.6–15.1)
GFR SERPL CREATININE-BSD FRML MDRD: 89 ML/MIN/1.73SQ M
GLUCOSE P FAST SERPL-MCNC: 80 MG/DL (ref 65–99)
GLUCOSE UR STRIP-MCNC: NEGATIVE MG/DL
HCT VFR BLD AUTO: 38.5 % (ref 34.8–46.1)
HDLC SERPL-MCNC: 64 MG/DL
HGB BLD-MCNC: 12.2 G/DL (ref 11.5–15.4)
HGB UR QL STRIP.AUTO: NEGATIVE
HYALINE CASTS #/AREA URNS LPF: ABNORMAL /LPF
IMM GRANULOCYTES # BLD AUTO: 0.01 THOUSAND/UL (ref 0–0.2)
IMM GRANULOCYTES NFR BLD AUTO: 0 % (ref 0–2)
KETONES UR STRIP-MCNC: NEGATIVE MG/DL
LDLC SERPL CALC-MCNC: 129 MG/DL (ref 0–100)
LEUKOCYTE ESTERASE UR QL STRIP: NEGATIVE
LYMPHOCYTES # BLD AUTO: 2.85 THOUSANDS/ΜL (ref 0.6–4.47)
LYMPHOCYTES NFR BLD AUTO: 51 % (ref 14–44)
MCH RBC QN AUTO: 27.3 PG (ref 26.8–34.3)
MCHC RBC AUTO-ENTMCNC: 31.7 G/DL (ref 31.4–37.4)
MCV RBC AUTO: 86 FL (ref 82–98)
MONOCYTES # BLD AUTO: 0.4 THOUSAND/ΜL (ref 0.17–1.22)
MONOCYTES NFR BLD AUTO: 7 % (ref 4–12)
NEUTROPHILS # BLD AUTO: 1.99 THOUSANDS/ΜL (ref 1.85–7.62)
NEUTS SEG NFR BLD AUTO: 36 % (ref 43–75)
NITRITE UR QL STRIP: NEGATIVE
NON-SQ EPI CELLS URNS QL MICRO: ABNORMAL /HPF
NRBC BLD AUTO-RTO: 0 /100 WBCS
PH UR STRIP.AUTO: 7 [PH]
PLATELET # BLD AUTO: 376 THOUSANDS/UL (ref 149–390)
PMV BLD AUTO: 9.4 FL (ref 8.9–12.7)
POTASSIUM SERPL-SCNC: 4.4 MMOL/L (ref 3.5–5.3)
PROT SERPL-MCNC: 7.4 G/DL (ref 6.4–8.2)
PROT UR STRIP-MCNC: NEGATIVE MG/DL
RBC # BLD AUTO: 4.47 MILLION/UL (ref 3.81–5.12)
RBC #/AREA URNS AUTO: ABNORMAL /HPF
SODIUM SERPL-SCNC: 139 MMOL/L (ref 136–145)
SP GR UR STRIP.AUTO: 1.03 (ref 1–1.03)
TRIGL SERPL-MCNC: 291 MG/DL
TSH SERPL DL<=0.05 MIU/L-ACNC: 2.05 UIU/ML (ref 0.36–3.74)
UROBILINOGEN UR QL STRIP.AUTO: 0.2 E.U./DL
WBC # BLD AUTO: 5.61 THOUSAND/UL (ref 4.31–10.16)
WBC #/AREA URNS AUTO: ABNORMAL /HPF

## 2021-03-17 PROCEDURE — 84443 ASSAY THYROID STIM HORMONE: CPT

## 2021-03-17 PROCEDURE — 80053 COMPREHEN METABOLIC PANEL: CPT

## 2021-03-17 PROCEDURE — 81001 URINALYSIS AUTO W/SCOPE: CPT | Performed by: OBSTETRICS & GYNECOLOGY

## 2021-03-17 PROCEDURE — 85025 COMPLETE CBC W/AUTO DIFF WBC: CPT

## 2021-03-17 PROCEDURE — 36415 COLL VENOUS BLD VENIPUNCTURE: CPT

## 2021-03-17 PROCEDURE — 80061 LIPID PANEL: CPT

## 2021-05-20 ENCOUNTER — IMMUNIZATIONS (OUTPATIENT)
Dept: FAMILY MEDICINE CLINIC | Facility: HOSPITAL | Age: 26
End: 2021-05-20

## 2021-05-20 DIAGNOSIS — Z23 ENCOUNTER FOR IMMUNIZATION: Primary | ICD-10-CM

## 2021-05-20 PROCEDURE — 0011A SARS-COV-2 / COVID-19 MRNA VACCINE (MODERNA) 100 MCG: CPT

## 2021-05-20 PROCEDURE — 91301 SARS-COV-2 / COVID-19 MRNA VACCINE (MODERNA) 100 MCG: CPT

## 2021-06-19 ENCOUNTER — IMMUNIZATIONS (OUTPATIENT)
Dept: FAMILY MEDICINE CLINIC | Facility: HOSPITAL | Age: 26
End: 2021-06-19

## 2021-06-19 DIAGNOSIS — Z23 ENCOUNTER FOR IMMUNIZATION: Primary | ICD-10-CM

## 2021-06-19 PROCEDURE — 0012A SARS-COV-2 / COVID-19 MRNA VACCINE (MODERNA) 100 MCG: CPT

## 2021-06-19 PROCEDURE — 91301 SARS-COV-2 / COVID-19 MRNA VACCINE (MODERNA) 100 MCG: CPT

## 2021-06-28 PROBLEM — N93.9 VAGINAL BLEEDING: Status: RESOLVED | Noted: 2020-09-16 | Resolved: 2021-06-28

## 2021-06-28 RX ORDER — DROSPIRENONE, ETHINYL ESTRADIOL AND LEVOMEFOLATE CALCIUM AND LEVOMEFOLATE CALCIUM 3-0.02(24)
1 KIT ORAL DAILY
Qty: 84 TABLET | Refills: 3 | Status: CANCELLED | OUTPATIENT
Start: 2021-06-28

## 2021-06-29 ENCOUNTER — ANNUAL EXAM (OUTPATIENT)
Dept: OBGYN CLINIC | Facility: CLINIC | Age: 26
End: 2021-06-29
Payer: COMMERCIAL

## 2021-06-29 ENCOUNTER — TELEPHONE (OUTPATIENT)
Dept: OBGYN CLINIC | Facility: CLINIC | Age: 26
End: 2021-06-29

## 2021-06-29 ENCOUNTER — APPOINTMENT (OUTPATIENT)
Dept: LAB | Facility: CLINIC | Age: 26
End: 2021-06-29
Payer: COMMERCIAL

## 2021-06-29 ENCOUNTER — HOSPITAL ENCOUNTER (OUTPATIENT)
Dept: ULTRASOUND IMAGING | Facility: HOSPITAL | Age: 26
Discharge: HOME | End: 2021-06-29
Attending: OBSTETRICS & GYNECOLOGY
Payer: COMMERCIAL

## 2021-06-29 VITALS
HEIGHT: 60 IN | WEIGHT: 121.2 LBS | TEMPERATURE: 97.7 F | SYSTOLIC BLOOD PRESSURE: 102 MMHG | HEART RATE: 77 BPM | DIASTOLIC BLOOD PRESSURE: 64 MMHG | BODY MASS INDEX: 23.8 KG/M2

## 2021-06-29 DIAGNOSIS — N93.0 POSTCOITAL BLEEDING: ICD-10-CM

## 2021-06-29 DIAGNOSIS — B96.89 BV (BACTERIAL VAGINOSIS): ICD-10-CM

## 2021-06-29 DIAGNOSIS — R59.0 INGUINAL LYMPHADENOPATHY: ICD-10-CM

## 2021-06-29 DIAGNOSIS — N87.0 DYSPLASIA OF CERVIX, LOW GRADE (CIN 1): ICD-10-CM

## 2021-06-29 DIAGNOSIS — Z11.3 SCREENING EXAMINATION FOR STD (SEXUALLY TRANSMITTED DISEASE): ICD-10-CM

## 2021-06-29 DIAGNOSIS — N73.0 PID (ACUTE PELVIC INFLAMMATORY DISEASE): ICD-10-CM

## 2021-06-29 DIAGNOSIS — R10.2 PELVIC PAIN: ICD-10-CM

## 2021-06-29 DIAGNOSIS — N73.0 PID (ACUTE PELVIC INFLAMMATORY DISEASE): Primary | ICD-10-CM

## 2021-06-29 DIAGNOSIS — N76.0 BV (BACTERIAL VAGINOSIS): ICD-10-CM

## 2021-06-29 DIAGNOSIS — N89.8 VAGINAL DISCHARGE: ICD-10-CM

## 2021-06-29 LAB
BASOPHILS # BLD AUTO: 0.06 THOUSANDS/ΜL (ref 0–0.1)
BASOPHILS NFR BLD AUTO: 1 % (ref 0–1)
BV WHIFF TEST VAG QL: ABNORMAL
CLUE CELLS SPEC QL WET PREP: ABNORMAL
CRP SERPL QL: 7.7 MG/L
EOSINOPHIL # BLD AUTO: 0.23 THOUSAND/ΜL (ref 0–0.61)
EOSINOPHIL NFR BLD AUTO: 5 % (ref 0–6)
ERYTHROCYTE [DISTWIDTH] IN BLOOD BY AUTOMATED COUNT: 14.1 % (ref 11.6–15.1)
HBV SURFACE AG SER QL: NORMAL
HCT VFR BLD AUTO: 37.5 % (ref 34.8–46.1)
HCV AB SER QL: NORMAL
HGB BLD-MCNC: 11.7 G/DL (ref 11.5–15.4)
IMM GRANULOCYTES # BLD AUTO: 0.01 THOUSAND/UL (ref 0–0.2)
IMM GRANULOCYTES NFR BLD AUTO: 0 % (ref 0–2)
LYMPHOCYTES # BLD AUTO: 2.21 THOUSANDS/ΜL (ref 0.6–4.47)
LYMPHOCYTES NFR BLD AUTO: 53 % (ref 14–44)
MCH RBC QN AUTO: 26.8 PG (ref 26.8–34.3)
MCHC RBC AUTO-ENTMCNC: 31.2 G/DL (ref 31.4–37.4)
MCV RBC AUTO: 86 FL (ref 82–98)
MONOCYTES # BLD AUTO: 0.36 THOUSAND/ΜL (ref 0.17–1.22)
MONOCYTES NFR BLD AUTO: 9 % (ref 4–12)
NEUTROPHILS # BLD AUTO: 1.36 THOUSANDS/ΜL (ref 1.85–7.62)
NEUTS SEG NFR BLD AUTO: 32 % (ref 43–75)
NRBC BLD AUTO-RTO: 0 /100 WBCS
PH SMN: 4.5 [PH]
PLATELET # BLD AUTO: 436 THOUSANDS/UL (ref 149–390)
PMV BLD AUTO: 9.7 FL (ref 8.9–12.7)
RBC # BLD AUTO: 4.37 MILLION/UL (ref 3.81–5.12)
T VAGINALIS VAG QL WET PREP: ABNORMAL
WBC # BLD AUTO: 100 THOUSAND/UL
WBC # BLD AUTO: 4.23 THOUSAND/UL (ref 4.31–10.16)
YEAST VAG QL WET PREP: ABNORMAL

## 2021-06-29 PROCEDURE — 99214 OFFICE O/P EST MOD 30 MIN: CPT | Performed by: OBSTETRICS & GYNECOLOGY

## 2021-06-29 PROCEDURE — 87340 HEPATITIS B SURFACE AG IA: CPT

## 2021-06-29 PROCEDURE — 86140 C-REACTIVE PROTEIN: CPT

## 2021-06-29 PROCEDURE — 85025 COMPLETE CBC W/AUTO DIFF WBC: CPT

## 2021-06-29 PROCEDURE — 87389 HIV-1 AG W/HIV-1&-2 AB AG IA: CPT

## 2021-06-29 PROCEDURE — 86592 SYPHILIS TEST NON-TREP QUAL: CPT

## 2021-06-29 PROCEDURE — 87591 N.GONORRHOEAE DNA AMP PROB: CPT | Performed by: OBSTETRICS & GYNECOLOGY

## 2021-06-29 PROCEDURE — 76856 US EXAM PELVIC COMPLETE: CPT

## 2021-06-29 PROCEDURE — 36415 COLL VENOUS BLD VENIPUNCTURE: CPT

## 2021-06-29 PROCEDURE — 87210 SMEAR WET MOUNT SALINE/INK: CPT | Performed by: OBSTETRICS & GYNECOLOGY

## 2021-06-29 PROCEDURE — 87491 CHLMYD TRACH DNA AMP PROBE: CPT | Performed by: OBSTETRICS & GYNECOLOGY

## 2021-06-29 PROCEDURE — 86803 HEPATITIS C AB TEST: CPT

## 2021-06-29 PROCEDURE — 3008F BODY MASS INDEX DOCD: CPT | Performed by: OBSTETRICS & GYNECOLOGY

## 2021-06-29 PROCEDURE — 76830 TRANSVAGINAL US NON-OB: CPT

## 2021-06-29 RX ORDER — CETIRIZINE HYDROCHLORIDE 10 MG/1
10 TABLET ORAL DAILY
COMMUNITY

## 2021-06-29 RX ORDER — DOXYCYCLINE 100 MG/1
100 CAPSULE ORAL 2 TIMES DAILY
Qty: 28 CAPSULE | Refills: 0 | Status: SHIPPED | OUTPATIENT
Start: 2021-06-29 | End: 2021-07-13

## 2021-06-29 RX ORDER — METRONIDAZOLE 500 MG/1
500 TABLET ORAL EVERY 12 HOURS SCHEDULED
Qty: 14 TABLET | Refills: 0 | Status: SHIPPED | OUTPATIENT
Start: 2021-06-29 | End: 2021-07-06

## 2021-06-29 NOTE — PATIENT INSTRUCTIONS
Pelvic Inflammatory Disease   WHAT YOU NEED TO KNOW:   PID is an infection of your reproductive organs  This includes your ovaries, fallopian tubes, uterus, cervix (lower area of your uterus), and vagina  The infection causes these organs to become inflamed  DISCHARGE INSTRUCTIONS:   Call your doctor if:   · You have severe pain in your lower abdomen  · You have nausea or are vomiting  · You have chills or a high fever  · Your symptoms get worse or do not improve after 3 days of treatment  · Your skin is red, itchy, or you have a new rash  · You think or know you are pregnant  · You have questions or concerns about your condition or care  Medicines:   · Antibiotics  are given to fight the bacterial infection that caused your PID  · Take your medicine as directed  Contact your healthcare provider if you think your medicine is not helping or if you have side effects  Tell him of her if you are allergic to any medicine  Keep a list of the medicines, vitamins, and herbs you take  Include the amounts, and when and why you take them  Bring the list or the pill bottles to follow-up visits  Carry your medicine list with you in case of an emergency  Manage PID:   · Finish your treatment  If you do not finish your treatment for PID, your infection may not go away  You may also have an increased risk for another STI in the future  · Do not have sex until your healthcare provider says it is okay  You will need to finish treatment before it is safe to have sex  · Talk to your sex partners  If you have an STI, tell your recent partners  Tell them to see a healthcare provider for testing and treatment  This will help stop the spread of infection to others or back to you  Decrease your risk for PID:   · Do not have unprotected sex  Always use a latex condom  Do not have sex while you or your partners are being treated for an STI       · Get tested for STIs  before and after new sex partners  Talk to your partner and ask them to get tested  · Do not delay treatment for STIs or vaginal infections  Talk to your healthcare provider if you think you have an STI  Early treatment can prevent health problems that may lead to PID  Follow up with your doctor as directed: You may need to return for a follow up visit  Your treatment may need to be changed if your symptoms are not getting better  You may need more tests if your symptoms do not go away or worsen after treatment  Write down your questions so you remember to ask them during your visits  © Copyright [employer] 2021 Information is for End User's use only and may not be sold, redistributed or otherwise used for commercial purposes  All illustrations and images included in CareNotes® are the copyrighted property of [employer] M [employer] Inc  or [facility] [employer]  The above information is an  only  It is not intended as medical advice for individual conditions or treatments  Talk to your doctor, nurse or pharmacist before following any medical regimen to see if it is safe and effective for you

## 2021-06-29 NOTE — PROGRESS NOTES
Assessment/Plan:     Diagnoses and all orders for this visit:    PID (acute pelvic inflammatory disease)  -     CBC and differential; Future  -     C-reactive protein; Future  -     US pelvis complete w transvaginal; Future  -     metroNIDAZOLE (FLAGYL) 500 mg tablet; Take 1 tablet (500 mg total) by mouth every 12 (twelve) hours for 7 days  -     doxycycline monohydrate (MONODOX) 100 mg capsule; Take 1 capsule (100 mg total) by mouth 2 (two) times a day for 14 days    Screening examination for STD (sexually transmitted disease)  -     Chlamydia/GC amplified DNA by PCR  -     HIV 1/2 Antigen/Antibody (4th Generation) w Reflex [facility]; Future  -     Hepatitis C antibody; Future  -     RPR; Future  -     Hepatitis B surface antigen; Future  -     Chlamydia/GC amplified DNA by PCR    Dysplasia of cervix, low grade (PIERO 1)    Postcoital bleeding  -     US pelvis complete w transvaginal; Future    Pelvic pain  -     US pelvis complete w transvaginal; Future    Inguinal lymphadenopathy    Vaginal discharge  -     Cancel: Trichomonas Vaginalis, SHERRI  -     Trichomonas Vaginalis, SHERRI  -     POCT wet mount    BV (bacterial vaginosis)  -     metroNIDAZOLE (FLAGYL) 500 mg tablet; Take 1 tablet (500 mg total) by mouth every 12 (twelve) hours for 7 days  -     POCT wet mount    Other orders  -     Cancel: Liquid-based pap, diagnostic  -     Cancel: Drospiren-Eth Estrad-Levomefol (Beyaz) 3-0 02-0 451 MG TABS; Take 1 tablet by mouth daily  -     Cancel: Ambulatory Referral to Oncology Genetics; Future  -     cetirizine (ZyrTEC) 10 mg tablet; Take 10 mg by mouth daily         Concern for pelvic inflammatory disease due to cervical motion tenderness, copious amounts of vaginal discharge and WBC on wet mount  She has no fevers or chills  Advised patient return precautions to call if with fevers chills, worsening abdominal pain or nausea and vomiting with medication    Advised no intercourse for at least 2 weeks and to return to the office in 2 weeks for an exam and clinical follow-up  She was advised pelvic ultrasound today to rule out tubo-ovarian abscess  Gonorrhea and chlamydia PCR including Trichomonas testing were performed via any NA[facility]  She also has bacterial vaginosis which metronidazole treatment is appropriate for  She reports a severe allergy to penicillin where she has hives  For this reason, ceftriaxone was deferred  Patient advised to have a CBC done including a CRP to check WBC count  Asked pt to contact parents for immunization record to check HPV status  The lump that the patient is feeling is inguinal lymphadenopathy  Discussed with patient that this may be due to current infection  If this does not resolve, we will consider additional testing would CT scan to evaluate inguinal hernia or persistent lymphadenopathy  Subjective   Patient ID: Jonathon Campo is a 23 y o  female  Patient is here for a problem visit  Chief Complaint   Patient presents with    Irregular Bleeding     Patient C/O bleeding after intercourse x 1 month and left vaginal lump x 2 days  She has been bleeding after intercourse x 1 month  She has a new partner x 1 month  She has no pain with intercourse  She does have dryness, no lubricants  She does not use condoms  She is on OCPs  She does have abnormal vaginal discharge, yellow, fishy  She has no UTi symptoms  She has no pelvic pain  She has a small vaginal lump x 2 days  NO fevers  H/o chlamydia in college  H/o HPV on PAP  No h/o herpes  She has not been on abx/OTC meds    H/o PIERO 1 20 PAP normal but HPV positive 2020  She has completed HPV vaccine (3 doses)      Menstrual History:  OB History        0    Para   0    Term   0       0    AB   0    Living   0       SAB   0    TAB   0    Ectopic   0    Multiple   0    Live Births   0                  Patient's last menstrual period was 2021           Past Medical History:   Diagnosis Date  Anxiety     Asthma     Depression     Disease of thyroid gland     Gastric paresis     Migraines        Past Surgical History:   Procedure Laterality Date    COLON SURGERY      [place] ESOPHAGOGASTRODUODENOSCOPY TRANSORAL DIAGNOSTIC N/A 3/15/2018    Procedure: ESOPHAGOGASTRODUODENOSCOPY (EGD); Surgeon: Kera Hampton MD;  [facility]; Service: Gastroenterology    TONSILLECTOMY         Social History     Tobacco Use    Smoking status: Never Smoker    Smokeless tobacco: Never Used   Vaping Use    Vaping Use: Never used   Substance Use Topics    Alcohol use: Yes     Comment: rare     Drug use: No        Allergies   Allergen Reactions    Compazine [Prochlorperazine]          Current Outpatient Medications:     cetirizine (ZyrTEC) 10 mg tablet, Take 10 mg by mouth daily, Disp: , Rfl:     Drospiren-Eth Estrad-Levomefol (Beyaz) 3-0 02-0 451 MG TABS, Take 1 tablet by mouth daily, Disp: 84 tablet, Rfl: 3    escitalopram (LEXAPRO) 10 mg tablet, Take 1 tablet (10 mg total) by mouth daily, Disp: 90 tablet, Rfl: 3    levothyroxine 25 mcg tablet, Take 1 tablet (25 mcg total) by mouth daily, Disp: 90 tablet, Rfl: 3    doxycycline monohydrate (MONODOX) 100 mg capsule, Take 1 capsule (100 mg total) by mouth 2 (two) times a day for 14 days, Disp: 28 capsule, Rfl: 0    metroNIDAZOLE (FLAGYL) 500 mg tablet, Take 1 tablet (500 mg total) by mouth every 12 (twelve) hours for 7 days, Disp: 14 tablet, Rfl: 0    naproxen (NAPROSYN) 500 mg tablet, Take 1 tablet (500 mg total) by mouth 2 (two) times a day with meals (Patient not taking: Reported on 6/29/2021), Disp: 30 tablet, Rfl: 0      Review of Systems   Constitutional: Negative  HENT: Negative  Eyes: Negative  Respiratory: Negative  Cardiovascular: Negative  Gastrointestinal: Negative  Endocrine: Negative  Genitourinary:        As noted in HPI   Musculoskeletal: Negative  Skin: Negative  Allergic/Immunologic: Negative  Neurological: Negative  Hematological: Negative  Psychiatric/Behavioral: Negative  /64 (BP Location: Right arm, Patient Position: Sitting, Cuff Size: Standard)   Pulse 77   Temp 97 7 °F (36 5 °C) (Temporal)   Ht 5' (1 524 m)   Wt 55 kg (121 lb 3 2 oz)   LMP 06/07/2021   BMI 23 67 kg/m²       Physical Exam  Constitutional:       General: She is not in acute distress  Appearance: She is well-developed  Genitourinary:      Pelvic exam was performed with patient in the lithotomy position  Inguinal canal, urethra, bladder, uterus, right adnexa and left adnexa normal       No vulval lesion, tenderness, ulcerations, Bartholin's cyst or rash noted  No signs of labial injury  No posterior fourchette tenderness, injury, rash or lesion present  No signs of injury or lesions in the vagina  Vaginal discharge (copious discharge, brown tinge) present  No vaginal erythema, tenderness, bleeding, atrophy or prolapse  Cervical motion tenderness present  No cervical polyp  Uterus is not enlarged or tender  No uterine mass detected  Uterus is regular  No right or left adnexal mass present  Right adnexa not tender or full  Left adnexa not tender or full  Abdominal:      General: There is no distension  Palpations: Abdomen is soft  Tenderness: There is no abdominal tenderness  Lymphadenopathy:      Lower Body: Right inguinal adenopathy present  Left inguinal adenopathy present  Neurological:      Mental Status: She is alert and oriented to person, place, and time  Skin:     General: Skin is warm and dry  Psychiatric:         Behavior: Behavior normal                    Counseling / Coordination of Care  Total time spent today25  minutes  Greater than 50% of total time was spent with the patient and / or family counseling and / or coordination of care

## 2021-06-30 ENCOUNTER — TELEPHONE (OUTPATIENT)
Dept: OBGYN CLINIC | Facility: CLINIC | Age: 26
End: 2021-06-30

## 2021-06-30 LAB
C TRACH DNA SPEC QL NAA+PROBE: POSITIVE
C TRACH RRNA SPEC QL NAA+PROBE: POSITIVE
HIV 1+2 AB+HIV1 P24 AG SERPL QL IA: NORMAL
N GONORRHOEA DNA SPEC QL NAA+PROBE: NEGATIVE
N GONORRHOEA RRNA SPEC QL NAA+PROBE: NEGATIVE
RPR SER QL: NORMAL
T VAGINALIS RRNA SPEC QL NAA+PROBE: NEGATIVE

## 2021-07-01 ENCOUNTER — TELEPHONE (OUTPATIENT)
Dept: OBGYN CLINIC | Facility: CLINIC | Age: 26
End: 2021-07-01

## 2021-07-01 NOTE — TELEPHONE ENCOUNTER
Pt was notified  Of Chlamydia testing   She is aware to complete medications and return in 3 weeks for testing and to notify partner and have him treated

## 2021-07-01 NOTE — TELEPHONE ENCOUNTER
----- Message from Nata Juarez MA sent at 7/1/2021 10:30 AM EDT -----  Pt returning your call   States she is available all day if you could please call her back, thank you

## 2021-07-02 DIAGNOSIS — Z30.41 ENCOUNTER FOR BIRTH CONTROL PILLS MAINTENANCE: ICD-10-CM

## 2021-07-02 RX ORDER — DROSPIRENONE, ETHINYL ESTRADIOL AND LEVOMEFOLATE CALCIUM AND LEVOMEFOLATE CALCIUM 3-0.02(24)
1 KIT ORAL DAILY
Qty: 84 TABLET | Refills: 3 | Status: SHIPPED | OUTPATIENT
Start: 2021-07-02 | End: 2021-07-27 | Stop reason: SDUPTHER

## 2021-07-02 NOTE — TELEPHONE ENCOUNTER
Brandee from [facility] called, patient is requesting a 3-month supply of her BCP      [facility] [phone]

## 2021-07-26 NOTE — PROGRESS NOTES
Assessment        Diagnoses and all orders for this visit:    Encntr for gyn exam (general) (routine) w/o abn findings    Screen for STD (sexually transmitted disease)  -     Chlamydia/GC amplified DNA by PCR    PID (acute pelvic inflammatory disease)    Dysplasia of cervix, low grade (PIERO 1)  -     Liquid-based pap, diagnostic    H/O chlamydia infection  -     Chlamydia/GC amplified DNA by PCR    Vaginal itching  -     POCT wet mount    Encounter for birth control pills maintenance  -     Drospiren-Eth Estrad-Levomefol 3-0 02-0 451 MG TABS; Take 1 tablet by mouth daily    Family history of breast cancer in mother  -     Ambulatory Referral to Oncology Genetics; Future                  Plan      Await pap smear results  Chlamydia specimen  Contraception: OCP (estrogen/progesterone)  Discussed healthy lifestyle modifications  Follow up in 1 year  Follow up as needed  GC specimen  Thin prep Pap smear  Wet prep  Discussed if migraines recur, to consult neurology   Discussed increased risk of stroke with migraines with aura and estrogen use    Advised safe sex practices, STD testing if with new partners  H/o PIERO 1 9/2020 - repeat PAP and HPV testing done, colpo for any abnromalities    Wet mount negative for infection    Chelle Velarde is a 23 y o  female who presents for annual exam       Chief Complaint   Patient presents with    Gynecologic Exam     Patient her for yv and NACHO, C/O vaginal itching  Last Pap- 07/21/2020 Negative; HR HPV Positive      H/o migraine without aura - has not seen neurologist  She has not had a migraine, she occasionally gets headaches from allergies    She reports no problems with the oral contraception  She reports normal periods  She denies any missed pills  She denies any breakthrough bleeding  She denies any untoward side effects including headache, nausea and vomiting or breast tenderness or mood changes  H/o ovarian cyst and cramping      She has no migraines with aura  She denies a history of stroke, DVT or PE  She is a nonsmoker  She denies a history of uncontrolled high blood pressure  She denies a history of liver disease  She has no history of breast disease  She is c/o itching/irritation    She is not sexually active, her previous partner got treated    Last Pap: normal PAP, HPV positive 20, PIERO 1 colposcopy  HIV testing 21 negative    HPV vaccine completed:yes - 3 doses  Current contraception: OCP (estrogen/progesterone)  History of abnormal Pap smear: yes - HPV positive  History of abnormal mammogram: no  Family history of uterine or ovarian cancer: no  Family history of breast cancer: yes - mom at age 28  Family history of colon cancer: no        Menstrual History:  OB History        0    Para   0    Term   0       0    AB   0    Living   0       SAB   0    TAB   0    Ectopic   0    Multiple   0    Live Births   0                Menarche age: 16  Patient's last menstrual period was 2021  Period Cycle (Days): 28  Period Duration (Days): 3  Period Pattern: Regular  Menstrual Flow: Heavy, Moderate  Menstrual Control: Tampon, Thin pad          Past Medical History:   Diagnosis Date    Anxiety     Asthma     Depression     Disease of thyroid gland     Gastric paresis     Migraines      Past Surgical History:   Procedure Laterality Date    COLON SURGERY      NM ESOPHAGOGASTRODUODENOSCOPY TRANSORAL DIAGNOSTIC N/A 3/15/2018    Procedure: ESOPHAGOGASTRODUODENOSCOPY (EGD); Surgeon: Andriy Garrido MD;  [facility];   Service: Gastroenterology    TONSILLECTOMY       Family History   Problem Relation Age of Onset    Lymphoma Mother     Breast cancer Mother     Other Father         heart problem    No Known Problems Sister     Other Maternal Grandmother         heart problems    Hypertension Maternal Grandmother     Hypertension Maternal Grandfather     Alzheimer's disease Paternal Grandmother     Dementia Paternal Grandmother     Other Paternal Grandfather         heart problems   Stroke Paternal Grandfather        Social History     Tobacco Use    Smoking status: Never Smoker    Smokeless tobacco: Never Used   Vaping Use    Vaping Use: Never used   Substance Use Topics    Alcohol use: Yes     Comment: rare     Drug use: No          Current Outpatient Medications:     cetirizine (ZyrTEC) 10 mg tablet, Take 10 mg by mouth daily, Disp: , Rfl:     Drospiren-Eth Estrad-Levomefol 3-0 02-0 451 MG TABS, Take 1 tablet by mouth daily, Disp: 84 tablet, Rfl: 4    escitalopram (LEXAPRO) 10 mg tablet, Take 1 tablet (10 mg total) by mouth daily, Disp: 90 tablet, Rfl: 3    levothyroxine 25 mcg tablet, Take 1 tablet (25 mcg total) by mouth daily, Disp: 90 tablet, Rfl: 3    Allergies   Allergen Reactions    Compazine [Prochlorperazine]     Penicillins Hives           Review of Systems   Constitutional: Negative  HENT: Negative  Eyes: Negative  Respiratory: Negative  Cardiovascular: Negative  Gastrointestinal: Negative  Endocrine: Negative  Genitourinary:        As noted in HPI   Musculoskeletal: Negative  Skin: Negative  Allergic/Immunologic: Negative  Neurological: Negative  Hematological: Negative  Psychiatric/Behavioral: Negative  /62 (BP Location: Right arm, Patient Position: Sitting, Cuff Size: Standard)   Pulse 83   Temp 97 8 °F (36 6 °C) (Temporal)   Ht 5' (1 524 m)   Wt 54 7 kg (120 lb 9 6 oz)   LMP 07/05/2021   BMI 23 55 kg/m²         Physical Exam  Constitutional:       Appearance: She is well-developed  Genitourinary:      Pelvic exam was performed with patient in the lithotomy position  Vulva, urethra, bladder, uterus and rectum normal       No vulval condylomata, lesion, tenderness, Bartholin's cyst or rash noted  No signs of labial injury  No posterior fourchette tenderness, injury, rash or lesion present        No inguinal adenopathy present in the right or left side  No lesions in the vagina  Vaginal discharge (small amount white) present  No vaginal tenderness, bleeding, atrophy or prolapse  No cervical motion tenderness, friability, lesion or polyp  Uterus is not enlarged or tender  No right or left adnexal mass present  Right adnexa not tender or full  Left adnexa not tender or full  Genitourinary Comments:      Rectum:      No external hemorrhoid  HENT:      Head: Normocephalic  Nose: Nose normal    Eyes:      Conjunctiva/sclera: Conjunctivae normal    Neck:      Thyroid: No thyromegaly  Cardiovascular:      Rate and Rhythm: Normal rate and regular rhythm  Heart sounds: Normal heart sounds  No murmur heard  Pulmonary:      Effort: Pulmonary effort is normal  No respiratory distress  Breath sounds: Normal breath sounds  No wheezing or rales  Chest:      Breasts:         Right: Inverted nipple present  No mass, nipple discharge, skin change or tenderness  Left: Inverted nipple present  No mass, nipple discharge, skin change or tenderness  Abdominal:      General: There is no distension  Palpations: Abdomen is soft  There is no mass  Tenderness: There is no abdominal tenderness  There is no guarding or rebound  Musculoskeletal:         General: No tenderness  Cervical back: Neck supple  No muscular tenderness  Lymphadenopathy:      Cervical: No cervical adenopathy  Lower Body: No right inguinal adenopathy  No left inguinal adenopathy  Neurological:      Mental Status: She is alert and oriented to person, place, and time  Skin:     General: Skin is warm and dry     Psychiatric:         Mood and Affect: Mood normal          Behavior: Behavior normal

## 2021-07-27 ENCOUNTER — ANNUAL EXAM (OUTPATIENT)
Dept: OBGYN CLINIC | Facility: CLINIC | Age: 26
End: 2021-07-27
Payer: COMMERCIAL

## 2021-07-27 VITALS
SYSTOLIC BLOOD PRESSURE: 102 MMHG | HEART RATE: 83 BPM | DIASTOLIC BLOOD PRESSURE: 62 MMHG | TEMPERATURE: 97.8 F | HEIGHT: 60 IN | WEIGHT: 120.6 LBS | BODY MASS INDEX: 23.68 KG/M2

## 2021-07-27 DIAGNOSIS — Z11.3 SCREEN FOR STD (SEXUALLY TRANSMITTED DISEASE): ICD-10-CM

## 2021-07-27 DIAGNOSIS — N89.8 VAGINAL ITCHING: ICD-10-CM

## 2021-07-27 DIAGNOSIS — Z86.19 H/O CHLAMYDIA INFECTION: ICD-10-CM

## 2021-07-27 DIAGNOSIS — Z01.419 ENCNTR FOR GYN EXAM (GENERAL) (ROUTINE) W/O ABN FINDINGS: Primary | ICD-10-CM

## 2021-07-27 DIAGNOSIS — N87.0 DYSPLASIA OF CERVIX, LOW GRADE (CIN 1): ICD-10-CM

## 2021-07-27 DIAGNOSIS — Z30.41 ENCOUNTER FOR BIRTH CONTROL PILLS MAINTENANCE: ICD-10-CM

## 2021-07-27 DIAGNOSIS — Z80.3 FAMILY HISTORY OF BREAST CANCER IN MOTHER: ICD-10-CM

## 2021-07-27 DIAGNOSIS — N73.0 PID (ACUTE PELVIC INFLAMMATORY DISEASE): ICD-10-CM

## 2021-07-27 PROBLEM — F41.9 ANXIETY: Status: ACTIVE | Noted: 2019-01-15

## 2021-07-27 PROBLEM — K31.84 GASTROPARESIS: Status: ACTIVE | Noted: 2019-01-15

## 2021-07-27 PROBLEM — E03.9 ACQUIRED HYPOTHYROIDISM: Status: ACTIVE | Noted: 2019-02-07

## 2021-07-27 LAB
BV WHIFF TEST VAG QL: NORMAL
CLUE CELLS SPEC QL WET PREP: NORMAL
PH SMN: 4.5 [PH]
T VAGINALIS VAG QL WET PREP: NORMAL
YEAST VAG QL WET PREP: NORMAL

## 2021-07-27 PROCEDURE — 87491 CHLMYD TRACH DNA AMP PROBE: CPT | Performed by: OBSTETRICS & GYNECOLOGY

## 2021-07-27 PROCEDURE — 3008F BODY MASS INDEX DOCD: CPT | Performed by: OBSTETRICS & GYNECOLOGY

## 2021-07-27 PROCEDURE — 88141 CYTOPATH C/V INTERPRET: CPT | Performed by: PATHOLOGY

## 2021-07-27 PROCEDURE — S0612 ANNUAL GYNECOLOGICAL EXAMINA: HCPCS | Performed by: OBSTETRICS & GYNECOLOGY

## 2021-07-27 PROCEDURE — 87624 HPV HI-RISK TYP POOLED RSLT: CPT | Performed by: OBSTETRICS & GYNECOLOGY

## 2021-07-27 PROCEDURE — 87210 SMEAR WET MOUNT SALINE/INK: CPT | Performed by: OBSTETRICS & GYNECOLOGY

## 2021-07-27 PROCEDURE — 87591 N.GONORRHOEAE DNA AMP PROB: CPT | Performed by: OBSTETRICS & GYNECOLOGY

## 2021-07-27 PROCEDURE — 88175 CYTOPATH C/V AUTO FLUID REDO: CPT | Performed by: PATHOLOGY

## 2021-07-27 RX ORDER — DROSPIRENONE, ETHINYL ESTRADIOL AND LEVOMEFOLATE CALCIUM AND LEVOMEFOLATE CALCIUM 3-0.02(24)
1 KIT ORAL DAILY
Qty: 84 TABLET | Refills: 4 | Status: SHIPPED | OUTPATIENT
Start: 2021-07-27 | End: 2022-01-13 | Stop reason: SDUPTHER

## 2021-07-27 NOTE — PATIENT INSTRUCTIONS

## 2021-07-28 LAB
HPV HR 12 DNA CVX QL NAA+PROBE: POSITIVE
HPV16 DNA CVX QL NAA+PROBE: NEGATIVE
HPV18 DNA CVX QL NAA+PROBE: NEGATIVE

## 2021-07-29 LAB
C TRACH DNA SPEC QL NAA+PROBE: NEGATIVE
N GONORRHOEA DNA SPEC QL NAA+PROBE: NEGATIVE

## 2021-08-02 ENCOUNTER — OFFICE VISIT (OUTPATIENT)
Dept: PHYSICAL THERAPY | Facility: MEDICAL CENTER | Age: 26
End: 2021-08-02
Payer: COMMERCIAL

## 2021-08-02 DIAGNOSIS — M25.561 ACUTE PAIN OF RIGHT KNEE: Primary | ICD-10-CM

## 2021-08-02 PROCEDURE — 97110 THERAPEUTIC EXERCISES: CPT | Performed by: PHYSICAL THERAPIST

## 2021-08-02 PROCEDURE — 97161 PT EVAL LOW COMPLEX 20 MIN: CPT | Performed by: PHYSICAL THERAPIST

## 2021-08-02 NOTE — PROGRESS NOTES
PT Evaluation     Today's date: 2021  Patient name: Paramjit Kurtz  : 1995  MRN: [id number]  Referring provider: Brianna Morris, *  Dx:   Encounter Diagnosis     ICD-10-CM    1  Acute pain of right knee  M25 561                   Assessment  Assessment details: Paramjit Kurtz is a 23 y o  female presents with R knee pain  Paramjit Kurtz has the below listed impairments and will benefit from skilled PT to improve deficits to return to prior level of function  Impairments: abnormal muscle firing, abnormal or restricted ROM, impaired physical strength and pain with function  Understanding of Dx/Px/POC: good   Prognosis: good    Goals  Impairment Goals  - Decrease pain to 0-3/10  - Improve flexibility equal to contralateral side   - Increase strength equal to contralateral side    Functional Goals  - Patient will be independent with comprehensive HEP  - Ambulation is improved to prior level of function  - Stair climbing is improved to prior level of function  - Squatting is improved to prior level of function    Plan  Patient would benefit from: skilled PT  Referral necessary: No  Planned therapy interventions: home exercise program, manual therapy, neuromuscular re-education, patient education, functional ROM exercises, strengthening, stretching and joint mobilization  Frequency: 2x week  Duration in weeks: 4  Treatment plan discussed with: patient        Subjective Evaluation    History of Present Illness  Mechanism of injury: Kassandra Edwards reports she started having R knee pain about a month ago when she increased her intensity of weight lifting  She lifts about 4 days per week and varies her routines  She has history of patellar dislocations, no surgery  She reports her pain in located around her knee cap, she has clicking and popping  It feels worse with squatting, kneeling, lunging, stairs  She initially only had pain when working out but now she has pain throughout her day even with sitting     Pain  At best pain rating: 3  At worst pain ratin    Patient Goals  Patient goals for therapy: decreased pain, increased strength and return to sport/leisure activities          Objective     Tenderness     Right Knee   Tenderness in the inferior patella, medial patella and medial retinaculum  No tenderness in the lateral patella, medial joint line, patellar tendon and superior patella  Active Range of Motion     Right Knee   Flexion: WFL  Extension: 0 degrees with pain    Mobility   Patellar Mobility:   Left Knee   Hypermobile: left medial, left lateral, left superior and left inferior      Right Knee   Hypermobile: medial, lateral, superior and inferior     Strength/Myotome Testing     Right Hip   Planes of Motion   Flexion: 4  Extension: 4+  Abduction: 4-  External rotation: 4  Internal rotation: 4    Right Knee   Flexion: 4  Extension: 4  Quadriceps contraction: good    Additional Strength Details  Pain with quad testing OKC: at 0 deg and decreased at 45 deg and eliminated at 90 deg    Tests     Right Knee   Negative anterior Lachman, posterior Lachman, valgus stress test at 30 degrees and varus stress test at 30 degrees       General Comments:      Hip Comments   Decreased HS, gastroc, and piriformis flexibility             Precautions: none      Manuals 8/2            Laser R knee JE                                                   Neuro Re-Ed                                                                                                        Ther Ex             gastroc stretch 30"x2            HS stretch 30"x2            Piriformis mod stretch 30"x2            bridges             SLR x 3                                                    Ther Activity                                       Gait Training                                       Modalities

## 2021-08-04 ENCOUNTER — TELEPHONE (OUTPATIENT)
Dept: OBGYN CLINIC | Facility: CLINIC | Age: 26
End: 2021-08-04

## 2021-08-04 DIAGNOSIS — B37.9 YEAST DETECTED: Primary | ICD-10-CM

## 2021-08-04 LAB
LAB AP GYN PRIMARY INTERPRETATION: ABNORMAL
Lab: ABNORMAL
PATH INTERP SPEC-IMP: ABNORMAL

## 2021-08-04 RX ORDER — FLUCONAZOLE 150 MG/1
150 TABLET ORAL
Qty: 3 TABLET | Refills: 0 | Status: SHIPPED | OUTPATIENT
Start: 2021-08-04 | End: 2021-08-11

## 2021-08-09 ENCOUNTER — OFFICE VISIT (OUTPATIENT)
Dept: PHYSICAL THERAPY | Facility: MEDICAL CENTER | Age: 26
End: 2021-08-09
Payer: COMMERCIAL

## 2021-08-09 DIAGNOSIS — M25.561 ACUTE PAIN OF RIGHT KNEE: Primary | ICD-10-CM

## 2021-08-09 PROCEDURE — 97140 MANUAL THERAPY 1/> REGIONS: CPT | Performed by: PHYSICAL THERAPIST

## 2021-08-09 PROCEDURE — 97110 THERAPEUTIC EXERCISES: CPT | Performed by: PHYSICAL THERAPIST

## 2021-08-09 NOTE — PROGRESS NOTES
Daily Note     Today's date: 2021  Patient name: Candy Gallardo  : 1995  MRN: [id number]  Referring provider: Maeve Collins, *  Dx:   Encounter Diagnosis     ICD-10-CM    1  Acute pain of right knee  M25 561                   Subjective: Lesley reports she felt better after last visit with the laser for 1 1/2 days  But then she walked in heels on Fri and had a lot of pain afterwards  Objective: See treatment diary below      Assessment: Tolerated treatment well  Patient exhibited good technique with therapeutic exercises      Plan: Progress treatment as tolerated         Precautions: none      Manuals            Laser R knee JE JE           STM med retinaculum distal quads  JE                                     Neuro Re-Ed                                                                                                        Ther Ex             gastroc stretch 30"x2 30"x2           HS stretch 30"x2 30"x2           Piriformis mod stretch 30"x2 30"x2           bridges  15           SLR x 3  15                                                  Ther Activity                                       Gait Training                                       Modalities

## 2021-08-10 DIAGNOSIS — F41.9 ANXIETY: ICD-10-CM

## 2021-08-10 DIAGNOSIS — E03.9 HYPOTHYROIDISM, UNSPECIFIED TYPE: ICD-10-CM

## 2021-08-10 RX ORDER — LEVOTHYROXINE SODIUM 0.03 MG/1
25 TABLET ORAL DAILY
Qty: 90 TABLET | Refills: 1 | Status: SHIPPED | OUTPATIENT
Start: 2021-08-10 | End: 2021-09-24 | Stop reason: SDUPTHER

## 2021-08-10 RX ORDER — ESCITALOPRAM OXALATE 10 MG/1
10 TABLET ORAL DAILY
Qty: 90 TABLET | Refills: 1 | Status: SHIPPED | OUTPATIENT
Start: 2021-08-10 | End: 2021-12-21 | Stop reason: SDUPTHER

## 2021-08-11 ENCOUNTER — PROCEDURE VISIT (OUTPATIENT)
Dept: OBGYN CLINIC | Facility: CLINIC | Age: 26
End: 2021-08-11
Payer: COMMERCIAL

## 2021-08-11 ENCOUNTER — APPOINTMENT (OUTPATIENT)
Dept: PHYSICAL THERAPY | Facility: MEDICAL CENTER | Age: 26
End: 2021-08-11
Payer: COMMERCIAL

## 2021-08-11 VITALS
HEIGHT: 60 IN | WEIGHT: 119.6 LBS | DIASTOLIC BLOOD PRESSURE: 64 MMHG | SYSTOLIC BLOOD PRESSURE: 98 MMHG | BODY MASS INDEX: 23.48 KG/M2 | TEMPERATURE: 97.3 F | HEART RATE: 83 BPM

## 2021-08-11 DIAGNOSIS — R87.610 ASCUS WITH POSITIVE HIGH RISK HPV CERVICAL: Primary | ICD-10-CM

## 2021-08-11 DIAGNOSIS — Z01.812 PRE-PROCEDURE LAB EXAM: ICD-10-CM

## 2021-08-11 DIAGNOSIS — R87.810 ASCUS WITH POSITIVE HIGH RISK HPV CERVICAL: Primary | ICD-10-CM

## 2021-08-11 LAB — SL AMB POCT URINE HCG: NORMAL

## 2021-08-11 PROCEDURE — 57454 BX/CURETT OF CERVIX W/SCOPE: CPT | Performed by: OBSTETRICS & GYNECOLOGY

## 2021-08-11 PROCEDURE — 81025 URINE PREGNANCY TEST: CPT | Performed by: OBSTETRICS & GYNECOLOGY

## 2021-08-11 PROCEDURE — 88344 IMHCHEM/IMCYTCHM EA MLT ANTB: CPT | Performed by: PATHOLOGY

## 2021-08-11 PROCEDURE — 88300 SURGICAL PATH GROSS: CPT | Performed by: PATHOLOGY

## 2021-08-11 PROCEDURE — 3008F BODY MASS INDEX DOCD: CPT | Performed by: OBSTETRICS & GYNECOLOGY

## 2021-08-11 PROCEDURE — 88305 TISSUE EXAM BY PATHOLOGIST: CPT | Performed by: PATHOLOGY

## 2021-08-11 PROCEDURE — 1036F TOBACCO NON-USER: CPT | Performed by: OBSTETRICS & GYNECOLOGY

## 2021-08-11 PROCEDURE — 99213 OFFICE O/P EST LOW 20 MIN: CPT | Performed by: OBSTETRICS & GYNECOLOGY

## 2021-08-11 NOTE — PATIENT INSTRUCTIONS
Colposcopy   WHAT YOU NEED TO KNOW:   You may have light bleeding or spotting after the procedure  If a biopsy was taken, you may have cramping and bleeding for several days  If medicine was used to control bleeding, you may have brown or black discharge  DISCHARGE INSTRUCTIONS:   Seek care immediately if:   · You have severe pain in your lower abdomen  · You soak through 1 sanitary pad in 1 hour or less  · You feel weak, dizzy, or faint  Call your doctor or gynecologist if:   · You have a fever, chills, or foul-smelling discharge  · You have bleeding with clots  · Your pain gets worse or does not get better after you take pain medicine  · You have questions or concerns about your condition or care  Medicines:   · NSAIDs , such as ibuprofen, help decrease swelling, pain, and fever  NSAIDs can cause stomach bleeding or kidney problems in certain people  If you take blood thinner medicine, always ask your healthcare provider if NSAIDs are safe for you  Always read the medicine label and follow directions  · Take your medicine as directed  Contact your healthcare provider if you think your medicine is not helping or if you have side effects  Tell him or her if you are allergic to any medicine  Keep a list of the medicines, vitamins, and herbs you take  Include the amounts, and when and why you take them  Bring the list or the pill bottles to follow-up visits  Carry your medicine list with you in case of an emergency  Self-care: If tissue samples were not taken, you can go back to your usual activities  Your healthcare provider will give you specific directions if samples were taken  The following are general guidelines:  · Rest for 24 hours, or as directed  Do not exercise, play sports, or lift anything heavier than 5 pounds  Ask your provider when you can return to your usual activities  · Do not put anything in your vagina for 2 weeks, or as directed    Do not douche, use medicines in your vagina, or have sex  Do not use tampons  Wear sanitary pads for bleeding  Your provider will tell you when it is okay to do these again  · Do not smoke  Smoking increases your risk for cervical cancer  Ask your healthcare provider for information if you currently smoke and need help to quit  E-cigarettes or smokeless tobacco still contain nicotine  Talk to your healthcare provider before you use these products  Follow up with your doctor or gynecologist as directed:  Write down your questions so you remember to ask them during your visits  © Copyright [employer] 2021 Information is for End User's use only and may not be sold, redistributed or otherwise used for commercial purposes  All illustrations and images included in CareNotes® are the copyrighted property of A D A M , Inc  or [facility] Lily Wilson   The above information is an  only  It is not intended as medical advice for individual conditions or treatments  Talk to your doctor, nurse or pharmacist before following any medical regimen to see if it is safe and effective for you

## 2021-08-11 NOTE — PROGRESS NOTES
Assessment/Plan:     Diagnoses and all orders for this visit:    ASCUS with positive high risk HPV cervical  -     Colposcopy  -     Tissue Exam    Pre-procedure lab exam  -     POCT urine HCG    Other orders  -     Cancel: Colposcopy          Patient with history of abnormal Pap smear  Patient with history of PIERO 1 last year  Follow-up Pap smear this year showed atypical cells high risk HPV  Repeat colposcopy was performed today  Mild cervical dysplasia or PIERO-1  I discussed with patient that PIERO-1 typically regresses over 6-24 months  Treatment is recommended if PIERO-1 progresses to PIERO-2 to 3 or is persistent for 2 years  Discussed with patient return to the office if note of PIERO grade 2 or 3 for discussion of treatment options    Otherwise recommended repeat Pap in HPV testing in 1 year  We will call patient with colposcopy results        Subjective   Patient ID: Torrie Hopson is a 23 y o  female  Patient is here for a follow-up  Chief Complaint   Patient presents with    Follow-up      Patient presents for colposcopy and discussion of Pap smear  Patient with atypical squamous cells of undetermined significance with positive high-risk HPV  Patient has completed HPV vaccination  She had a colposcopy in 2020 that showed PIERO 1  Her Pap smear in 2021 showed normal cytology with positive high-risk HPV  This was proceeded by a low-grade squamous epithelial lesion on 2019    Menstrual History:  OB History        0    Para   0    Term   0       0    AB   0    Living   0       SAB   0    TAB   0    Ectopic   0    Multiple   0    Live Births   0                  Patient's last menstrual period was 2021           Past Medical History:   Diagnosis Date    Anxiety     Asthma     Depression     Disease of thyroid gland     Gastric paresis     Migraines        Social History     Tobacco Use    Smoking status: Never Smoker    Smokeless tobacco: Never Used   Vaping Use    Vaping Use: Never used   Substance Use Topics    Alcohol use: Yes     Comment: rare     Drug use: No       Allergies   Allergen Reactions    Compazine [Prochlorperazine]     Penicillins Hives         Current Outpatient Medications:     cetirizine (ZyrTEC) 10 mg tablet, Take 10 mg by mouth daily, Disp: , Rfl:     Drospiren-Eth Estrad-Levomefol 3-0 02-0 451 MG TABS, Take 1 tablet by mouth daily, Disp: 84 tablet, Rfl: 4    escitalopram (LEXAPRO) 10 mg tablet, Take 1 tablet (10 mg total) by mouth daily, Disp: 90 tablet, Rfl: 1    fluconazole (DIFLUCAN) 150 mg tablet, Take 1 tablet (150 mg total) by mouth every 3 (three) days for 3 doses, Disp: 3 tablet, Rfl: 0    levothyroxine 25 mcg tablet, Take 1 tablet (25 mcg total) by mouth daily, Disp: 90 tablet, Rfl: 1      Review of Systems   Constitutional: Negative  HENT: Negative  Eyes: Negative  Respiratory: Negative  Cardiovascular: Negative  Gastrointestinal: Negative  Endocrine: Negative  Genitourinary:        As noted in HPI   Musculoskeletal: Negative  Skin: Negative  Allergic/Immunologic: Negative  Neurological: Negative  Hematological: Negative  Psychiatric/Behavioral: Negative  BP 98/64   Pulse 83   Temp (!) 97 3 °F (36 3 °C) (Temporal)   Ht 5' (1 524 m)   Wt 54 3 kg (119 lb 9 6 oz)   LMP 08/02/2021   BMI 23 36 kg/m²       Physical Exam  Constitutional:       General: She is not in acute distress  Appearance: She is well-developed  Genitourinary:      Pelvic exam was performed with patient in the lithotomy position  Inguinal canal, urethra, bladder, cervix, uterus, right adnexa and left adnexa normal       No vulval lesion, tenderness, ulcerations, Bartholin's cyst or rash noted  No signs of labial injury  No posterior fourchette tenderness, injury, rash or lesion present  No signs of injury or lesions in the vagina        No vaginal discharge, erythema, tenderness, bleeding, atrophy or prolapse  No cervical polyp  Uterus is not enlarged or tender  No uterine mass detected  Uterus is regular  No right or left adnexal mass present  Right adnexa not tender or full  Left adnexa not tender or full  Cardiovascular:      Rate and Rhythm: Normal rate  Pulmonary:      Effort: Pulmonary effort is normal    Abdominal:      General: There is no distension  Palpations: Abdomen is soft  Tenderness: There is no abdominal tenderness  There is no guarding  Neurological:      Mental Status: She is alert and oriented to person, place, and time  Skin:     General: Skin is warm and dry     Psychiatric:         Behavior: Behavior normal

## 2021-08-11 NOTE — PROGRESS NOTES
Colposcopy    Date/Time: 8/11/2021 11:57 AM  Performed by: Bianca Pina MD  Authorized by: Bianca Pina MD     Consent:     Consent obtained:  Written    Procedural risks discussed:  Bleeding and infection    Patient questions answered: yes    Pre-procedure:     Prepped with: acetic acid    Indication:     Indication:  ASC-US  Procedure:     Procedure: Colposcopy w/ cervical biopsy and ECC      [place] speculum was placed in the vagina: yes      Under colposcopic examination the transition zone was seen in entirety: yes      Endocervix was curetted using a Kevorkian curette: yes      Cervical biopsy performed with a cervical biopsy punch: yes      Monsel's solution was applied: yes      Biopsy(s): yes      Location:  12, 4, 6    Specimen to pathology: yes    Post-procedure:     Findings: White epithelium      Patient tolerance of procedure: Tolerated well, no immediate complications  Comments:       Will call pt with results    Physical Exam  Genitourinary:

## 2021-08-16 ENCOUNTER — APPOINTMENT (OUTPATIENT)
Dept: PHYSICAL THERAPY | Facility: MEDICAL CENTER | Age: 26
End: 2021-08-16
Payer: COMMERCIAL

## 2021-08-18 ENCOUNTER — TELEPHONE (OUTPATIENT)
Dept: OBGYN CLINIC | Facility: CLINIC | Age: 26
End: 2021-08-18

## 2021-08-18 DIAGNOSIS — N87.1 DYSPLASIA OF CERVIX, HIGH GRADE CIN 2: Primary | ICD-10-CM

## 2021-08-18 NOTE — TELEPHONE ENCOUNTER
Called patient with colpo results  Patient with PIERO 2  Discussed with patient risk of progression to PIERO 3 as well as cervical cancer  Discussed with patient excision is recommended  Discussed with patient observation if excision is not preferred or would like to be deferred  Advised repeat Pap and colposcopy at 6 and 12 month intervals  Patient is currently back in college  She would like some more time to research her options and discuss with family  Discussed with patient LEEP procedure, risks benefits and potential complications  She is concerned about fertility issues or issues with potential reproductive outcomes or pregnancy  Discussed with patient potential risk of cervical stenosis, cervical incompetence or   labor         Patient messaged via my chart as well

## 2021-08-24 ENCOUNTER — OFFICE VISIT (OUTPATIENT)
Dept: PHYSICAL THERAPY | Facility: MEDICAL CENTER | Age: 26
End: 2021-08-24
Payer: COMMERCIAL

## 2021-08-24 DIAGNOSIS — M25.561 ACUTE PAIN OF RIGHT KNEE: Primary | ICD-10-CM

## 2021-08-24 PROCEDURE — 97110 THERAPEUTIC EXERCISES: CPT | Performed by: PHYSICAL THERAPIST

## 2021-08-24 PROCEDURE — 97140 MANUAL THERAPY 1/> REGIONS: CPT | Performed by: PHYSICAL THERAPIST

## 2021-08-24 NOTE — PROGRESS NOTES
Daily Note     Today's date: 2021  Patient name: Ishmael Velarde  : 1995  MRN: [id number]  Referring provider: Coleen Abdalla, *  Dx:   Encounter Diagnosis     ICD-10-CM    1  Acute pain of right knee  M25 561                   Subjective: Patient reports that her knee felt better for 1 day after the laser last session, but the pain returned when she returned to the gym  Objective: See treatment diary below      Assessment: Continued with laser therapy to R knee due to patient report of decreased pain after laser at prior sessions  Very minimal erythema was noted on R medial knee after laser that completely resolved within 1 minute  No erythema present at end of session  Will continue to monitor  Cueing provided to maintain knee EXT during 3-way SLR  Patient demonstrated minor R knee pain when performing QS + SLR  Added QS to HEP with towel roll modification to improve quad strength within a pain-free range  Patient would benefit from continued PT to address impairments to maximize function  Plan: Continue per plan of care        Precautions: none      Manuals           Laser R knee JE JE KP          STM med retinaculum distal quads  JE KP                                    Neuro Re-Ed                                                                                                        Ther Ex             gastroc stretch 30"x2 30"x2 30"x2          HS stretch 30"x2 30"x2 30"x2          Piriformis mod stretch 30"x2 30"x2 30"x2          bridges  15 15          SLR x 3  15 15          QS   5"x20 towel roll                                    Ther Activity                                       Gait Training                                       Modalities             High level laser therapy to R medial knee/distal quad 9W   5'

## 2021-08-31 ENCOUNTER — OFFICE VISIT (OUTPATIENT)
Dept: PHYSICAL THERAPY | Facility: MEDICAL CENTER | Age: 26
End: 2021-08-31
Payer: COMMERCIAL

## 2021-08-31 DIAGNOSIS — M25.561 ACUTE PAIN OF RIGHT KNEE: Primary | ICD-10-CM

## 2021-08-31 PROCEDURE — 97110 THERAPEUTIC EXERCISES: CPT | Performed by: PHYSICAL THERAPIST

## 2021-08-31 PROCEDURE — 97140 MANUAL THERAPY 1/> REGIONS: CPT | Performed by: PHYSICAL THERAPIST

## 2021-08-31 NOTE — PROGRESS NOTES
Discharge Summary    Today's date: 2021  Patient name: Chery Singh  : 1995  MRN: [id number]  Referring provider: Minerva Palma, *  Dx:   Encounter Diagnosis     ICD-10-CM    1  Acute pain of right knee  [id number]                   Subjective: Patient reports that her knee has been feeling pretty good since last session  She notes that the laser and massage have made a positive difference  She is able to stair climb at home and do the leg press at the gym  She reports that she has been squatting at a lighter weight  She reports that she is comfortable with her exercises and is agreeable to be discharged to her HEP for continued strengthening  Objective: See treatment diary below      Assessment: Continued with laser therapy and STM to R distal quads due to positive response after last session  Patient demonstrated very mild erythema on R medial knee joint after laser that resolved after 30 seconds  No erythema present at end of session  Patient has demonstrated steady progress with improving R knee and hip girdle strength and flexibility since initial visit  Patient has met all of her goals for PT except for being able to squat to her PLOF  However, she is able to squat at a lighter resistance and is independent in a comprehensive illustrated HEP for continued muscular flexibility and quad/hip girdle strength  Patient has reached maximum benefit from PT services and is now discharged to her HEP       Goals  Impairment Goals  - Decrease pain to 0-3/10- met  - Improve flexibility equal to contralateral side- met   - Increase strength equal to contralateral side- met    Functional Goals  - Patient will be independent with comprehensive HEP- met  - Ambulation is improved to prior level of function- met  - Stair climbing is improved to prior level of function- met  - Squatting is improved to prior level of function- not met (improved but continues to squat at a reduced weight)    Plan: Discharge to HEP  Patient will receive PT script from PCP in the future if symptoms worsen        Precautions: none      Manuals 8/2 8/9 8/24 8/31         Laser R knee ROSSI [facility]         STM med retinaculum distal quads  [facility]                                   Neuro Re-Ed                                                                                                        Ther Ex             gastroc stretch 30"x2 30"x2 30"x2 30"x2         HS stretch 30"x2 30"x2 30"x2 30"x2         Piriformis mod stretch 30"x2 30"x2 30"x2 30"x2         bridges  15 15 20         SLR x 3  15 15 20         QS   5"x20 towel roll 5"x30                                    Ther Activity                                       Gait Training                                       Modalities             High level laser therapy to R medial knee/distal quad 9W   5' 5' 6W

## 2021-09-13 NOTE — PATIENT INSTRUCTIONS
Loop Electrosurgical Excision Procedure   WHAT YOU NEED TO KNOW:   A loop electrosurgical excision procedure (LEEP) is used to remove abnormal tissue from your cervix or vagina  Your cervix is the opening of your uterus  Your healthcare provider will use a small wire loop that is heated by an electrical current to remove the tissue  HOW TO PREPARE:   The week before your procedure:   · Write down the correct date, time, and location of your procedure  · Arrange a ride home  Ask a family member or friend to drive you home after your surgery or procedure  Do not drive yourself home  · Ask your healthcare provider how many days before the procedure you need to stop having sex  · Ask your caregiver if you need to stop using aspirin or any other prescribed or over-the-counter medicine before your procedure or surgery  · Bring your medicine bottles or a list of your medicines when you see your caregiver  Tell your caregiver if you are allergic to any medicine  Tell your caregiver if you use any herbs, food supplements, or over-the-counter medicine  · Tell your caregiver if you know or think you might be pregnant  · Dye may be used during your procedure to mahin abnormal tissues in your vagina and cervix  Tell your healthcare provider if you have ever had an allergic reaction to dye  · You may need to have a pregnancy, urine, or blood test  Talk to your healthcare provider about these or other tests you may need  Write down the date, time, and location for each test   The day of your procedure:   · Ask your caregiver before taking any medicine on the day of your procedure  These medicines include insulin, diabetic pills, high blood pressure pills, or heart pills  Bring a list of all the medicines you take, or your pill bottles, with you to the hospital     · You or a close family member will be asked to sign a legal document called a consent form   It gives caregivers permission to do the procedure or surgery  It also explains the problems that may happen, and your choices  Make sure all your questions are answered before you sign this form  · Caregivers may insert an intravenous tube (IV) into your vein  A vein in the arm is usually chosen  Through the IV tube, you may be given liquids and medicine  · An anesthesiologist will talk to you before your surgery  You may need medicine to keep you asleep or numb an area of your body during surgery  Tell caregivers if you or anyone in your family has had a problem with anesthesia in the past   WHAT WILL HAPPEN:   What will happen:   · Your healthcare provider will ask you to urinate before the procedure  He will ask you to lie on a table and place your legs in stirrups  Your healthcare provider will insert a speculum into your vagina to widen and hold open your vagina so he can see your cervix  He will also place a scope at the opening of your vagina to help find abnormal tissue  He will also swab the tissue with acetic acid (vinegar) or iodine dye to help him see the abnormal tissue better  · Your healthcare provider will inject your cervix or vagina with medicine to numb the area  He will use forceps to hold the cervix steady during the procedure  He will insert a wire loop through your vagina to remove abnormal tissue and stop any bleeding  Tissue samples will be collected and sent to a lab for testing  He will wash the area with iodine or a saline (salt water) solution and apply medicine to decrease bleeding  You will then be given a sanitary pad to wear  After your procedure:  Healthcare providers will monitor you closely for any problems  Do not get out of bed until your healthcare provider says it is okay  When your healthcare provider sees that you are okay, you will be able to go home or be taken to your hospital room  CONTACT YOUR HEALTHCARE PROVIDER IF:   · You cannot make it to your procedure  · You have a fever       · You get a cold or the flu  · You have questions or concerns about your procedure  SEEK CARE IMMEDIATELY IF:   · You have blood, pus, or a foul odor coming out of your vagina  · You have sudden severe abdominal or vaginal pain  RISKS:   · During the procedure, the wire loop may burn or tear part of your vagina  You may have bleeding or an infection after the procedure  · Without the procedure, you may not know what is causing your medical condition  Your symptoms may become worse, and it may lead to cancer  CARE AGREEMENT:   You have the right to help plan your care  Learn about your health condition and how it may be treated  Discuss treatment options with your caregivers to decide what care you want to receive  You always have the right to refuse treatment  © 2016 [employer] is for End User's use only and may not be sold, redistributed or otherwise used for commercial purposes  All illustrations and images included in CareNotes® are the copyrighted property of [employer]  or [employer]  The above information is an  only  It is not intended as medical advice for individual conditions or treatments  Talk to your doctor, nurse or pharmacist before following any medical regimen to see if it is safe and effective for you

## 2021-09-13 NOTE — PROGRESS NOTES
Assessment/Plan:     Diagnoses and all orders for this visit:    Dysplasia of cervix, high grade PIERO 2    ASCUS with positive high risk HPV cervical    Frequency of urination  -     Urinalysis with microscopic  -     Urine culture; Future  -     POCT urine dip  -     Urine culture    Vaginal itching  -     POCT wet mount    Yeast vaginitis  -     fluconazole (DIFLUCAN) 150 mg tablet; Take 1 tablet (150 mg total) by mouth once for 1 dose          PIERO 2  Discussed with patient pathology results in detail  Excision procedure using loop electrosurgical excision procedure of LEEP was discussed with patient and described as excision of the transformation zone using a loop electrosurgical device  Discussed with patient operative technique in detail an indication to treat moderate to severe dysplasia  Discussed with patient complications including intraoperative bleeding, postoperative bleeding, infection  Discussed with patient late complications  including cervical stenosis as well as cervical insufficiency  Rates of cervical stenosis very anywhere from 0-27%  Discussed with patient that cervical stenosis can cause hematometra as well as inability to examine the transformation zone as well as failure to dilate normally during labor  Discussed with patient that treatment of PIERO does not appear to impair fertility  Previous cervical conization is associated with an increased risk of 2nd trimester pregnancy loss, PPROM on  delivery  Discussed with the patient that there are factors that determined these risks including depth of excision, number procedures, short interval from treatment pregnancy, multiple gestation  Discussed appropriate follow-up during subsequent pregnant including cervical length surveillance with US  Discussed with patient indication, risks, benefits and alternatives of surgical exploration   We discussed possibility of bleeding requiring blood transfusion, life-threatening infections requiring additional procedures, injuries to surrounding organs such as bladder, ureters, gastrointestinal tract and/or neurovascular structures  Additionally, we discussed other general risks associated to stress of surgery including but not limited to venous thromboembolism, acute myocardial events and stroke  Patient understands exam under anesthesia will be performed as part of this procedure and that my associates, including trainees may participate/perform exam and/or portions of the procedure as deemed safe and appropriate  All questions answered to patient's satisfaction  She agrees and wants to proceed  Informed consent form signed    Preop instructions and chlorhexidine wash given  ACOG pamphlet on LEEP given  Follow-up 2 weeks postop  Candida Vaginitis      Candida vaginitis diagnosed on exam today based on symptoms and clinical findings  Discussed with patient diagnosis in detail including risk factors, signs and symptoms  Treatment was indicated for relief of symptoms and was prescribed antibiotics  Discussed with patient vulvar hygiene and avoidance of intercourse during treatment  Antibiotics were prescribed and patient advised to complete course of antibiotics  She was asked to call if with untoward side effects and if no relief of symptoms after treatment  Follow-up is unnecessary if symptoms resolve  Fluconazole x 1 given  Await urine culture      Subjective   Patient ID: Aquiles Cerda is a 23 y o  female  Patient is here for a follow-up  Chief Complaint   Patient presents with   Priya Aleman Consult     Surgical discussion for LEEP  Patient with c/o increased urination and slight itching for a few weeks - denies any pain, burning  Patient with history of abnormal Pap smear  Patient with history of PIERO 1 last year  Follow-up Pap smear this year showed atypical cells high risk HPV      Repeat colposcopy shows PIERO 2, discussed observation vs excision, pt requests excision due to h/o long standing h/o abnormal PAPs  She is c/o vaginal itching an urinary frequency and urgency  She has no pain with urination, no fevers  She is not currently sexually active  She has some mild odor but no discharge  Menstrual History:  OB History        0    Para   0    Term   0       0    AB   0    Living   0       SAB   0    TAB   0    Ectopic   0    Multiple   0    Live Births   0                Menarche age: 16  No LMP recorded  Past Medical History:   Diagnosis Date    Anxiety     Asthma     Depression     Disease of thyroid gland     Gastric paresis     Migraines        Social History     Tobacco Use    Smoking status: Never Smoker    Smokeless tobacco: Never Used   Vaping Use    Vaping Use: Never used   Substance Use Topics    Alcohol use: Yes     Comment: rare     Drug use: No       Allergies   Allergen Reactions    Compazine [Prochlorperazine]     Penicillins Hives         Current Outpatient Medications:     cetirizine (ZyrTEC) 10 mg tablet, Take 10 mg by mouth daily, Disp: , Rfl:     Drospiren-Eth Estrad-Levomefol 3-0 02-0 451 MG TABS, Take 1 tablet by mouth daily, Disp: 84 tablet, Rfl: 4    escitalopram (LEXAPRO) 10 mg tablet, Take 1 tablet (10 mg total) by mouth daily, Disp: 90 tablet, Rfl: 1    levothyroxine 25 mcg tablet, Take 1 tablet (25 mcg total) by mouth daily, Disp: 90 tablet, Rfl: 1    fluconazole (DIFLUCAN) 150 mg tablet, Take 1 tablet (150 mg total) by mouth once for 1 dose, Disp: 1 tablet, Rfl: 0      Review of Systems   Constitutional: Negative  HENT: Negative  Eyes: Negative  Respiratory: Negative  Cardiovascular: Negative  Gastrointestinal: Negative  Endocrine: Negative  Genitourinary:        As noted in HPI   Musculoskeletal: Negative  Skin: Negative  Allergic/Immunologic: Negative  Neurological: Negative  Hematological: Negative  Psychiatric/Behavioral: Negative  /58 (BP Location: Right arm, Patient Position: Sitting, Cuff Size: Adult)   Pulse 83   Temp 97 8 °F (36 6 °C) (Temporal)   Ht 5' (1 524 m)   Wt 56 2 kg (124 lb)   BMI 24 22 kg/m²       Physical Exam  Constitutional:       General: She is not in acute distress  Appearance: She is well-developed  Genitourinary:      Pelvic exam was performed with patient in the lithotomy position  Inguinal canal, urethra, bladder, cervix, uterus, right adnexa and left adnexa normal       No vulval lesion, tenderness, ulcerations, Bartholin's cyst or rash noted  No signs of labial injury  No posterior fourchette tenderness, injury, rash or lesion present  No signs of injury or lesions in the vagina  No vaginal discharge, erythema, tenderness, bleeding, atrophy or prolapse  No cervical polyp  Uterus is not enlarged or tender  No uterine mass detected  Uterus is regular  No right or left adnexal mass present  Right adnexa not tender or full  Left adnexa not tender or full  Neck:      Thyroid: No thyromegaly  Cardiovascular:      Rate and Rhythm: Normal rate and regular rhythm  Heart sounds: Normal heart sounds  Pulmonary:      Effort: Pulmonary effort is normal       Breath sounds: Normal breath sounds  Abdominal:      General: There is no distension  Palpations: Abdomen is soft  Tenderness: There is no abdominal tenderness  Musculoskeletal:         General: No tenderness  Neurological:      Mental Status: She is alert and oriented to person, place, and time  Skin:     General: Skin is warm and dry  Psychiatric:         Behavior: Behavior normal              Tissue Exam: [id number]  Order: [id number]  Collected:  8/11/2021 12:52 PM Status:  Final result   Visible to patient:  Yes ([address]) Dx:  ASCUS with positive high risk HPV cer      1 Result Note     1 Follow-up Encounter  Component    Case Report   Surgical Pathology Report                         Case: [id number]                                    Authorizing Provider: Mendy Karimi MD           Collected:           08/11/2021 1252               Ordering Location:     [facility]  Received:            08/11/2021 1252                                      [facility]                                                                  Pathologist:           Mildred Burciaga MD                                                           Specimens:   A) - Endocervical, ecc                                                                               B) - Cervical Cuff, 4 o'clock                                                                        C) - Cervical Cuff, 6 o'clock                                                                        D) - Cervical Cuff, 12 o'clock                                                             Final Diagnosis   A  Endocervical, ecc:        - No endocervical glands are identified; the specimen did not survive processing      B  Cervical Cuff, 4 o'clock:        - Reactive endocervical glands  - No dysplasia or malignancy is identified      C  Cervical Cuff, 6 o'clock:         - Focal PIERO 2 (HGSIL) arising on a background of PIERO I (LGSIL), involving the transformation zone and possibly involving squamous metaplastic endocervical glands  - The diagnosis is supported by positive staining for p16 and increased staining for Ki-67                   D  Cervical Cuff, 12 o'clock:        - Squamous mucosa with reactive features            - No endocervical glands are identified           - No dysplasia or malignancy is identified      Interpretation performed at [address]     Electronically signed by Mildred Burciaga MD on 8/17/2021 at 12:01 PM   Additional Information    All reported additional testing was performed with appropriately reactive controls   These tests were developed and their performance characteristics determined by [facility] or appropriate performing facility, though some tests may be performed on tissues which have not been validated for performance characteristics (such as staining performed on alcohol exposed cell blocks and decalcified tissues)   Results should be interpreted with caution and in the context of the patients clinical condition  These tests may not be cleared or approved by the [place]  Food and Drug Administration, though the FDA has determined that such clearance or approval is not necessary  These tests are used for clinical purposes and they should not be regarded as investigational or for research  This laboratory has been approved by CLIA 88, designated as a high-complexity laboratory and is qualified to perform these tests  Paras Agarwal Description        A  The specimen is received in formalin, labeled with the patient's name and hospital number, and is designated "  endocervical curetting"  The specimen consists of multiple colorless flat friable irregularly-shaped soft tissue fragments measuring in aggregate of 0 3 x 0 2 by less than 0 1 cm  The specimen is drained into an embedding bag  Entirely submitted  One cassette  Note: due to the size and consistency of specimen, the tissue may not survive histologic processing           B  The specimen is received in formalin, labeled with the patient's name and hospital number, and is designated "  cervical cuff 4 o'clock"  The specimen consists of 2 mucinous friable irregularly-shaped soft tissue fragments measuring 0 4 and 0 6 cm in greatest dimension  The specimen is entirely submitted in a screened cassette          C  The specimen is received in formalin, labeled with the patient's name and hospital number, and is designated "   Cervical cuff, 6  O'clock"  The specimen consists of a single tan soft tissue fragment measuring 0 5 x 0 5 x 0 2 cm    The specimen is entirely submitted in a screened cassette            D  The specimen is received in formalin, labeled with the patient's name and hospital number, and is designated " cervical cuff 12 o'clock"  The specimen consists of multiple colorless- tan irregularly-shaped, friable soft tissue fragments measuring in aggregate of 0 8 x 0 4 x 0 4 cm  The specimen is entirely submitted in a screened cassette      Note: The estimated total formalin fixation time based upon information provided by the submitting clinician and the standard processing schedule is under 72 hours      [facility]  WWW  [facility]/LABS  [phone])  This result report is intended for :  ________________________________  ________________________________  ___________________________________________________________________________________________________________  Authorizing Provider  Janiya Bang MD O: [phone] F: [phone]  [facility] [address] [place] [address]  Gynecologic Cytology Report (Final result) [id number]  Authorizing Provider: Janiya Bang MD Ordering Provider:  Ordering Location: [facility]  Collected: 07/27/2021 1355  First Screen: Lorna Sine, CT Received: 07/27/2021 1355  Pathologist: Alicia Rodriguez MD     Specimens  A LIQUID-BASED PAP, DIAGNOSTIC, Cervix    Primary Interpretation  Epithelial cell abnormality  Electronically signed by Alicia Rodriguez MD on 8/4/2021 at [phone]    Interpretation  Atypical squamous cells of undetermined significance  Fungal organisms morphologically consistent with Candida spp    Specimen Adequacy  Satisfactory for evaluation  Absence of endocervical/transformation zone component       Additional Information  [employer]'s FDA approved ,  and ThinPrep Imaging Duo System are utilized with strict adherence to the 's instruction  manual to prepare gynecologic and non-gynecologic cytology specimens for the production of ThinPrep slides as well as for gynecologic  ThinPrep imaging  These processes have been validated by our laboratory and/or by the   Tiffany Johnson  [phone]  Page: 1 of 3 Printed: 8/4/2021 10:41 AM  The Pap test is not a diagnostic procedure and should not be used as the sole means to detect cervical cancer  It is only a screening  procedure to aid in the detection of cervical cancer and its precursors  Both false-negative and false-positive results have been experienced  Your patient's test result should be interpreted in this context together with the history and clinical findings  Interpretation performed at [facility], [facility] [facility]         MOLECULAR DIAGNOSTICS  07/27/21 1355 R1 Ref  Range  N gonorrhoeae, DNA Probe Negative Negative  Chlamydia trachomatis, DNA Probe Negative Negative  Lab [facility]  R1 Test performed using PCR amplification of target DNA  This test is intended as an aid in the diagnosis of Chlamydial and  gonococcal disease  This test has not been evaluated in patients younger than 15years of age and is not recommended for  evaluation of suspected sexual abuse  Additional testing is recommended when the results do not correlate with clinical  signs and symptoms  Neva Farooq HPV HIGH RISK (Final result)  Component Value Ref  Range  HPV Other HR Positive (A) Negative  Specimen is positive for the DNA of any one of, or combination of the following high risk HPV types:  70,42,62,34,59,84,90,77,34,16,27,34   HPV16 Negative Negative  HPV18 Negative Negative  Resulting Lab: [facility]  Comments:  Roches FDA approved Mariam 4800 is utilized with strict adherence to the s instruction  manual to test for the presence of High-Risk HPV DNA, as well as HPV 16 and HPV 18  This instrument  has been validated by our laboratory and/or by the     Negative results indicate HPV DNA concentrations are undetectable or below the pre-set threshold  A negative result does not preclude the presence of HPV infection because results depend on adequate  specimen collection, absence of inhibitors and sufficient DNA to be detected  Additionally, HPV negative  results are not intended to prevent women from proceeding to colposcopy if clinically warranted  Positive HPV test results indicate the presence of any one or more of the high risk types, but since patients  are often co-infected with low-risk types it does not rule out the presence of low-risk types in patients  with mixed infections     Genital specimen [id number] from [facility]ervix Collection  Ordered by Unspecified  Authorized by Nancy Strauss MD  Collected:  7/27/2021 1355 Received: 7/28/2021 1132  Verified: 7/28/2021 2327  Resulted by [facility]    Related Orders  [id number]  Bhupinder Rose  [id number]  Page: 2 of 3 Printed: 8/4/2021 10:41 AM  Legend  A - Abnormal  Resulting Labs  [phone]  REPORT END  [facility] BE [address],  [address] [place] [address]  Director: Marci Tucker MD-Lab Medical Director  [id number]  Bhupinder Rose  [id number]  Page: 3 of 3 Printed: 8/4/2021 10:41 AM

## 2021-09-14 ENCOUNTER — CONSULT (OUTPATIENT)
Dept: OBGYN CLINIC | Facility: CLINIC | Age: 26
End: 2021-09-14
Payer: COMMERCIAL

## 2021-09-14 VITALS
HEIGHT: 60 IN | DIASTOLIC BLOOD PRESSURE: 58 MMHG | HEART RATE: 83 BPM | TEMPERATURE: 97.8 F | WEIGHT: 124 LBS | SYSTOLIC BLOOD PRESSURE: 100 MMHG | BODY MASS INDEX: 24.35 KG/M2

## 2021-09-14 DIAGNOSIS — N89.8 VAGINAL ITCHING: ICD-10-CM

## 2021-09-14 DIAGNOSIS — N87.1 DYSPLASIA OF CERVIX, HIGH GRADE CIN 2: Primary | ICD-10-CM

## 2021-09-14 DIAGNOSIS — B37.3 YEAST VAGINITIS: ICD-10-CM

## 2021-09-14 DIAGNOSIS — R35.0 FREQUENCY OF URINATION: ICD-10-CM

## 2021-09-14 DIAGNOSIS — R87.610 ASCUS WITH POSITIVE HIGH RISK HPV CERVICAL: ICD-10-CM

## 2021-09-14 DIAGNOSIS — R87.810 ASCUS WITH POSITIVE HIGH RISK HPV CERVICAL: ICD-10-CM

## 2021-09-14 LAB
BACTERIA UR QL AUTO: NORMAL /HPF
BILIRUB UR QL STRIP: NEGATIVE
BV WHIFF TEST VAG QL: 0
CLARITY UR: CLEAR
CLUE CELLS SPEC QL WET PREP: 0
COLOR UR: YELLOW
GLUCOSE UR STRIP-MCNC: NEGATIVE MG/DL
HGB UR QL STRIP.AUTO: NEGATIVE
HYALINE CASTS #/AREA URNS LPF: NORMAL /LPF
KETONES UR STRIP-MCNC: NEGATIVE MG/DL
LEUKOCYTE ESTERASE UR QL STRIP: NEGATIVE
NITRITE UR QL STRIP: NEGATIVE
NON-SQ EPI CELLS URNS QL MICRO: NORMAL /HPF
PH SMN: 4 [PH]
PH UR STRIP.AUTO: 7 [PH]
PROT UR STRIP-MCNC: NEGATIVE MG/DL
RBC #/AREA URNS AUTO: NORMAL /HPF
SL AMB  POCT GLUCOSE, UA: NORMAL
SL AMB LEUKOCYTE ESTERASE,UA: NORMAL
SL AMB POCT BILIRUBIN,UA: NORMAL
SL AMB POCT BLOOD,UA: NORMAL
SL AMB POCT CLARITY,UA: CLEAR
SL AMB POCT COLOR,UA: NORMAL
SL AMB POCT KETONES,UA: NORMAL
SL AMB POCT NITRITE,UA: NORMAL
SL AMB POCT PH,UA: 6.5
SL AMB POCT SPECIFIC GRAVITY,UA: 1
SL AMB POCT URINE PROTEIN: NORMAL
SL AMB POCT UROBILINOGEN: 0.2
SP GR UR STRIP.AUTO: 1.01 (ref 1–1.03)
T VAGINALIS VAG QL WET PREP: 0
UROBILINOGEN UR QL STRIP.AUTO: 0.2 E.U./DL
WBC #/AREA URNS AUTO: NORMAL /HPF
YEAST VAG QL WET PREP: PRESENT

## 2021-09-14 PROCEDURE — 87086 URINE CULTURE/COLONY COUNT: CPT | Performed by: OBSTETRICS & GYNECOLOGY

## 2021-09-14 PROCEDURE — 87210 SMEAR WET MOUNT SALINE/INK: CPT | Performed by: OBSTETRICS & GYNECOLOGY

## 2021-09-14 PROCEDURE — 81001 URINALYSIS AUTO W/SCOPE: CPT | Performed by: OBSTETRICS & GYNECOLOGY

## 2021-09-14 PROCEDURE — 3008F BODY MASS INDEX DOCD: CPT | Performed by: OBSTETRICS & GYNECOLOGY

## 2021-09-14 PROCEDURE — 99214 OFFICE O/P EST MOD 30 MIN: CPT | Performed by: OBSTETRICS & GYNECOLOGY

## 2021-09-14 PROCEDURE — 81002 URINALYSIS NONAUTO W/O SCOPE: CPT | Performed by: OBSTETRICS & GYNECOLOGY

## 2021-09-14 PROCEDURE — 1036F TOBACCO NON-USER: CPT | Performed by: OBSTETRICS & GYNECOLOGY

## 2021-09-14 RX ORDER — SODIUM CHLORIDE, SODIUM LACTATE, POTASSIUM CHLORIDE, CALCIUM CHLORIDE 600; 310; 30; 20 MG/100ML; MG/100ML; MG/100ML; MG/100ML
125 INJECTION, SOLUTION INTRAVENOUS CONTINUOUS
Status: CANCELLED | OUTPATIENT
Start: 2021-09-28

## 2021-09-14 RX ORDER — FLUCONAZOLE 150 MG/1
150 TABLET ORAL ONCE
Qty: 1 TABLET | Refills: 0 | Status: SHIPPED | OUTPATIENT
Start: 2021-09-14 | End: 2021-09-14

## 2021-09-14 NOTE — H&P
Assessment/Plan:     Diagnoses and all orders for this visit:    Dysplasia of cervix, high grade PIERO 2    ASCUS with positive high risk HPV cervical    Frequency of urination  -     Urinalysis with microscopic  -     Urine culture; Future  -     POCT urine dip  -     Urine culture    Vaginal itching  -     POCT wet mount    Yeast vaginitis  -     fluconazole (DIFLUCAN) 150 mg tablet; Take 1 tablet (150 mg total) by mouth once for 1 dose          PIERO 2  Discussed with patient pathology results in detail  Excision procedure using loop electrosurgical excision procedure of LEEP was discussed with patient and described as excision of the transformation zone using a loop electrosurgical device  Discussed with patient operative technique in detail an indication to treat moderate to severe dysplasia  Discussed with patient complications including intraoperative bleeding, postoperative bleeding, infection  Discussed with patient late complications  including cervical stenosis as well as cervical insufficiency  Rates of cervical stenosis very anywhere from 0-27%  Discussed with patient that cervical stenosis can cause hematometra as well as inability to examine the transformation zone as well as failure to dilate normally during labor  Discussed with patient that treatment of PIERO does not appear to impair fertility  Previous cervical conization is associated with an increased risk of 2nd trimester pregnancy loss, PPROM on  delivery  Discussed with the patient that there are factors that determined these risks including depth of excision, number procedures, short interval from treatment pregnancy, multiple gestation  Discussed appropriate follow-up during subsequent pregnant including cervical length surveillance with US  Discussed with patient indication, risks, benefits and alternatives of surgical exploration   We discussed possibility of bleeding requiring blood transfusion, life-threatening infections requiring additional procedures, injuries to surrounding organs such as bladder, ureters, gastrointestinal tract and/or neurovascular structures  Additionally, we discussed other general risks associated to stress of surgery including but not limited to venous thromboembolism, acute myocardial events and stroke  Patient understands exam under anesthesia will be performed as part of this procedure and that my associates, including trainees may participate/perform exam and/or portions of the procedure as deemed safe and appropriate  All questions answered to patient's satisfaction  She agrees and wants to proceed  Informed consent form signed    Preop instructions and chlorhexidine wash given  ACOG pamphlet on LEEP given  Follow-up 2 weeks postop  Candida Vaginitis      Candida vaginitis diagnosed on exam today based on symptoms and clinical findings  Discussed with patient diagnosis in detail including risk factors, signs and symptoms  Treatment was indicated for relief of symptoms and was prescribed antibiotics  Discussed with patient vulvar hygiene and avoidance of intercourse during treatment  Antibiotics were prescribed and patient advised to complete course of antibiotics  She was asked to call if with untoward side effects and if no relief of symptoms after treatment  Follow-up is unnecessary if symptoms resolve  Fluconazole x 1 given  Await urine culture      Subjective   Patient ID: Michael Rushing is a 23 y o  female  Patient is here for a follow-up  Chief Complaint   Patient presents with   Maria De Jesus Dawson Consult     Surgical discussion for LEEP  Patient with c/o increased urination and slight itching for a few weeks - denies any pain, burning  Patient with history of abnormal Pap smear  Patient with history of PIERO 1 last year  Follow-up Pap smear this year showed atypical cells high risk HPV      Repeat colposcopy shows PIERO 2, discussed observation vs excision, pt requests excision due to h/o long standing h/o abnormal PAPs  She is c/o vaginal itching an urinary frequency and urgency  She has no pain with urination, no fevers  She is not currently sexually active  She has some mild odor but no discharge  Menstrual History:  OB History        0    Para   0    Term   0       0    AB   0    Living   0       SAB   0    TAB   0    Ectopic   0    Multiple   0    Live Births   0                Menarche age: 16  No LMP recorded  Past Medical History:   Diagnosis Date    Anxiety     Asthma     Depression     Disease of thyroid gland     Gastric paresis     Migraines        Social History     Tobacco Use    Smoking status: Never Smoker    Smokeless tobacco: Never Used   Vaping Use    Vaping Use: Never used   Substance Use Topics    Alcohol use: Yes     Comment: rare     Drug use: No       Allergies   Allergen Reactions    Compazine [Prochlorperazine]     Penicillins Hives         Current Outpatient Medications:     cetirizine (ZyrTEC) 10 mg tablet, Take 10 mg by mouth daily, Disp: , Rfl:     Drospiren-Eth Estrad-Levomefol 3-0 02-0 451 MG TABS, Take 1 tablet by mouth daily, Disp: 84 tablet, Rfl: 4    escitalopram (LEXAPRO) 10 mg tablet, Take 1 tablet (10 mg total) by mouth daily, Disp: 90 tablet, Rfl: 1    levothyroxine 25 mcg tablet, Take 1 tablet (25 mcg total) by mouth daily, Disp: 90 tablet, Rfl: 1    fluconazole (DIFLUCAN) 150 mg tablet, Take 1 tablet (150 mg total) by mouth once for 1 dose, Disp: 1 tablet, Rfl: 0      Review of Systems   Constitutional: Negative  HENT: Negative  Eyes: Negative  Respiratory: Negative  Cardiovascular: Negative  Gastrointestinal: Negative  Endocrine: Negative  Genitourinary:        As noted in HPI   Musculoskeletal: Negative  Skin: Negative  Allergic/Immunologic: Negative  Neurological: Negative  Hematological: Negative  Psychiatric/Behavioral: Negative  /58 (BP Location: Right arm, Patient Position: Sitting, Cuff Size: Adult)   Pulse 83   Temp 97 8 °F (36 6 °C) (Temporal)   Ht 5' (1 524 m)   Wt 56 2 kg (124 lb)   BMI 24 22 kg/m²       Physical Exam  Constitutional:       General: She is not in acute distress  Appearance: She is well-developed  Genitourinary:      Pelvic exam was performed with patient in the lithotomy position  Inguinal canal, urethra, bladder, cervix, uterus, right adnexa and left adnexa normal       No vulval lesion, tenderness, ulcerations, Bartholin's cyst or rash noted  No signs of labial injury  No posterior fourchette tenderness, injury, rash or lesion present  No signs of injury or lesions in the vagina  No vaginal discharge, erythema, tenderness, bleeding, atrophy or prolapse  No cervical polyp  Uterus is not enlarged or tender  No uterine mass detected  Uterus is regular  No right or left adnexal mass present  Right adnexa not tender or full  Left adnexa not tender or full  Neck:      Thyroid: No thyromegaly  Cardiovascular:      Rate and Rhythm: Normal rate and regular rhythm  Heart sounds: Normal heart sounds  Pulmonary:      Effort: Pulmonary effort is normal       Breath sounds: Normal breath sounds  Abdominal:      General: There is no distension  Palpations: Abdomen is soft  Tenderness: There is no abdominal tenderness  Musculoskeletal:         General: No tenderness  Neurological:      Mental Status: She is alert and oriented to person, place, and time  Skin:     General: Skin is warm and dry  Psychiatric:         Behavior: Behavior normal              Tissue Exam: [id number]  Order: [id number]  Collected:  8/11/2021 12:52 PM Status:  Final result   Visible to patient:  Yes ([address]) Dx:  ASCUS with positive high risk HPV cer      1 Result Note     1 Follow-up Encounter  Component    Case Report   Surgical Pathology Report                         Case: [id number]                                    Authorizing Provider: Terry Castellanos MD           Collected:           08/11/2021 1252               Ordering Location:     [facility]  Received:            08/11/2021 1252                                      [facility]                                                                  Pathologist:           Monster Napoles MD                                                           Specimens:   A) - Endocervical, ecc                                                                               B) - Cervical Cuff, 4 o'clock                                                                        C) - Cervical Cuff, 6 o'clock                                                                        D) - Cervical Cuff, 12 o'clock                                                             Final Diagnosis   A  Endocervical, ecc:        - No endocervical glands are identified; the specimen did not survive processing      B  Cervical Cuff, 4 o'clock:        - Reactive endocervical glands  - No dysplasia or malignancy is identified      C  Cervical Cuff, 6 o'clock:         - Focal PIERO 2 (HGSIL) arising on a background of PIERO I (LGSIL), involving the transformation zone and possibly involving squamous metaplastic endocervical glands  - The diagnosis is supported by positive staining for p16 and increased staining for Ki-67                   D  Cervical Cuff, 12 o'clock:        - Squamous mucosa with reactive features            - No endocervical glands are identified           - No dysplasia or malignancy is identified      Interpretation performed at [address]     Electronically signed by Monster Napoles MD on 8/17/2021 at 12:01 PM   Additional Information    All reported additional testing was performed with appropriately reactive controls   These tests were developed and their performance characteristics determined by [address] [place] [address] [facility] or appropriate performing facility, though some tests may be performed on tissues which have not been validated for performance characteristics (such as staining performed on alcohol exposed cell blocks and decalcified tissues)   Results should be interpreted with caution and in the context of the patients clinical condition  These tests may not be cleared or approved by the [place]  Food and Drug Administration, though the FDA has determined that such clearance or approval is not necessary  These tests are used for clinical purposes and they should not be regarded as investigational or for research  This laboratory has been approved by CLIA 88, designated as a high-complexity laboratory and is qualified to perform these tests  Asia Osorio Description        A  The specimen is received in formalin, labeled with the patient's name and hospital number, and is designated "  endocervical curetting"  The specimen consists of multiple colorless flat friable irregularly-shaped soft tissue fragments measuring in aggregate of 0 3 x 0 2 by less than 0 1 cm  The specimen is drained into an embedding bag  Entirely submitted  One cassette  Note: due to the size and consistency of specimen, the tissue may not survive histologic processing           B  The specimen is received in formalin, labeled with the patient's name and hospital number, and is designated "  cervical cuff 4 o'clock"  The specimen consists of 2 mucinous friable irregularly-shaped soft tissue fragments measuring 0 4 and 0 6 cm in greatest dimension  The specimen is entirely submitted in a screened cassette          C  The specimen is received in formalin, labeled with the patient's name and hospital number, and is designated "   Cervical cuff, 6  O'clock"  The specimen consists of a single tan soft tissue fragment measuring 0 5 x 0 5 x 0 2 cm    The specimen is entirely submitted in a screened cassette            D  The specimen is received in formalin, labeled with the patient's name and hospital number, and is designated " cervical cuff 12 o'clock"  The specimen consists of multiple colorless- tan irregularly-shaped, friable soft tissue fragments measuring in aggregate of 0 8 x 0 4 x 0 4 cm  The specimen is entirely submitted in a screened cassette      Note: The estimated total formalin fixation time based upon information provided by the submitting clinician and the standard processing schedule is under 72 hours      [facility]  WWW  [facility]/LABS  [phone])  This result report is intended for :  ________________________________  ________________________________  ___________________________________________________________________________________________________________  Authorizing Provider  Nora Ferrer MD O: [phone] F: [phone]  [facility] [place] [address]  Gynecologic Cytology Report (Final result) [id number]  Authorizing Provider: Nora Ferrer MD Ordering Provider:  Ordering Location: [facility]  [facility]  Collected: 07/27/2021 1355  First Screen: MIHAI Mckay Received: 07/27/2021 1355  Pathologist: Molly Grossman MD     Specimens  A LIQUID-BASED PAP, DIAGNOSTIC, Cervix    Primary Interpretation  Epithelial cell abnormality  Electronically signed by Molly Grossman MD on 8/4/2021 at [phone]    Interpretation  Atypical squamous cells of undetermined significance  Fungal organisms morphologically consistent with Candida spp    Specimen Adequacy  Satisfactory for evaluation  Absence of endocervical/transformation zone component       Additional Information  Hologic's FDA approved ,  and ThinPrep Imaging Duo System are utilized with strict adherence to the 's instruction  manual to prepare gynecologic and non-gynecologic cytology specimens for the production of ThinPrep slides as well as for gynecologic  ThinPrep imaging  These processes have been validated by our laboratory and/or by the   Tiffany Johnson  [phone]  Page: 1 of 3 Printed: 8/4/2021 10:41 AM  The Pap test is not a diagnostic procedure and should not be used as the sole means to detect cervical cancer  It is only a screening  procedure to aid in the detection of cervical cancer and its precursors  Both false-negative and false-positive results have been experienced  Your patient's test result should be interpreted in this context together with the history and clinical findings  Interpretation performed at [facility], [facility] [facility]         MOLECULAR DIAGNOSTICS  07/27/21 1355 R1 Ref  Range  N gonorrhoeae, DNA Probe Negative Negative  Chlamydia trachomatis, DNA Probe Negative Negative  Lab [facility] LAB  R1 Test performed using PCR amplification of target DNA  This test is intended as an aid in the diagnosis of Chlamydial and  gonococcal disease  This test has not been evaluated in patients younger than 15years of age and is not recommended for  evaluation of suspected sexual abuse  Additional testing is recommended when the results do not correlate with clinical  signs and symptoms  Neva Farooq HPV HIGH RISK (Final result)  Component Value Ref  Range  HPV Other HR Positive (A) Negative  Specimen is positive for the DNA of any one of, or combination of the following high risk HPV types:  34,34,59,41,26,61,68,59,83,92,90,65   HPV16 Negative Negative  HPV18 Negative Negative  Resulting Lab: [facility]  Comments:  Roches FDA approved Mariam 4800 is utilized with strict adherence to the s instruction  manual to test for the presence of High-Risk HPV DNA, as well as HPV 16 and HPV 18  This instrument  has been validated by our laboratory and/or by the     Negative results indicate HPV DNA concentrations are undetectable or below the pre-set threshold  A negative result does not preclude the presence of HPV infection because results depend on adequate  specimen collection, absence of inhibitors and sufficient DNA to be detected  Additionally, HPV negative  results are not intended to prevent women from proceeding to colposcopy if clinically warranted  Positive HPV test results indicate the presence of any one or more of the high risk types, but since patients  are often co-infected with low-risk types it does not rule out the presence of low-risk types in patients  with mixed infections     Genital specimen [id number] from [facility]  Ordered by Unspecified  Authorized by Radha Dela Cruz MD  Collected:  7/27/2021 1355 Received: 7/28/2021 1132  Verified: 7/28/2021 2327  Resulted by [facility] LAB    Related Orders  [id number]  [employer]  [phone]  Page: 2 of 3 Printed: 8/4/2021 10:41 AM  Legend  A - Abnormal  Resulting Labs  [phone]  REPORT END  BE  LAB [facility] [address]  Director: Parul Mejia MD-Lab Medical Director  [id number]  [employer]  [phone]  Page: 3 of 3 Printed: 8/4/2021 10:41 AM

## 2021-09-15 LAB — BACTERIA UR CULT: NORMAL

## 2021-09-21 ENCOUNTER — TELEPHONE (OUTPATIENT)
Dept: OBGYN CLINIC | Facility: CLINIC | Age: 26
End: 2021-09-21

## 2021-09-21 NOTE — TELEPHONE ENCOUNTER
Spoke to [place] at [employer]   No prior authorization is required for cpt code 39109  Ref#[id number]

## 2021-09-23 ENCOUNTER — ANESTHESIA EVENT (OUTPATIENT)
Dept: PERIOP | Facility: HOSPITAL | Age: 26
End: 2021-09-23
Payer: COMMERCIAL

## 2021-09-23 DIAGNOSIS — E03.9 HYPOTHYROIDISM, UNSPECIFIED TYPE: ICD-10-CM

## 2021-09-23 NOTE — TELEPHONE ENCOUNTER
PT WILL BE RUNNING OUT OF HER MEDS levothyroxine 25 mcg tablet) (AND STILL HAS NOT RECEIVED ANYTHING FOR HER MAIL ORDER  SHE IS GOING TO NEED THAT CALLED INTO THEM AGAIN AND SHE IS ASKING FOR A SHORT SCRIPT THAT SHE CAN  THIS WEEK AT [address]    ANY QUESTIONS CALL PT [phone]

## 2021-09-24 RX ORDER — LEVOTHYROXINE SODIUM 0.03 MG/1
25 TABLET ORAL DAILY
Qty: 90 TABLET | Refills: 1 | Status: SHIPPED | OUTPATIENT
Start: 2021-09-24 | End: 2021-12-21 | Stop reason: SDUPTHER

## 2021-09-28 ENCOUNTER — ANESTHESIA (OUTPATIENT)
Dept: PERIOP | Facility: HOSPITAL | Age: 26
End: 2021-09-28
Payer: COMMERCIAL

## 2021-09-28 ENCOUNTER — HOSPITAL ENCOUNTER (OUTPATIENT)
Facility: HOSPITAL | Age: 26
Setting detail: OUTPATIENT SURGERY
Discharge: HOME | End: 2021-09-28
Attending: OBSTETRICS & GYNECOLOGY | Admitting: OBSTETRICS & GYNECOLOGY
Payer: COMMERCIAL

## 2021-09-28 VITALS
HEART RATE: 94 BPM | RESPIRATION RATE: 16 BRPM | TEMPERATURE: 97.6 F | DIASTOLIC BLOOD PRESSURE: 51 MMHG | OXYGEN SATURATION: 95 % | BODY MASS INDEX: 23.56 KG/M2 | SYSTOLIC BLOOD PRESSURE: 99 MMHG | HEIGHT: 60 IN | WEIGHT: 120 LBS

## 2021-09-28 DIAGNOSIS — R87.810 ASCUS WITH POSITIVE HIGH RISK HPV CERVICAL: ICD-10-CM

## 2021-09-28 DIAGNOSIS — R87.610 ASCUS WITH POSITIVE HIGH RISK HPV CERVICAL: ICD-10-CM

## 2021-09-28 DIAGNOSIS — N87.1 DYSPLASIA OF CERVIX, HIGH GRADE CIN 2: ICD-10-CM

## 2021-09-28 PROBLEM — Z98.890 S/P LEEP: Status: ACTIVE | Noted: 2021-09-28

## 2021-09-28 LAB
EXT PREGNANCY TEST URINE: NEGATIVE
EXT. CONTROL: NORMAL

## 2021-09-28 PROCEDURE — 88307 TISSUE EXAM BY PATHOLOGIST: CPT | Performed by: PATHOLOGY

## 2021-09-28 PROCEDURE — 81025 URINE PREGNANCY TEST: CPT | Performed by: OBSTETRICS & GYNECOLOGY

## 2021-09-28 PROCEDURE — 57522 CONIZATION OF CERVIX: CPT | Performed by: OBSTETRICS & GYNECOLOGY

## 2021-09-28 RX ORDER — ONDANSETRON 2 MG/ML
INJECTION INTRAMUSCULAR; INTRAVENOUS AS NEEDED
Status: DISCONTINUED | OUTPATIENT
Start: 2021-09-28 | End: 2021-09-28

## 2021-09-28 RX ORDER — IBUPROFEN 600 MG/1
600 TABLET ORAL EVERY 6 HOURS PRN
Status: CANCELLED | OUTPATIENT
Start: 2021-09-28

## 2021-09-28 RX ORDER — FENTANYL CITRATE 50 UG/ML
INJECTION, SOLUTION INTRAMUSCULAR; INTRAVENOUS AS NEEDED
Status: DISCONTINUED | OUTPATIENT
Start: 2021-09-28 | End: 2021-09-28

## 2021-09-28 RX ORDER — MIDAZOLAM HYDROCHLORIDE 2 MG/2ML
INJECTION, SOLUTION INTRAMUSCULAR; INTRAVENOUS AS NEEDED
Status: DISCONTINUED | OUTPATIENT
Start: 2021-09-28 | End: 2021-09-28

## 2021-09-28 RX ORDER — LIDOCAINE HYDROCHLORIDE AND EPINEPHRINE 10; 10 MG/ML; UG/ML
INJECTION, SOLUTION INFILTRATION; PERINEURAL AS NEEDED
Status: DISCONTINUED | OUTPATIENT
Start: 2021-09-28 | End: 2021-09-28 | Stop reason: HOSPADM

## 2021-09-28 RX ORDER — IODINE SOLUTION STRONG 5% (LUGOL'S) 5 %
SOLUTION ORAL AS NEEDED
Status: DISCONTINUED | OUTPATIENT
Start: 2021-09-28 | End: 2021-09-28 | Stop reason: HOSPADM

## 2021-09-28 RX ORDER — LIDOCAINE HYDROCHLORIDE 20 MG/ML
INJECTION, SOLUTION EPIDURAL; INFILTRATION; INTRACAUDAL; PERINEURAL AS NEEDED
Status: DISCONTINUED | OUTPATIENT
Start: 2021-09-28 | End: 2021-09-28

## 2021-09-28 RX ORDER — FENTANYL CITRATE/PF 50 MCG/ML
25 SYRINGE (ML) INJECTION
Status: DISCONTINUED | OUTPATIENT
Start: 2021-09-28 | End: 2021-09-28 | Stop reason: HOSPADM

## 2021-09-28 RX ORDER — PROPOFOL 10 MG/ML
INJECTION, EMULSION INTRAVENOUS AS NEEDED
Status: DISCONTINUED | OUTPATIENT
Start: 2021-09-28 | End: 2021-09-28

## 2021-09-28 RX ORDER — EPHEDRINE SULFATE 50 MG/ML
INJECTION INTRAVENOUS AS NEEDED
Status: DISCONTINUED | OUTPATIENT
Start: 2021-09-28 | End: 2021-09-28

## 2021-09-28 RX ORDER — DEXAMETHASONE SODIUM PHOSPHATE 4 MG/ML
INJECTION, SOLUTION INTRA-ARTICULAR; INTRALESIONAL; INTRAMUSCULAR; INTRAVENOUS; SOFT TISSUE AS NEEDED
Status: DISCONTINUED | OUTPATIENT
Start: 2021-09-28 | End: 2021-09-28

## 2021-09-28 RX ORDER — ONDANSETRON 2 MG/ML
4 INJECTION INTRAMUSCULAR; INTRAVENOUS EVERY 6 HOURS PRN
Status: CANCELLED | OUTPATIENT
Start: 2021-09-28

## 2021-09-28 RX ORDER — ONDANSETRON 2 MG/ML
4 INJECTION INTRAMUSCULAR; INTRAVENOUS ONCE AS NEEDED
Status: COMPLETED | OUTPATIENT
Start: 2021-09-28 | End: 2021-09-28

## 2021-09-28 RX ORDER — ACETAMINOPHEN 325 MG/1
650 TABLET ORAL EVERY 6 HOURS PRN
Status: CANCELLED | OUTPATIENT
Start: 2021-09-28

## 2021-09-28 RX ORDER — KETOROLAC TROMETHAMINE 30 MG/ML
INJECTION, SOLUTION INTRAMUSCULAR; INTRAVENOUS AS NEEDED
Status: DISCONTINUED | OUTPATIENT
Start: 2021-09-28 | End: 2021-09-28

## 2021-09-28 RX ORDER — SODIUM CHLORIDE 9 MG/ML
125 INJECTION, SOLUTION INTRAVENOUS CONTINUOUS
Status: DISCONTINUED | OUTPATIENT
Start: 2021-09-28 | End: 2021-09-28 | Stop reason: HOSPADM

## 2021-09-28 RX ADMIN — KETOROLAC TROMETHAMINE 30 MG: 30 INJECTION, SOLUTION INTRAMUSCULAR at 10:24

## 2021-09-28 RX ADMIN — LIDOCAINE HYDROCHLORIDE 60 MG: 20 INJECTION, SOLUTION EPIDURAL; INFILTRATION; INTRACAUDAL; PERINEURAL at 09:56

## 2021-09-28 RX ADMIN — DEXAMETHASONE SODIUM PHOSPHATE 4 MG: 4 INJECTION INTRA-ARTICULAR; INTRALESIONAL; INTRAMUSCULAR; INTRAVENOUS; SOFT TISSUE at 09:59

## 2021-09-28 RX ADMIN — ONDANSETRON 4 MG: 2 INJECTION INTRAMUSCULAR; INTRAVENOUS at 09:59

## 2021-09-28 RX ADMIN — MIDAZOLAM 2 MG: 1 INJECTION INTRAMUSCULAR; INTRAVENOUS at 09:50

## 2021-09-28 RX ADMIN — EPHEDRINE SULFATE 10 MG: 50 INJECTION, SOLUTION INTRAVENOUS at 10:09

## 2021-09-28 RX ADMIN — PROPOFOL 200 MG: 10 INJECTION, EMULSION INTRAVENOUS at 09:56

## 2021-09-28 RX ADMIN — ONDANSETRON 4 MG: 2 INJECTION INTRAMUSCULAR; INTRAVENOUS at 11:04

## 2021-09-28 RX ADMIN — FENTANYL CITRATE 25 MCG: 50 INJECTION INTRAMUSCULAR; INTRAVENOUS at 10:14

## 2021-09-28 RX ADMIN — SODIUM CHLORIDE 125 ML/HR: 0.9 INJECTION, SOLUTION INTRAVENOUS at 09:19

## 2021-09-28 RX ADMIN — FENTANYL CITRATE 25 MCG: 50 INJECTION INTRAMUSCULAR; INTRAVENOUS at 10:06

## 2021-09-28 NOTE — ANESTHESIA POSTPROCEDURE EVALUATION
Post-Op Assessment Note    CV Status:  Stable    Pain management: adequate     Mental Status:  Alert and awake   Hydration Status:  Euvolemic   PONV Controlled:  Controlled   Airway Patency:  Patent      Post Op Vitals Reviewed: Yes      Staff: Anesthesiologist         No complications documented      BP (P) 99/51 (09/28/21 1145)    Temp      Pulse (P) 94 (09/28/21 1145)   Resp (P) 16 (09/28/21 1145)    SpO2 (P) 95 % (09/28/21 1145)

## 2021-09-28 NOTE — ANESTHESIA PREPROCEDURE EVALUATION
Review of Systems/Medical History  Patient summary reviewed  Chart reviewed  No history of anesthetic complications     Cardiovascular  Negative cardio ROS Exercise tolerance (METS): good  ,     Pulmonary  Asthma (no meds) , seasonal/exercise induced , Recent URI: viral URI> 1 mo ago  , No sleep apnea ,        GI/Hepatic    GERD ,   Comment: Gastroparesis      Negative  ROS        Endo/Other  History of thyroid disease , hypothyroidism,      GYN  Not currently pregnant ,          Hematology  Negative hematology ROS      Musculoskeletal  Negative musculoskeletal ROS        Neurology    Headaches,    Psychology   Anxiety, Depression ,              Physical Exam    Airway    Mallampati score: II  TM Distance: >3 FB  Neck ROM: full     Dental   Comment: prominent incisors, none loose,     Cardiovascular  Comment: Negative ROS, Rhythm: regular, Rate: normal, Cardiovascular exam normal    Pulmonary  Pulmonary exam normal Breath sounds clear to auscultation,     Other Findings      Lab Results   Component Value Date     00 06/29/2021    HGB 11 7 06/29/2021     (H) 06/29/2021     Lab Results   Component Value Date     07/15/2015    K 4 4 03/17/2021    BUN 14 03/17/2021    CREATININE 0 90 03/17/2021    GLUCOSE 72 07/15/2015     Anesthesia Plan  ASA Score- 2     Anesthesia Type- general with ASA Monitors  Additional Monitors:   Airway Plan:           Plan Factors-Exercise tolerance (METS): good  Chart reviewed  Existing labs reviewed  Patient summary reviewed  Patient is not a current smoker  Patient instructed to abstain from smoking on day of procedure  Patient did not smoke on day of surgery  Induction- intravenous  Postoperative Plan-     Informed Consent- Anesthetic plan and risks discussed with patient

## 2021-10-03 ENCOUNTER — APPOINTMENT (EMERGENCY)
Dept: RADIOLOGY | Facility: HOSPITAL | Age: 26
End: 2021-10-03
Payer: COMMERCIAL

## 2021-10-03 ENCOUNTER — HOSPITAL ENCOUNTER (EMERGENCY)
Facility: HOSPITAL | Age: 26
Discharge: HOME | End: 2021-10-03
Attending: EMERGENCY MEDICINE
Payer: COMMERCIAL

## 2021-10-03 VITALS
HEART RATE: 98 BPM | OXYGEN SATURATION: 98 % | WEIGHT: 122.36 LBS | BODY MASS INDEX: 23.9 KG/M2 | TEMPERATURE: 98.7 F | DIASTOLIC BLOOD PRESSURE: 62 MMHG | RESPIRATION RATE: 18 BRPM | SYSTOLIC BLOOD PRESSURE: 100 MMHG

## 2021-10-03 DIAGNOSIS — R11.0 NAUSEA: ICD-10-CM

## 2021-10-03 DIAGNOSIS — R07.89 ATYPICAL CHEST PAIN: Primary | ICD-10-CM

## 2021-10-03 LAB
ANION GAP SERPL CALCULATED.3IONS-SCNC: 17 MMOL/L (ref 4–13)
BASOPHILS # BLD AUTO: 0.05 THOUSANDS/ΜL (ref 0–0.1)
BASOPHILS NFR BLD AUTO: 1 % (ref 0–1)
BUN SERPL-MCNC: 12 MG/DL (ref 5–25)
CALCIUM SERPL-MCNC: 9.5 MG/DL (ref 8.3–10.1)
CHLORIDE SERPL-SCNC: 99 MMOL/L (ref 100–108)
CO2 SERPL-SCNC: 20 MMOL/L (ref 21–32)
CREAT SERPL-MCNC: 0.94 MG/DL (ref 0.6–1.3)
EOSINOPHIL # BLD AUTO: 0.03 THOUSAND/ΜL (ref 0–0.61)
EOSINOPHIL NFR BLD AUTO: 0 % (ref 0–6)
ERYTHROCYTE [DISTWIDTH] IN BLOOD BY AUTOMATED COUNT: 13.6 % (ref 11.6–15.1)
EXT PREG TEST URINE: NEGATIVE
EXT. CONTROL ED NAV: NORMAL
GFR SERPL CREATININE-BSD FRML MDRD: 85 ML/MIN/1.73SQ M
GLUCOSE SERPL-MCNC: 95 MG/DL (ref 65–140)
HCT VFR BLD AUTO: 39.1 % (ref 34.8–46.1)
HGB BLD-MCNC: 13 G/DL (ref 11.5–15.4)
IMM GRANULOCYTES # BLD AUTO: 0.03 THOUSAND/UL (ref 0–0.2)
IMM GRANULOCYTES NFR BLD AUTO: 0 % (ref 0–2)
LYMPHOCYTES # BLD AUTO: 0.9 THOUSANDS/ΜL (ref 0.6–4.47)
LYMPHOCYTES NFR BLD AUTO: 10 % (ref 14–44)
MCH RBC QN AUTO: 27.1 PG (ref 26.8–34.3)
MCHC RBC AUTO-ENTMCNC: 33.2 G/DL (ref 31.4–37.4)
MCV RBC AUTO: 82 FL (ref 82–98)
MONOCYTES # BLD AUTO: 0.33 THOUSAND/ΜL (ref 0.17–1.22)
MONOCYTES NFR BLD AUTO: 4 % (ref 4–12)
NEUTROPHILS # BLD AUTO: 7.52 THOUSANDS/ΜL (ref 1.85–7.62)
NEUTS SEG NFR BLD AUTO: 85 % (ref 43–75)
NRBC BLD AUTO-RTO: 0 /100 WBCS
PLATELET # BLD AUTO: 332 THOUSANDS/UL (ref 149–390)
PMV BLD AUTO: 9.1 FL (ref 8.9–12.7)
POTASSIUM SERPL-SCNC: 3.6 MMOL/L (ref 3.5–5.3)
RBC # BLD AUTO: 4.79 MILLION/UL (ref 3.81–5.12)
SARS-COV-2 RNA RESP QL NAA+PROBE: NEGATIVE
SODIUM SERPL-SCNC: 136 MMOL/L (ref 136–145)
TROPONIN I SERPL-MCNC: <0.02 NG/ML
WBC # BLD AUTO: 8.86 THOUSAND/UL (ref 4.31–10.16)

## 2021-10-03 PROCEDURE — 96361 HYDRATE IV INFUSION ADD-ON: CPT

## 2021-10-03 PROCEDURE — 85025 COMPLETE CBC W/AUTO DIFF WBC: CPT | Performed by: PHYSICIAN ASSISTANT

## 2021-10-03 PROCEDURE — U0003 INFECTIOUS AGENT DETECTION BY NUCLEIC ACID (DNA OR RNA); SEVERE ACUTE RESPIRATORY SYNDROME CORONAVIRUS 2 (SARS-COV-2) (CORONAVIRUS DISEASE [COVID-19]), AMPLIFIED PROBE TECHNIQUE, MAKING USE OF HIGH THROUGHPUT TECHNOLOGIES AS DESCRIBED BY CMS-2020-01-R: HCPCS | Performed by: PHYSICIAN ASSISTANT

## 2021-10-03 PROCEDURE — 71045 X-RAY EXAM CHEST 1 VIEW: CPT

## 2021-10-03 PROCEDURE — 96375 TX/PRO/DX INJ NEW DRUG ADDON: CPT

## 2021-10-03 PROCEDURE — 81025 URINE PREGNANCY TEST: CPT | Performed by: PHYSICIAN ASSISTANT

## 2021-10-03 PROCEDURE — 84484 ASSAY OF TROPONIN QUANT: CPT | Performed by: PHYSICIAN ASSISTANT

## 2021-10-03 PROCEDURE — U0005 INFEC AGEN DETEC AMPLI PROBE: HCPCS | Performed by: PHYSICIAN ASSISTANT

## 2021-10-03 PROCEDURE — 96374 THER/PROPH/DIAG INJ IV PUSH: CPT

## 2021-10-03 PROCEDURE — 80048 BASIC METABOLIC PNL TOTAL CA: CPT | Performed by: PHYSICIAN ASSISTANT

## 2021-10-03 PROCEDURE — 99285 EMERGENCY DEPT VISIT HI MDM: CPT

## 2021-10-03 PROCEDURE — 36415 COLL VENOUS BLD VENIPUNCTURE: CPT | Performed by: PHYSICIAN ASSISTANT

## 2021-10-03 PROCEDURE — 99285 EMERGENCY DEPT VISIT HI MDM: CPT | Performed by: PHYSICIAN ASSISTANT

## 2021-10-03 RX ORDER — ONDANSETRON 4 MG/1
4 TABLET, ORALLY DISINTEGRATING ORAL EVERY 6 HOURS PRN
Qty: 20 TABLET | Refills: 0 | Status: SHIPPED | OUTPATIENT
Start: 2021-10-03 | End: 2021-10-03

## 2021-10-03 RX ORDER — FAMOTIDINE 20 MG/1
20 TABLET, FILM COATED ORAL 2 TIMES DAILY
Qty: 30 TABLET | Refills: 0 | Status: SHIPPED | OUTPATIENT
Start: 2021-10-03

## 2021-10-03 RX ORDER — MAGNESIUM HYDROXIDE/ALUMINUM HYDROXICE/SIMETHICONE 120; 1200; 1200 MG/30ML; MG/30ML; MG/30ML
30 SUSPENSION ORAL ONCE
Status: DISCONTINUED | OUTPATIENT
Start: 2021-10-03 | End: 2021-10-03 | Stop reason: HOSPADM

## 2021-10-03 RX ORDER — ONDANSETRON 4 MG/1
4 TABLET, FILM COATED ORAL EVERY 6 HOURS
Qty: 20 TABLET | Refills: 0 | Status: SHIPPED | OUTPATIENT
Start: 2021-10-03 | End: 2022-04-05 | Stop reason: SDUPTHER

## 2021-10-03 RX ORDER — ONDANSETRON 2 MG/ML
4 INJECTION INTRAMUSCULAR; INTRAVENOUS ONCE
Status: COMPLETED | OUTPATIENT
Start: 2021-10-03 | End: 2021-10-03

## 2021-10-03 RX ORDER — LORAZEPAM 0.5 MG/1
0.5 TABLET ORAL ONCE
Status: COMPLETED | OUTPATIENT
Start: 2021-10-03 | End: 2021-10-03

## 2021-10-03 RX ADMIN — ONDANSETRON 4 MG: 2 INJECTION INTRAMUSCULAR; INTRAVENOUS at 08:08

## 2021-10-03 RX ADMIN — SODIUM CHLORIDE 1000 ML: 0.9 INJECTION, SOLUTION INTRAVENOUS at 08:08

## 2021-10-03 RX ADMIN — FAMOTIDINE 20 MG: 10 INJECTION INTRAVENOUS at 08:12

## 2021-10-03 RX ADMIN — LORAZEPAM 0.5 MG: 0.5 TABLET ORAL at 08:40

## 2021-10-11 ENCOUNTER — OFFICE VISIT (OUTPATIENT)
Dept: OBGYN CLINIC | Facility: CLINIC | Age: 26
End: 2021-10-11
Payer: COMMERCIAL

## 2021-10-11 VITALS
WEIGHT: 125.4 LBS | DIASTOLIC BLOOD PRESSURE: 60 MMHG | SYSTOLIC BLOOD PRESSURE: 96 MMHG | BODY MASS INDEX: 24.49 KG/M2 | HEART RATE: 101 BPM

## 2021-10-11 DIAGNOSIS — N89.8 VAGINAL DISCHARGE: ICD-10-CM

## 2021-10-11 DIAGNOSIS — Z98.890 S/P LEEP: ICD-10-CM

## 2021-10-11 DIAGNOSIS — N87.1 DYSPLASIA OF CERVIX, HIGH GRADE CIN 2: Primary | ICD-10-CM

## 2021-10-11 PROCEDURE — 99213 OFFICE O/P EST LOW 20 MIN: CPT | Performed by: OBSTETRICS & GYNECOLOGY

## 2021-10-11 PROCEDURE — 87210 SMEAR WET MOUNT SALINE/INK: CPT | Performed by: OBSTETRICS & GYNECOLOGY

## 2021-10-12 ENCOUNTER — OFFICE VISIT (OUTPATIENT)
Dept: OBGYN CLINIC | Facility: CLINIC | Age: 26
End: 2021-10-12
Payer: COMMERCIAL

## 2021-10-12 VITALS
DIASTOLIC BLOOD PRESSURE: 76 MMHG | TEMPERATURE: 97.4 F | HEART RATE: 73 BPM | SYSTOLIC BLOOD PRESSURE: 98 MMHG | WEIGHT: 126.2 LBS | BODY MASS INDEX: 24.65 KG/M2

## 2021-10-12 DIAGNOSIS — N93.9 ABNORMAL UTERINE BLEEDING (AUB): ICD-10-CM

## 2021-10-12 DIAGNOSIS — Z98.890 STATUS POST LEEP (LOOP ELECTROSURGICAL EXCISION PROCEDURE) OF CERVIX: Primary | ICD-10-CM

## 2021-10-12 PROCEDURE — 1036F TOBACCO NON-USER: CPT | Performed by: OBSTETRICS & GYNECOLOGY

## 2021-10-12 PROCEDURE — 99213 OFFICE O/P EST LOW 20 MIN: CPT | Performed by: OBSTETRICS & GYNECOLOGY

## 2021-12-21 ENCOUNTER — TELEPHONE (OUTPATIENT)
Dept: FAMILY MEDICINE CLINIC | Facility: CLINIC | Age: 26
End: 2021-12-21

## 2021-12-21 ENCOUNTER — TELEPHONE (OUTPATIENT)
Dept: SURGICAL ONCOLOGY | Facility: CLINIC | Age: 26
End: 2021-12-21

## 2021-12-21 ENCOUNTER — OFFICE VISIT (OUTPATIENT)
Dept: FAMILY MEDICINE CLINIC | Facility: CLINIC | Age: 26
End: 2021-12-21
Payer: COMMERCIAL

## 2021-12-21 VITALS
TEMPERATURE: 99 F | RESPIRATION RATE: 16 BRPM | DIASTOLIC BLOOD PRESSURE: 70 MMHG | WEIGHT: 127.6 LBS | SYSTOLIC BLOOD PRESSURE: 92 MMHG | BODY MASS INDEX: 25.05 KG/M2 | HEIGHT: 60 IN | HEART RATE: 87 BPM

## 2021-12-21 DIAGNOSIS — F51.01 PRIMARY INSOMNIA: ICD-10-CM

## 2021-12-21 DIAGNOSIS — E03.9 HYPOTHYROIDISM, UNSPECIFIED TYPE: ICD-10-CM

## 2021-12-21 DIAGNOSIS — R63.5 WEIGHT GAIN: Primary | ICD-10-CM

## 2021-12-21 DIAGNOSIS — F41.9 ANXIETY: ICD-10-CM

## 2021-12-21 PROCEDURE — 1036F TOBACCO NON-USER: CPT | Performed by: PHYSICIAN ASSISTANT

## 2021-12-21 PROCEDURE — 3008F BODY MASS INDEX DOCD: CPT | Performed by: PHYSICIAN ASSISTANT

## 2021-12-21 PROCEDURE — 99214 OFFICE O/P EST MOD 30 MIN: CPT | Performed by: PHYSICIAN ASSISTANT

## 2021-12-21 PROCEDURE — 3725F SCREEN DEPRESSION PERFORMED: CPT | Performed by: PHYSICIAN ASSISTANT

## 2021-12-21 RX ORDER — ESCITALOPRAM OXALATE 10 MG/1
10 TABLET ORAL DAILY
Qty: 30 TABLET | Refills: 0 | Status: SHIPPED | OUTPATIENT
Start: 2021-12-21 | End: 2021-12-21 | Stop reason: SDUPTHER

## 2021-12-21 RX ORDER — LEVOTHYROXINE SODIUM 0.03 MG/1
25 TABLET ORAL DAILY
Qty: 90 TABLET | Refills: 1 | Status: SHIPPED | OUTPATIENT
Start: 2021-12-21 | End: 2022-03-30 | Stop reason: SDUPTHER

## 2021-12-21 RX ORDER — TRAZODONE HYDROCHLORIDE 50 MG/1
50 TABLET ORAL
Qty: 30 TABLET | Refills: 5 | Status: SHIPPED | OUTPATIENT
Start: 2021-12-21

## 2021-12-21 RX ORDER — LEVOTHYROXINE SODIUM 0.03 MG/1
25 TABLET ORAL DAILY
Qty: 90 TABLET | Refills: 1 | Status: SHIPPED | OUTPATIENT
Start: 2021-12-21 | End: 2021-12-21 | Stop reason: SDUPTHER

## 2021-12-21 RX ORDER — ESCITALOPRAM OXALATE 10 MG/1
10 TABLET ORAL DAILY
Qty: 90 TABLET | Refills: 1 | Status: SHIPPED | OUTPATIENT
Start: 2021-12-21 | End: 2022-03-30 | Stop reason: SDUPTHER

## 2021-12-23 ENCOUNTER — APPOINTMENT (OUTPATIENT)
Dept: LAB | Facility: MEDICAL CENTER | Age: 26
End: 2021-12-23
Payer: COMMERCIAL

## 2021-12-23 DIAGNOSIS — E03.9 HYPOTHYROIDISM, UNSPECIFIED TYPE: ICD-10-CM

## 2021-12-23 DIAGNOSIS — R63.5 WEIGHT GAIN: ICD-10-CM

## 2021-12-23 LAB
ALBUMIN SERPL BCP-MCNC: 3.5 G/DL (ref 3.5–5)
ALP SERPL-CCNC: 67 U/L (ref 46–116)
ALT SERPL W P-5'-P-CCNC: 20 U/L (ref 12–78)
ANION GAP SERPL CALCULATED.3IONS-SCNC: 8 MMOL/L (ref 4–13)
AST SERPL W P-5'-P-CCNC: 26 U/L (ref 5–45)
BILIRUB SERPL-MCNC: 0.39 MG/DL (ref 0.2–1)
BUN SERPL-MCNC: 10 MG/DL (ref 5–25)
CALCIUM SERPL-MCNC: 9.2 MG/DL (ref 8.3–10.1)
CHLORIDE SERPL-SCNC: 107 MMOL/L (ref 100–108)
CO2 SERPL-SCNC: 23 MMOL/L (ref 21–32)
CREAT SERPL-MCNC: 0.81 MG/DL (ref 0.6–1.3)
GFR SERPL CREATININE-BSD FRML MDRD: 101 ML/MIN/1.73SQ M
GLUCOSE P FAST SERPL-MCNC: 95 MG/DL (ref 65–99)
POTASSIUM SERPL-SCNC: 4.1 MMOL/L (ref 3.5–5.3)
PROT SERPL-MCNC: 7.1 G/DL (ref 6.4–8.2)
SODIUM SERPL-SCNC: 138 MMOL/L (ref 136–145)
TSH SERPL DL<=0.05 MIU/L-ACNC: 1.87 UIU/ML (ref 0.36–3.74)

## 2021-12-23 PROCEDURE — 84443 ASSAY THYROID STIM HORMONE: CPT | Performed by: PHYSICIAN ASSISTANT

## 2021-12-23 PROCEDURE — 80053 COMPREHEN METABOLIC PANEL: CPT

## 2021-12-23 PROCEDURE — 36415 COLL VENOUS BLD VENIPUNCTURE: CPT | Performed by: PHYSICIAN ASSISTANT

## 2021-12-29 ENCOUNTER — DOCUMENTATION (OUTPATIENT)
Dept: GENETICS | Facility: CLINIC | Age: 26
End: 2021-12-29

## 2021-12-29 ENCOUNTER — CLINICAL SUPPORT (OUTPATIENT)
Dept: GENETICS | Facility: CLINIC | Age: 26
End: 2021-12-29

## 2021-12-29 DIAGNOSIS — Z80.3 FAMILY HISTORY OF BREAST CANCER: Primary | ICD-10-CM

## 2021-12-29 PROCEDURE — NC001 PR NO CHARGE: Performed by: GENETIC COUNSELOR, MS

## 2021-12-30 ENCOUNTER — CLINICAL SUPPORT (OUTPATIENT)
Dept: GENETICS | Facility: CLINIC | Age: 26
End: 2021-12-30
Payer: COMMERCIAL

## 2021-12-30 DIAGNOSIS — Z80.3 FAMILY HISTORY OF MALIGNANT NEOPLASM OF BREAST: Primary | ICD-10-CM

## 2021-12-30 PROCEDURE — 36415 COLL VENOUS BLD VENIPUNCTURE: CPT

## 2022-01-13 DIAGNOSIS — Z30.41 ENCOUNTER FOR BIRTH CONTROL PILLS MAINTENANCE: ICD-10-CM

## 2022-01-14 RX ORDER — DROSPIRENONE, ETHINYL ESTRADIOL AND LEVOMEFOLATE CALCIUM AND LEVOMEFOLATE CALCIUM 3-0.02(24)
1 KIT ORAL DAILY
Qty: 84 TABLET | Refills: 0 | Status: SHIPPED | OUTPATIENT
Start: 2022-01-14 | End: 2022-05-11 | Stop reason: SDUPTHER

## 2022-01-20 ENCOUNTER — TELEPHONE (OUTPATIENT)
Dept: GENETICS | Facility: CLINIC | Age: 27
End: 2022-01-20

## 2022-01-20 NOTE — TELEPHONE ENCOUNTER
Post-Test Genetic Counseling Consult Note  Today I left a voicemail for Lesley reviewing the results of her genetic test for hereditary cancer  We met previously on 12/29/2022 for pre-test counseling  I encouraged Maribell Oliva to call [phone] to discuss this result further  We will mail the genetic test result and a copy of this consult note to the home address  SUMMARY:    Test(s): CancerNext (36 genes): APC, JAZMINE, AXIN2 BARD1, BRCA1, BRCA2, BRIP1, BMPR1A, CDH1, CDK4, CDKN2A, CHEK2, DICER1, EPCAM, GREM1, HOXB13, MLH1, MSH2, MSH3, MSH6, MUTYH, NBN, NF1, NTHL1, PALB2, PMS2, POLD1, POLE, PTEN, RAD51C, RAD51D, RECQL SMAD4, SMARCA4, STK11, TP53    Result: Negative - No Clinically Significant Variants Detected      Assessment:   A negative result significantly reduces the likelihood that Maribell Oliva has a hereditary cancer syndrome  However, this testing is unable to completely rule out the presence of hereditary cancer  It remains possible that:  - There is a variant in an area of a gene which was not tested or there is a variant not detectable due to technical limitations of this test      - There is a variant in another gene that was not included in this test or in a gene not known to be linked to cancer or tumors  - A family member has a genetic variant that the patient did not inherit  - The cancer in the family is sporadic and is related to non-hereditary factors  Risk based on Family History:  Lesley's risk of breast cancer may still be increased based on her personal risk factors and family history  Despite a negative test result, we are able to run risk models to better estimate Lesley's lifetime risk of developing breast cancer  I ran two risk models based on the information Maribell Oliva provided during our pre-test counseling session:    Bay model: Estimated lifetime risk, to age 80, for breast cancer is 23% compared to approximately 11 3% general population risk     Kaycee Lund model:  This model was not used as it is not validated in women under the age of 28    Jason tables: Estimated lifetime risk, to age 78, for breast cancer is 16 5%    The Kariner-Braydon model predicts that Lesley's lifetime risk for breast cancer is at least 20% therefore she qualifies for increased breast cancer screening, which is outlined below in the plan section  Additional Information:  A healthy lifestyle will improve overall health and reduce risk for illness  Eating a healthy diet and exercising for 4 hours per week is recommended  Both diet and exercise have been shown to help maintain a healthy weight  Postmenopausal women who are overweight are at higher risk for breast cancer  Moderate to heavy alcohol use can increase the risk for some cancers  Smoking cigarettes can also increase risk for breast, lung, prostate, pancreatic and other cancers  Plan:   Recommendations for Zackary Beach are outlined below based on her family history, however the surveillance and medical management should continue as clinically indicated and as determined appropriate by her healthcare providers  Breast cancer screening per the NCCN Breast Cancer Screening and Diagnosis Guideline v2 2021    Screening for women who have a lifetime risk of >20% as defined by models that are largely dependent on family history:   Clinical encounter every 6-12 months    Annual screening mammogram and consideration of tomosynthesis, to begin 10 years prior to the youngest family member but not less than age 27 years  Reji Weber Recommend annual breast MRI to begin 10 years prior to the youngest family member but not less than age 22 years   Consider whole breast ultrasound or contrast-enhanced mammography for those who qualify for but cannot undergo MRI   Breast awareness   Consider risk reduction strategies       Negative Result: Zackary Beach was strongly encouraged to contact us regarding any changes in her personal or family history of cancer as these changes could alter our recommendation regarding genetic testing and/or cancer screening

## 2022-01-24 ENCOUNTER — TELEPHONE (OUTPATIENT)
Dept: GENETICS | Facility: CLINIC | Age: 27
End: 2022-01-24

## 2022-01-24 NOTE — TELEPHONE ENCOUNTER
----- [facility] sent at 1/20/2022 12:19 PM EST -----  Regarding: Upload consult note and result  GC Completed Chart     Result Type: Negative    Result Delivery: Dine perfecthart Message    Monthly Review:Does not need monthly review- COMPLETE

## 2022-03-30 ENCOUNTER — TELEPHONE (OUTPATIENT)
Dept: FAMILY MEDICINE CLINIC | Facility: CLINIC | Age: 27
End: 2022-03-30

## 2022-03-30 DIAGNOSIS — F41.9 ANXIETY: ICD-10-CM

## 2022-03-30 DIAGNOSIS — E03.9 HYPOTHYROIDISM, UNSPECIFIED TYPE: ICD-10-CM

## 2022-03-30 RX ORDER — LEVOTHYROXINE SODIUM 0.03 MG/1
25 TABLET ORAL DAILY
Qty: 90 TABLET | Refills: 1 | Status: SHIPPED | OUTPATIENT
Start: 2022-03-30

## 2022-03-30 RX ORDER — ESCITALOPRAM OXALATE 10 MG/1
10 TABLET ORAL DAILY
Qty: 90 TABLET | Refills: 1 | Status: SHIPPED | OUTPATIENT
Start: 2022-03-30

## 2022-03-30 NOTE — TELEPHONE ENCOUNTER
Pt is requesting refills of the following medications:    Medication: Levothyroxine  Day supply: 90  Pharmacy: [facility], [address] [place] [address] [facility] P: [phone]]   Last office visit: 12/21/2021  Upcoming office visit: N/A  Pt only has today left       Medication: Lexapro  Day supply: 90  Pharmacy: [facility], [address] [place] [address] [facility] P: [phone]]   Last office visit: 12/21/2021  Upcoming office visit: N/A  Pt has a few more days left

## 2022-03-30 NOTE — ADDENDUM NOTE
Addended by: Chevy Porter on: 3/30/2022 03:58 PM     Modules accepted: Orders Phone number not reachable.   can you send in mail please.  Janae MORALES, RN

## 2022-04-05 ENCOUNTER — PATIENT MESSAGE (OUTPATIENT)
Dept: FAMILY MEDICINE CLINIC | Facility: CLINIC | Age: 27
End: 2022-04-05

## 2022-04-05 ENCOUNTER — TELEPHONE (OUTPATIENT)
Dept: FAMILY MEDICINE CLINIC | Facility: CLINIC | Age: 27
End: 2022-04-05

## 2022-04-05 DIAGNOSIS — R11.0 NAUSEA: ICD-10-CM

## 2022-04-05 RX ORDER — ONDANSETRON 4 MG/1
4 TABLET, FILM COATED ORAL EVERY 6 HOURS
Qty: 20 TABLET | Refills: 0 | Status: SHIPPED | OUTPATIENT
Start: 2022-04-05

## 2022-04-05 NOTE — TELEPHONE ENCOUNTER
----- Message from [place], [address] [place] sent at 4/5/2022  8:14 AM EDT -----  Regarding: FW: Zofran prescription     ----- Message -----  From: Feliz Al  Sent: 4/5/2022   4:45 AM EDT  To: [facility]  Subject: Zofran prescription                              Sorry I forgot to add that if you are able to prescribe it, if you could please make it the pill form and not the melting tablets  They seem to make me nauseous ironically   Thank you

## 2022-04-14 NOTE — PATIENT INSTRUCTIONS
Pap Smear   WHAT YOU NEED TO KNOW:   What do I need to know about a Pap smear? A Pap smear, or Pap test, is used to screen for cervical cancer  It is also used to find precancerous and cancerous cells of the vulva and vagina  How do I prepare for a Pap smear? The best time to schedule the test is right after your period stops  Do not have a Pap smear during your monthly period  What will happen during a Pap smear? · You will lie on your back and place your feet on footrests called stirrups  Your healthcare provider will gently insert a device called a speculum into your vagina  The speculum is used to open the walls of your vagina so your provider can see your cervix  · Your provider will gently take cell samples from your cervix and vagina  The samples are placed in a container with liquid or on a glass slide  They are sent to a lab and examined for abnormal cells  A test for human papillomavirus (HPV) may be done at the same time  HPV is a sexually transmitted virus that can cause changes in cervical cells  What will happen after a Pap smear? You may have some spotting the day of your procedure  What do my test results mean? Your healthcare provider will tell you when you can expect your Pap smear results  It usually takes about 1 to 3 weeks  · Normal results  mean that no cell changes were found on your cervix  You may be able to wait 3 to 5 years for your next Pap smear exam     · Unclear results  may mean they did not get a good sample of cervical tissue  Or, it may mean there are changes in your cells that look abnormal  This could be due to an infection, pregnancy, menopause, or HPV  You may need another Pap smear in 1 year  Your healthcare provider will tell you what to do next  · Abnormal results  mean that cell changes were found on your cervix  This does not mean you have cervical cancer  It could mean you have inflammation or an infection   It could also mean you have HPV or cells that might develop into cancer  Your healthcare provider will explain the abnormal test result to you and go over next steps with you  He or she may recommend a colposcopy  During this procedure, a small scope with a light is used to look more closely at your cervix and vagina  How often do I need to get a Pap smear? Pap smears usually start at age 24 and continue until age 72  A Pap smear alone may be done every 3 years  An HPV test alone or with a Pap smear may be done every 5 years, starting at age 27  You may need Pap smears more often or continuing after age 72 if you have any of the following:  · Abnormal Pap smear result    · Positive HPV test result    · A history of cervical cancer, or a high risk for cervical cancer    · HIV    · A weak immune system    · Exposure to diethylstilbestrol (JUAN DANIEL) medicine when your mother was pregnant with you    When should I call my doctor? · You have severe bleeding  · It has been 3 weeks and you do not have test results  · You have questions or concerns about your condition or care  CARE AGREEMENT:   You have the right to help plan your care  Learn about your health condition and how it may be treated  Discuss treatment options with your healthcare providers to decide what care you want to receive  You always have the right to refuse treatment  The above information is an  only  It is not intended as medical advice for individual conditions or treatments  Talk to your doctor, nurse or pharmacist before following any medical regimen to see if it is safe and effective for you  © Copyright [employer] 2022 Information is for End User's use only and may not be sold, redistributed or otherwise used for commercial purposes   All illustrations and images included in CareNotes® are the copyrighted property of [employer]  or [address] [place], [place] [address]

## 2022-04-14 NOTE — PROGRESS NOTES
Assessment/Plan:    Problem List Items Addressed This Visit        Genitourinary    Dysplasia of cervix, high grade PIERO 2 - Primary       Other    High risk HPV infection        Repeat Pap smear and cervical cytology screening and HPV testing was performed today  Advised patient to return in 6 months for repeat Pap in HPV as well  Advised patient repeat colposcopy if HPV persistent or Pap smear abnormal   Otherwise, will see patient back in 6 months for repeat Pap and annual gyn exam   Advised to call if with any questions or concerns  Continue oral contraceptive pills  Subjective   Patient ID: Christal Nelson is a 33 y o  female  Patient is here for a follow-up  Chief Complaint   Patient presents with    Gynecologic Exam     repeat pap, 6 mo f/u     Patient is here for a repeat Pap smear and HPV testing  Patient with history of PIERO 2 status post LEEP with negative margins 6 months ago  Patient has no complaints  She has not had a new partner and declines STD screening  She has no complaints today  Menstrual History:  OB History        0    Para   0    Term   0       0    AB   0    Living   0       SAB   0    IAB   0    Ectopic   0    Multiple   0    Live Births   0                  Patient's last menstrual period was 2022 (exact date)           Past Medical History:   Diagnosis Date    Anxiety     Claustrophobia     Depression     Disease of thyroid gland     Gastric paresis     GERD (gastroesophageal reflux disease)     Irritable bowel syndrome     Migraines     Motion sickness     Seasonal allergies     Wears glasses        Past Surgical History:   Procedure Laterality Date    COLONOSCOPY      KS CONIZATION CERVIX,LOOP ELECTRD N/A 2021    Procedure: BIOPSY LEEP CERVIX;  Surgeon: Barrera Hernandez MD;  Location: [facility];  Service: Gynecology    KS ESOPHAGOGASTRODUODENOSCOPY TRANSORAL DIAGNOSTIC N/A 3/15/2018    Procedure: ESOPHAGOGASTRODUODENOSCOPY (EGD); Surgeon: Hasmukh Juarez MD;  [facility]; Service: Gastroenterology    PYLOROPLASTY      TONSILLECTOMY         Social History     Tobacco Use    Smoking status: Never Smoker    Smokeless tobacco: Never Used   Vaping Use    Vaping Use: Never used   Substance Use Topics    Alcohol use: Not Currently     Comment: rare     Drug use: No       Allergies   Allergen Reactions    Compazine [Prochlorperazine] Anaphylaxis    Penicillins Hives         Current Outpatient Medications:     cetirizine (ZyrTEC) 10 mg tablet, Take 10 mg by mouth daily, Disp: , Rfl:     Drospiren-Eth Estrad-Levomefol 3-0 02-0 451 MG TABS, Take 1 tablet by mouth daily, Disp: 84 tablet, Rfl: 0    escitalopram (LEXAPRO) 10 mg tablet, Take 1 tablet (10 mg total) by mouth daily, Disp: 90 tablet, Rfl: 1    levothyroxine 25 mcg tablet, Take 1 tablet (25 mcg total) by mouth daily, Disp: 90 tablet, Rfl: 1    ondansetron (ZOFRAN) 4 mg tablet, Take 1 tablet (4 mg total) by mouth every 6 (six) hours, Disp: 20 tablet, Rfl: 0    famotidine (PEPCID) 20 mg tablet, Take 1 tablet (20 mg total) by mouth 2 (two) times a day (Patient not taking: Reported on 4/15/2022 ), Disp: 30 tablet, Rfl: 0    traZODone (DESYREL) 50 mg tablet, Take 1 tablet (50 mg total) by mouth daily at bedtime (Patient not taking: Reported on 4/15/2022 ), Disp: 30 tablet, Rfl: 5      Review of Systems   Constitutional: Negative  HENT: Negative  Eyes: Negative  Respiratory: Negative  Cardiovascular: Negative  Gastrointestinal: Negative  Endocrine: Negative  Genitourinary:        As noted in HPI   Musculoskeletal: Negative  Skin: Negative  Allergic/Immunologic: Negative  Neurological: Negative  Hematological: Negative  Psychiatric/Behavioral: Negative            /64 (BP Location: Right arm, Patient Position: Sitting, Cuff Size: Adult)   Pulse 81   Temp 98 1 °F (36 7 °C)   Ht 5' (1 524 m)   Wt 58 4 kg (128 lb 12 8 oz)   LMP 04/12/2022 (Exact Date)   BMI 25 15 kg/m²       Physical Exam  Constitutional:       General: She is not in acute distress  Appearance: She is well-developed  Genitourinary:      Bladder and urethral meatus normal       No lesions in the vagina  Right Labia: No rash, tenderness, lesions, skin changes or Bartholin's cyst      Left Labia: No tenderness, lesions, skin changes, Bartholin's cyst or rash  No vaginal discharge, erythema, tenderness or bleeding  No vaginal prolapse present  No vaginal atrophy present  Right Adnexa: not tender, not full and no mass present  Left Adnexa: not tender, not full and no mass present  No cervical polyp  Uterus is not enlarged or tender  No uterine mass detected  Pelvic exam was performed with patient in the lithotomy position  Rectum:      No tenderness  Cardiovascular:      Rate and Rhythm: Normal rate  Pulmonary:      Effort: Pulmonary effort is normal    Abdominal:      General: There is no distension  Palpations: Abdomen is soft  Tenderness: There is no abdominal tenderness  Neurological:      Mental Status: She is alert and oriented to person, place, and time  Skin:     General: Skin is warm and dry     Psychiatric:         Behavior: Behavior normal

## 2022-04-15 ENCOUNTER — OFFICE VISIT (OUTPATIENT)
Dept: OBGYN CLINIC | Facility: CLINIC | Age: 27
End: 2022-04-15
Payer: COMMERCIAL

## 2022-04-15 VITALS
TEMPERATURE: 98.1 F | BODY MASS INDEX: 25.29 KG/M2 | DIASTOLIC BLOOD PRESSURE: 64 MMHG | HEART RATE: 81 BPM | SYSTOLIC BLOOD PRESSURE: 116 MMHG | HEIGHT: 60 IN | WEIGHT: 128.8 LBS

## 2022-04-15 DIAGNOSIS — B97.7 HIGH RISK HPV INFECTION: ICD-10-CM

## 2022-04-15 DIAGNOSIS — N87.1 DYSPLASIA OF CERVIX, HIGH GRADE CIN 2: Primary | ICD-10-CM

## 2022-04-15 PROCEDURE — 3008F BODY MASS INDEX DOCD: CPT | Performed by: OBSTETRICS & GYNECOLOGY

## 2022-04-15 PROCEDURE — 1036F TOBACCO NON-USER: CPT | Performed by: OBSTETRICS & GYNECOLOGY

## 2022-04-15 PROCEDURE — G0476 HPV COMBO ASSAY CA SCREEN: HCPCS | Performed by: OBSTETRICS & GYNECOLOGY

## 2022-04-15 PROCEDURE — 99213 OFFICE O/P EST LOW 20 MIN: CPT | Performed by: OBSTETRICS & GYNECOLOGY

## 2022-04-15 PROCEDURE — G0145 SCR C/V CYTO,THINLAYER,RESCR: HCPCS | Performed by: OBSTETRICS & GYNECOLOGY

## 2022-04-18 LAB
HPV HR 12 DNA CVX QL NAA+PROBE: NEGATIVE
HPV16 DNA CVX QL NAA+PROBE: NEGATIVE
HPV18 DNA CVX QL NAA+PROBE: NEGATIVE

## 2022-04-21 LAB
LAB AP GYN PRIMARY INTERPRETATION: NORMAL
Lab: NORMAL
PATH INTERP SPEC-IMP: NORMAL

## 2022-04-25 ENCOUNTER — TELEPHONE (OUTPATIENT)
Dept: GYNECOLOGY | Facility: CLINIC | Age: 27
End: 2022-04-25

## 2022-04-25 DIAGNOSIS — B96.89 BV (BACTERIAL VAGINOSIS): Primary | ICD-10-CM

## 2022-04-25 DIAGNOSIS — N76.0 BV (BACTERIAL VAGINOSIS): Primary | ICD-10-CM

## 2022-04-25 RX ORDER — METRONIDAZOLE 500 MG/1
500 TABLET ORAL EVERY 12 HOURS SCHEDULED
Qty: 14 TABLET | Refills: 0 | Status: SHIPPED | OUTPATIENT
Start: 2022-04-25 | End: 2022-05-02

## 2022-04-25 RX ORDER — METRONIDAZOLE 500 MG/1
500 TABLET ORAL EVERY 12 HOURS SCHEDULED
Qty: 14 TABLET | Refills: 0 | Status: SHIPPED | OUTPATIENT
Start: 2022-04-25 | End: 2022-04-25 | Stop reason: SDUPTHER

## 2022-04-25 NOTE — TELEPHONE ENCOUNTER
----- Message from Marialuisa Hoover sent at 4/25/2022 12:03 PM EDT -----  Please send antibiotics to [facility] in [address] [facility]

## 2022-05-11 DIAGNOSIS — Z30.41 ENCOUNTER FOR BIRTH CONTROL PILLS MAINTENANCE: ICD-10-CM

## 2022-05-11 RX ORDER — DROSPIRENONE, ETHINYL ESTRADIOL AND LEVOMEFOLATE CALCIUM AND LEVOMEFOLATE CALCIUM 3-0.02(24)
1 KIT ORAL DAILY
Qty: 30 TABLET | Refills: 0 | Status: SHIPPED | OUTPATIENT
Start: 2022-05-11

## 2022-05-11 NOTE — TELEPHONE ENCOUNTER
Pt is home from college and forgot her BC at her dorm, wants to know if she can have a month supply sent into her pharmacy

## 2022-08-12 ENCOUNTER — OFFICE VISIT (OUTPATIENT)
Dept: OBGYN CLINIC | Facility: CLINIC | Age: 27
End: 2022-08-12
Payer: COMMERCIAL

## 2022-08-12 VITALS
HEIGHT: 60 IN | BODY MASS INDEX: 25.13 KG/M2 | SYSTOLIC BLOOD PRESSURE: 98 MMHG | HEART RATE: 94 BPM | DIASTOLIC BLOOD PRESSURE: 62 MMHG | WEIGHT: 128 LBS | TEMPERATURE: 97.9 F

## 2022-08-12 DIAGNOSIS — Z30.09 ENCOUNTER FOR COUNSELING REGARDING CONTRACEPTION: ICD-10-CM

## 2022-08-12 DIAGNOSIS — B96.89 BV (BACTERIAL VAGINOSIS): ICD-10-CM

## 2022-08-12 DIAGNOSIS — R10.2 PELVIC PAIN: ICD-10-CM

## 2022-08-12 DIAGNOSIS — N76.0 BV (BACTERIAL VAGINOSIS): ICD-10-CM

## 2022-08-12 DIAGNOSIS — N89.8 LEUKORRHEA: Primary | ICD-10-CM

## 2022-08-12 DIAGNOSIS — Z11.3 SCREENING FOR STD (SEXUALLY TRANSMITTED DISEASE): ICD-10-CM

## 2022-08-12 PROBLEM — B97.7 HIGH RISK HPV INFECTION: Status: RESOLVED | Noted: 2020-08-12 | Resolved: 2022-08-12

## 2022-08-12 PROBLEM — B97.7 HIGH RISK HUMAN PAPILLOMA VIRUS (HPV) INFECTION OF CERVIX: Status: RESOLVED | Noted: 2020-09-22 | Resolved: 2022-08-12

## 2022-08-12 PROBLEM — Z98.890 S/P LEEP: Status: RESOLVED | Noted: 2021-09-28 | Resolved: 2022-08-12

## 2022-08-12 PROBLEM — N72 HIGH RISK HUMAN PAPILLOMA VIRUS (HPV) INFECTION OF CERVIX: Status: RESOLVED | Noted: 2020-09-22 | Resolved: 2022-08-12

## 2022-08-12 LAB
BV WHIFF TEST VAG QL: ABNORMAL
CLUE CELLS SPEC QL WET PREP: ABNORMAL
PH SMN: 5 [PH]
SL AMB POCT WET MOUNT: ABNORMAL
T VAGINALIS VAG QL WET PREP: NEGATIVE
YEAST VAG QL WET PREP: ABNORMAL

## 2022-08-12 PROCEDURE — 99214 OFFICE O/P EST MOD 30 MIN: CPT | Performed by: CLINICAL NURSE SPECIALIST

## 2022-08-12 PROCEDURE — 87491 CHLMYD TRACH DNA AMP PROBE: CPT | Performed by: CLINICAL NURSE SPECIALIST

## 2022-08-12 PROCEDURE — 87591 N.GONORRHOEAE DNA AMP PROB: CPT | Performed by: CLINICAL NURSE SPECIALIST

## 2022-08-12 PROCEDURE — 87210 SMEAR WET MOUNT SALINE/INK: CPT | Performed by: CLINICAL NURSE SPECIALIST

## 2022-08-12 RX ORDER — METRONIDAZOLE 500 MG/1
500 TABLET ORAL 2 TIMES DAILY
Qty: 14 TABLET | Refills: 0 | Status: SHIPPED | OUTPATIENT
Start: 2022-08-12 | End: 2022-08-19

## 2022-08-12 NOTE — ASSESSMENT & PLAN NOTE
Exam and Wet mount meeting Amsels Criteria for BV  Discussed tx options including oral and vaginal and prefers oral option  Advised against alcohol intake while taking due to interaction causing excess vomitting  Also discussed preventive measures including always wiping from front to back, wear only cotton underwear, use unscented soaps and detergent, do not use dryer sheets in laundry with underwear, do not douche and avoidance of tight fitting pants   Education handout given

## 2022-08-12 NOTE — ASSESSMENT & PLAN NOTE
Pt c/o mild intermittent right lower quadrant pelvic pain  Exam today with mild tenderness over area but no palpable mass noted  She does concurrently had BV  GC/CT collected to rule out infection, and ultrasound ordered to assess anatomy  Low suspicion for pathology    Likely musculoskeletal

## 2022-08-12 NOTE — PROGRESS NOTES
Assessment/Plan:       1  Leukorrhea  -     POCT wet mount  -     Chlamydia/GC amplified DNA by PCR    2  BV (bacterial vaginosis)  Assessment & Plan:  Exam and Wet mount meeting Amsels Criteria for BV  Discussed tx options including oral and vaginal and prefers oral option  Advised against alcohol intake while taking due to interaction causing excess vomitting  Also discussed preventive measures including always wiping from front to back, wear only cotton underwear, use unscented soaps and detergent, do not use dryer sheets in laundry with underwear, do not douche and avoidance of tight fitting pants  Education handout given      Orders:  -     metroNIDAZOLE (FLAGYL) 500 mg tablet; Take 1 tablet (500 mg total) by mouth 2 (two) times a day for 7 days    3  Pelvic pain  Assessment & Plan:  Pt c/o mild intermittent right lower quadrant pelvic pain  Exam today with mild tenderness over area but no palpable mass noted  She does concurrently had BV  GC/CT collected to rule out infection, and ultrasound ordered to assess anatomy  Low suspicion for pathology  Likely musculoskeletal    Orders:  -     US pelvis complete w transvaginal; Future; Expected date: 08/12/2022    4  Screening for STD (sexually transmitted disease)  -     Chlamydia/GC amplified DNA by PCR    5  Encounter for counseling regarding contraception  Comments:  Stopped OCP due to ins  no longer covering  Pt made aware that could presc  same OCP w/o the levomefol which is likely why not covered  Will consider          Subjective:      Patient ID: Javad Farah is a 33 y o  female  She is here for Vaginal Discharge (Patient with c/o increased discharge (milky white), has vaginal odor, did try OPC meds w/ relief  Does state when she sneezes, coughs, or moves the wrong way she will feel a stabbing pain on her right side   States it doesn't happen all of the time but is more noticeable now )    HPI  Pt here with vaginal c/o  Increased d/c that is described as a milky white in color  Present x 1 month  Self treated for yeast infection with OTC products with no relief  Itch/irritation-denies  Vaginal Malodor- yes  Urinary sxs- denies    She is sexually active in general, but hasn't had sex in several months  Contraception- stopped OCP  Due to insurance coverage     She is also noticing some increased pelvic pain with sudden movements  Intermittent, but more noticeable with cough, sneeze etc   This is on the right side and has been present for several months, but more noticeable in the past month or so    Menstrual History:  Patient's last menstrual period was 07/16/2022 (exact date)  The following portions of the patient's history were reviewed and updated as appropriate: allergies, current medications, past family history, past medical history, past social history, past surgical history, and problem list     Review of Systems  See HPI for pertinent positives          Objective:    BP 98/62 (BP Location: Right arm, Patient Position: Sitting, Cuff Size: Adult)   Pulse 94   Temp 97 9 °F (36 6 °C) (Tympanic)   Ht 5' (1 524 m)   Wt 58 1 kg (128 lb)   LMP 07/16/2022 (Exact Date)   BMI 25 00 kg/m²      Physical Exam  Constitutional:       General: She is not in acute distress  Appearance: Normal appearance  Genitourinary:      Urethral meatus normal       No lesions in the vagina  Right Labia: No rash, lesions or skin changes  Left Labia: No lesions, skin changes or rash  Vaginal discharge (large amt thinner homogenous malodorous light yellow d/c  coating vaginal walls/cervix) and tenderness present  No vaginal erythema, bleeding or ulceration  Right Adnexa: tender (MIld)  Right Adnexa: not full and no mass present  Left Adnexa: not tender, not full and no mass present  No cervical motion tenderness, discharge, friability or lesion  Uterus is not enlarged or tender  Bladder is not tender         Pelvic exam was performed with patient in the lithotomy position  Rectum:      No external hemorrhoid  HENT:      Head: Normocephalic  Cardiovascular:      Rate and Rhythm: Normal rate  Pulmonary:      Effort: Pulmonary effort is normal    Neurological:      Mental Status: She is alert and oriented to person, place, and time  Psychiatric:         Mood and Affect: Mood normal          Behavior: Behavior normal    Vitals reviewed         Results for orders placed or performed in visit on 08/12/22   POCT wet mount   Result Value Ref Range    WET MOUNT trace WBC     Yeast, Wet Prep Neg     pH 5     Whiff Test Pos     Clue Cells Pos     Trich, Wet Prep Negative

## 2022-08-12 NOTE — PATIENT INSTRUCTIONS
Vaginosis/Vaginitis Education/tips to prevent recurrence    Yeast infection, or vulvovaginal candidiasis, is a common vaginal infection  Vulvovaginal candidiasis is caused by a fungus, or yeast-like germ  Fungi are normally found in your vagina  Too many fungi can cause an infection  Bacterial vaginosis  is an infection in the vagina  It may cause vaginitis (irritation and inflammation of the vagina)  The cause is not known  Bacteria normally found in the vagina are imbalanced  Common Symptoms/Comparison:  Symptoms Yeast BV   Vaginal discharge Thick/white/clumpy (cottage cheese like) Thin/gray, white, yellow   Irritation Yes- vaginal itching, swelling, redness Less common-itch/irritation outside vagina   Odor No Yes (Fishy)   Burning w/ urination Sometimes No     Treatment- Antibiotics  are given to kill the bacteria/decrease count of bacteria/fungi  They may be given as a pill or as a cream to put in your vagina and varies based on the type of infection  Symptomatic infections can and should be treated during pregnancy     After treatment: Call your doctor or gynecologist if:   Your symptoms come back or do not improve with treatment  You have vaginal bleeding that is not your monthly period  You have questions or concerns about your condition or care  Prevention:   Do not have sex until your symptoms go away  Have your partner wear a condom until you complete your course of medication  Clean around your vulva with mild soap and warm water each day  Gently dry the area after washing  Do not use hot tubs  The heat and moisture from hot tubs can increase your risk for another yeast infection  Keep your vaginal area clean and dry  Wear underwear with a cotton crotch  Wipe from front to back after you urinate or have a bowel movement  After you bathe, rinse soap from your vaginal area to decrease your risk for irritation  Do not use products that cause irritation    Always use unscented tampons or sanitary pads  Do not use feminine sprays, powders, or scented tampons  They may cause irritation and increase your risk for vaginosis  Detergents and fabric softeners may also cause irritation and should be perfume free and Dye free  Do not use a douche  This can cause an imbalance in healthy vaginal bacteria  Use latex condoms during sex  This helps prevent another infection, or other STD  Always wipe from front to back  after you use the toilet  This prevents spreading bacteria from your rectal area into your vagina  Do not wear tight-fitting clothes or undergarments  for long periods  Wear cotton underwear during the day  Cotton helps keep your genital area dry and does not hold in warmth or moisture  Do not wear underwear at night

## 2022-08-13 LAB
C TRACH DNA SPEC QL NAA+PROBE: NEGATIVE
N GONORRHOEA DNA SPEC QL NAA+PROBE: NEGATIVE

## 2022-09-25 DIAGNOSIS — E03.9 HYPOTHYROIDISM, UNSPECIFIED TYPE: ICD-10-CM

## 2022-09-25 DIAGNOSIS — F41.9 ANXIETY: ICD-10-CM

## 2022-09-26 RX ORDER — LEVOTHYROXINE SODIUM 0.03 MG/1
TABLET ORAL
Qty: 90 TABLET | Refills: 1 | Status: SHIPPED | OUTPATIENT
Start: 2022-09-26

## 2022-09-26 RX ORDER — ESCITALOPRAM OXALATE 10 MG/1
TABLET ORAL
Qty: 90 TABLET | Refills: 1 | Status: SHIPPED | OUTPATIENT
Start: 2022-09-26

## 2022-09-26 NOTE — TELEPHONE ENCOUNTER
Requested Prescriptions     Pending Prescriptions Disp Refills    escitalopram (LEXAPRO) 10 mg tablet [Pharmacy Med Name: ESCITALOPRAM 10 MG TABLET] 90 tablet 1     Sig: TAKE 1 TABLET BY MOUTH EVERY DAY    levothyroxine 25 mcg tablet [Pharmacy Med Name: LEVOTHYROXINE 25 MCG TABLET] 90 tablet 1     Sig: TAKE 1 TABLET BY MOUTH EVERY DAY     LOV 8/12/22, F/U non scheduled, Labs complete

## 2022-10-08 NOTE — PROGRESS NOTES
Assessment/Plan:  Problem List Items Addressed This Visit        Genitourinary    Dysmenorrhea       Other    Pelvic pain    Relevant Orders    US pelvis complete w transvaginal      Other Visit Diagnoses     Encounter for BCP (birth control pills) initial prescription    -  Primary    Relevant Medications    drospirenone-ethinyl estradiol (ARNULFO) 3-0 02 MG per tablet    Dyspareunia in female        Relevant Orders    US pelvis complete w transvaginal    Screening examination for STD (sexually transmitted disease)        Relevant Orders    Chlamydia/GC amplified DNA by PCR    Vaginal irritation        Relevant Orders    POCT wet mount (Completed)    Chlamydia/GC amplified DNA by PCR          Patient with history of ovarian cysts  Repeat pelvic ultrasound was ordered to rule out ovarian cysts  Patient stated that her ultrasound was out of network at [facility] and I advised that she may obtain this at another Network and we will call her with results  Also discussed with patient other differential diagnosis including endometriosis  Patient previously did well on BEYAZ  Advised starting generic Arnulfo to help with painful periods, acne  Discussed possible need for diagnostic laparoscopy if pain continues or medical treatment with OCPs fails and if definitive diagnosis of endometriosis is desired  Advised lubricants with intercourse  Discussed with patient other potential interventions such as pelvic floor physical therapy  STD screening for gonorrhea and chlamydia was performed and ordered today  We will call her with results  No signs of vaginitis on exam    Subjective   Patient ID: Rober Serrato is a 33 y o  female  Patient is here for a problem visit  Chief Complaint   Patient presents with   • Gynecology Problem     Pain around ovaries still, describes it as a stabbing pain and painful with intercourse and itching the last couple days and inside  Every time she  has intercourse       Patient with history of ovarian cysts and part of the reason she was on birth control  She was on [place] but stopped due to insurance reasons  She had regular, non painful periods on birth control  She stopped birth control in May  1st period was in August, that was very painful and heavy  Next period was 20 days late  She is sexually active, new partner once  She did  Use condoms  She had some stabbing pain with intercourse or if she "moves wrong"  She has some vaginal irritation  She has no discharge or odor  She states after sex she has some itching  She has no dysuria, frequency or hematuria  She also states that acne recurred  Patient reports deep penetration      Menstrual History:  OB History        0    Para   0    Term   0       0    AB   0    Living   0       SAB   0    IAB   0    Ectopic   0    Multiple   0    Live Births   0                Menarche age: 16   Patient's last menstrual period was 10/09/2022 (exact date)  Past Medical History:   Diagnosis Date   • Anxiety    • Claustrophobia    • Depression    • Disease of thyroid gland    • Gastric paresis    • GERD (gastroesophageal reflux disease)    • Irritable bowel syndrome    • Migraines    • Motion sickness    • Seasonal allergies    • Wears glasses        Past Surgical History:   Procedure Laterality Date   • COLONOSCOPY     • MS CONIZATION CERVIX,LOOP ELECTRD N/A 2021    Procedure: BIOPSY LEEP CERVIX;  Surgeon: Yael Alvarado MD;  Location: [facility];  Service: Gynecology   • MS ESOPHAGOGASTRODUODENOSCOPY TRANSORAL DIAGNOSTIC N/A 3/15/2018    Procedure: ESOPHAGOGASTRODUODENOSCOPY (EGD); Surgeon: Haydee De La Fuente MD;  [facility];   Service: Gastroenterology   • PYLOROPLASTY     • TONSILLECTOMY         Social History     Tobacco Use   • Smoking status: Never Smoker   • Smokeless tobacco: Never Used   Vaping Use   • Vaping Use: Never used   Substance Use Topics   • Alcohol use: Not Currently     Comment: rare    • Drug use: No        Allergies   Allergen Reactions   • Compazine [Prochlorperazine] Anaphylaxis   • Penicillins Hives         Current Outpatient Medications:   •  cetirizine (ZyrTEC) 10 mg tablet, Take 10 mg by mouth daily, Disp: , Rfl:   •  drospirenone-ethinyl estradiol (ARNULFO) 3-0 02 MG per tablet, Take 1 tablet by mouth daily, Disp: 28 tablet, Rfl: 11  •  escitalopram (LEXAPRO) 10 mg tablet, TAKE 1 TABLET BY MOUTH EVERY DAY, Disp: 90 tablet, Rfl: 1  •  levothyroxine 25 mcg tablet, TAKE 1 TABLET BY MOUTH EVERY DAY, Disp: 90 tablet, Rfl: 1  •  ondansetron (ZOFRAN) 4 mg tablet, Take 1 tablet (4 mg total) by mouth every 6 (six) hours, Disp: 20 tablet, Rfl: 0  •  famotidine (PEPCID) 20 mg tablet, Take 1 tablet (20 mg total) by mouth 2 (two) times a day (Patient not taking: No sig reported), Disp: 30 tablet, Rfl: 0  •  traZODone (DESYREL) 50 mg tablet, Take 1 tablet (50 mg total) by mouth daily at bedtime (Patient not taking: No sig reported), Disp: 30 tablet, Rfl: 5      Review of Systems   Constitutional: Negative  HENT: Negative  Eyes: Negative  Respiratory: Negative  Cardiovascular: Negative  Gastrointestinal: Negative  Endocrine: Negative  Genitourinary:        As noted in HPI   Musculoskeletal: Negative  Skin: Negative  Allergic/Immunologic: Negative  Neurological: Negative  Hematological: Negative  Psychiatric/Behavioral: Negative  /70 (BP Location: Right arm, Patient Position: Sitting, Cuff Size: Adult)   Pulse 72   Wt 59 kg (130 lb)   LMP 10/09/2022 (Exact Date)   BMI 25 39 kg/m²       Physical Exam  Constitutional:       General: She is not in acute distress  Appearance: She is well-developed  Genitourinary:      Bladder and urethral meatus normal       No lesions in the vagina  Right Labia: No rash, tenderness, lesions, skin changes or Bartholin's cyst      Left Labia: No tenderness, lesions, skin changes, Bartholin's cyst or rash       Vaginal bleeding present  No vaginal discharge, erythema or tenderness  No vaginal prolapse present  No vaginal atrophy present  Right Adnexa: not tender, not full and no mass present  Left Adnexa: not tender, not full and no mass present  No cervical polyp  Uterus is not enlarged or tender  No uterine mass detected  Pelvic exam was performed with patient in the lithotomy position  Rectum:      No tenderness  Cardiovascular:      Rate and Rhythm: Normal rate  Pulmonary:      Effort: Pulmonary effort is normal    Abdominal:      General: There is no distension  Palpations: Abdomen is soft  Tenderness: There is no abdominal tenderness  Neurological:      Mental Status: She is alert and oriented to person, place, and time  Skin:     General: Skin is warm and dry  Psychiatric:         Behavior: Behavior normal                I have spent 30 minutes on day of encounter, time spent involved pre-charting, review of previous records, face to face encounter, counseling and post visit documentation        Future Appointments   Date Time Provider Stanford Szymanski   11/23/2022  1:00 PM Keysha Bucio MD [facility]

## 2022-10-10 ENCOUNTER — OFFICE VISIT (OUTPATIENT)
Dept: OBGYN CLINIC | Facility: CLINIC | Age: 27
End: 2022-10-10
Payer: COMMERCIAL

## 2022-10-10 VITALS
WEIGHT: 130 LBS | HEART RATE: 72 BPM | SYSTOLIC BLOOD PRESSURE: 108 MMHG | DIASTOLIC BLOOD PRESSURE: 70 MMHG | BODY MASS INDEX: 25.39 KG/M2

## 2022-10-10 DIAGNOSIS — N89.8 VAGINAL IRRITATION: ICD-10-CM

## 2022-10-10 DIAGNOSIS — N94.10 DYSPAREUNIA IN FEMALE: ICD-10-CM

## 2022-10-10 DIAGNOSIS — Z30.011 ENCOUNTER FOR BCP (BIRTH CONTROL PILLS) INITIAL PRESCRIPTION: Primary | ICD-10-CM

## 2022-10-10 DIAGNOSIS — Z11.3 SCREENING EXAMINATION FOR STD (SEXUALLY TRANSMITTED DISEASE): ICD-10-CM

## 2022-10-10 DIAGNOSIS — N94.6 DYSMENORRHEA: ICD-10-CM

## 2022-10-10 DIAGNOSIS — R10.2 PELVIC PAIN: ICD-10-CM

## 2022-10-10 PROCEDURE — 99214 OFFICE O/P EST MOD 30 MIN: CPT | Performed by: OBSTETRICS & GYNECOLOGY

## 2022-10-10 PROCEDURE — 87491 CHLMYD TRACH DNA AMP PROBE: CPT | Performed by: OBSTETRICS & GYNECOLOGY

## 2022-10-10 PROCEDURE — 87591 N.GONORRHOEAE DNA AMP PROB: CPT | Performed by: OBSTETRICS & GYNECOLOGY

## 2022-10-10 PROCEDURE — 87210 SMEAR WET MOUNT SALINE/INK: CPT | Performed by: OBSTETRICS & GYNECOLOGY

## 2022-10-10 RX ORDER — DROSPIRENONE AND ETHINYL ESTRADIOL 0.02-3(28)
1 KIT ORAL DAILY
Qty: 28 TABLET | Refills: 11 | Status: SHIPPED | OUTPATIENT
Start: 2022-10-10

## 2022-10-11 LAB
C TRACH DNA SPEC QL NAA+PROBE: NEGATIVE
N GONORRHOEA DNA SPEC QL NAA+PROBE: NEGATIVE

## 2022-11-18 ENCOUNTER — TELEPHONE (OUTPATIENT)
Dept: OBGYN CLINIC | Facility: CLINIC | Age: 27
End: 2022-11-18

## 2022-11-18 NOTE — TELEPHONE ENCOUNTER
The patient called regarding her US that she had done on 11/2/22 out of network  There is a result in her chart  Please advise

## 2022-12-19 ENCOUNTER — ANNUAL EXAM (OUTPATIENT)
Dept: OBGYN CLINIC | Facility: CLINIC | Age: 27
End: 2022-12-19

## 2022-12-19 VITALS
WEIGHT: 130.6 LBS | DIASTOLIC BLOOD PRESSURE: 60 MMHG | SYSTOLIC BLOOD PRESSURE: 100 MMHG | BODY MASS INDEX: 25.51 KG/M2 | HEART RATE: 78 BPM | TEMPERATURE: 97.5 F

## 2022-12-19 DIAGNOSIS — Z01.419 ENCNTR FOR GYN EXAM (GENERAL) (ROUTINE) W/O ABN FINDINGS: Primary | ICD-10-CM

## 2022-12-19 DIAGNOSIS — B37.31 YEAST VAGINITIS: ICD-10-CM

## 2022-12-19 DIAGNOSIS — Z12.4 CERVICAL CANCER SCREENING: ICD-10-CM

## 2022-12-19 DIAGNOSIS — Z87.42 HISTORY OF OVARIAN CYST: ICD-10-CM

## 2022-12-19 DIAGNOSIS — N89.8 VAGINAL ITCHING: ICD-10-CM

## 2022-12-19 DIAGNOSIS — N87.1 DYSPLASIA OF CERVIX, HIGH GRADE CIN 2: ICD-10-CM

## 2022-12-19 DIAGNOSIS — Z30.011 ENCOUNTER FOR BCP (BIRTH CONTROL PILLS) INITIAL PRESCRIPTION: ICD-10-CM

## 2022-12-19 DIAGNOSIS — B37.9 YEAST INFECTION: ICD-10-CM

## 2022-12-19 DIAGNOSIS — Z98.890 STATUS POST LEEP (LOOP ELECTROSURGICAL EXCISION PROCEDURE) OF CERVIX: ICD-10-CM

## 2022-12-19 PROBLEM — B96.89 BV (BACTERIAL VAGINOSIS): Status: RESOLVED | Noted: 2022-08-12 | Resolved: 2022-12-19

## 2022-12-19 PROBLEM — N76.0 BV (BACTERIAL VAGINOSIS): Status: RESOLVED | Noted: 2022-08-12 | Resolved: 2022-12-19

## 2022-12-19 LAB
BV WHIFF TEST VAG QL: ABNORMAL
CLUE CELLS SPEC QL WET PREP: ABNORMAL
PH SMN: 4.5 [PH]
T VAGINALIS VAG QL WET PREP: ABNORMAL
YEAST VAG QL WET PREP: ABNORMAL

## 2022-12-19 RX ORDER — FLUCONAZOLE 150 MG/1
150 TABLET ORAL
Qty: 3 TABLET | Refills: 0 | Status: SHIPPED | OUTPATIENT
Start: 2022-12-19 | End: 2022-12-26

## 2022-12-19 RX ORDER — DROSPIRENONE AND ETHINYL ESTRADIOL 0.02-3(28)
1 KIT ORAL DAILY
Qty: 84 TABLET | Refills: 4 | Status: SHIPPED | OUTPATIENT
Start: 2022-12-19

## 2022-12-19 NOTE — PATIENT INSTRUCTIONS
Wellness Visit for Adults   AMBULATORY CARE:   A wellness visit  is when you see your healthcare provider to get screened for health problems  Your healthcare provider will also give you advice on how to stay healthy  Write down your questions so you remember to ask them  Ask your healthcare provider how often you should have a wellness visit  What happens at a wellness visit:  Your healthcare provider will ask about your health, and your family history of health problems  This includes high blood pressure, heart disease, and cancer  He or she will ask if you have symptoms that concern you, if you smoke, and about your mood  You may also be asked about your intake of medicines, supplements, food, and alcohol  Any of the following may be done: Your weight  will be checked  Your height may also be checked so your body mass index (BMI) can be calculated  Your BMI shows if you are at a healthy weight  Your blood pressure  and heart rate will be checked  Your temperature may also be checked  Blood and urine tests  may be done  Blood tests may be done to check your cholesterol levels  Abnormal cholesterol levels increase your risk for heart disease and stroke  You may also need a blood or urine test to check for diabetes if you are at increased risk  Urine tests may be done to look for signs of an infection or kidney disease  A physical exam  includes checking your heartbeat and lungs with a stethoscope  Your healthcare provider may also check your skin to look for sun damage  Screening tests  may be recommended  A screening test is done to check for diseases that may not cause symptoms  The screening tests you may need depend on your age, gender, family history, and lifestyle habits  For example, colorectal screening may be recommended if you are 48years old or older  Screening tests you need if you are a woman:   A Pap smear  is used to screen for cervical cancer   Pap smears are usually done every 3 to 5 years depending on your age  You may need them more often if you have had abnormal Pap smear test results in the past  Ask your healthcare provider how often you should have a Pap smear  A mammogram  is an x-ray of your breasts to screen for breast cancer  Experts recommend mammograms every 2 years starting at age 48 years  You may need a mammogram at age 52 years or younger if you have an increased risk for breast cancer  Talk to your healthcare provider about when you should start having mammograms and how often you need them  Vaccines you may need:   Get an influenza vaccine  every year  The influenza vaccine protects you from the flu  Several types of viruses cause the flu  The viruses change over time, so new vaccines are made each year  Get a tetanus-diphtheria (Td) booster vaccine  every 10 years  This vaccine protects you against tetanus and diphtheria  Tetanus is a severe infection that may cause painful muscle spasms and lockjaw  Diphtheria is a severe bacterial infection that causes a thick covering in the back of your mouth and throat  Get a human papillomavirus (HPV) vaccine  if you are female and aged 23 to 32 or male 23 to 24 and never received it  This vaccine protects you from HPV infection  HPV is the most common infection spread by sexual contact  HPV may also cause vaginal, penile, and anal cancers  Get a pneumococcal vaccine  if you are aged 72 years or older  The pneumococcal vaccine is an injection given to protect you from pneumococcal disease  Pneumococcal disease is an infection caused by pneumococcal bacteria  The infection may cause pneumonia, meningitis, or an ear infection  Get a shingles vaccine  if you are 60 or older, even if you have had shingles before  The shingles vaccine is an injection to protect you from the varicella-zoster virus  This is the same virus that causes chickenpox   Shingles is a painful rash that develops in people who had chickenpox or have been exposed to the virus  How to eat healthy:  My Plate is a model for planning healthy meals  It shows the types and amounts of foods that should go on your plate  Fruits and vegetables make up about half of your plate, and grains and protein make up the other half  A serving of dairy is included on the side of your plate  The amount of calories and serving sizes you need depends on your age, gender, weight, and height  Examples of healthy foods are listed below:  Eat a variety of vegetables  such as dark green, red, and orange vegetables  You can also include canned vegetables low in sodium (salt) and frozen vegetables without added butter or sauces  Eat a variety of fresh fruits , canned fruit in 100% juice, frozen fruit, and dried fruit  Include whole grains  At least half of the grains you eat should be whole grains  Examples include whole-wheat bread, wheat pasta, brown rice, and whole-grain cereals such as oatmeal     Eat a variety of protein foods such as seafood (fish and shellfish), lean meat, and poultry without skin (turkey and chicken)  Examples of lean meats include pork leg, shoulder, or tenderloin, and beef round, sirloin, tenderloin, and extra lean ground beef  Other protein foods include eggs and egg substitutes, beans, peas, soy products, nuts, and seeds  Choose low-fat dairy products such as skim or 1% milk or low-fat yogurt, cheese, and cottage cheese  Limit unhealthy fats  such as butter, hard margarine, and shortening  Exercise:  Exercise at least 30 minutes per day on most days of the week  Some examples of exercise include walking, biking, dancing, and swimming  You can also fit in more physical activity by taking the stairs instead of the elevator or parking farther away from stores  Include muscle strengthening activities 2 days each week  Regular exercise provides many health benefits   It helps you manage your weight, and decreases your risk for type 2 diabetes, heart disease, stroke, and high blood pressure  Exercise can also help improve your mood  Ask your healthcare provider about the best exercise plan for you  General health and safety guidelines:   Do not smoke  Nicotine and other chemicals in cigarettes and cigars can cause lung damage  Ask your healthcare provider for information if you currently smoke and need help to quit  E-cigarettes or smokeless tobacco still contain nicotine  Talk to your healthcare provider before you use these products  Limit alcohol  A drink of alcohol is 12 ounces of beer, 5 ounces of wine, or 1½ ounces of liquor  Lose weight, if needed  Being overweight increases your risk of certain health conditions  These include heart disease, high blood pressure, type 2 diabetes, and certain types of cancer  Protect your skin  Do not sunbathe or use tanning beds  Use sunscreen with a SPF 15 or higher  Apply sunscreen at least 15 minutes before you go outside  Reapply sunscreen every 2 hours  Wear protective clothing, hats, and sunglasses when you are outside  Drive safely  Always wear your seatbelt  Make sure everyone in your car wears a seatbelt  A seatbelt can save your life if you are in an accident  Do not use your cell phone when you are driving  This could distract you and cause an accident  Pull over if you need to make a call or send a text message  Practice safe sex  Use latex condoms if are sexually active and have more than one partner  Your healthcare provider may recommend screening tests for sexually transmitted infections (STIs)  Wear helmets, lifejackets, and protective gear  Always wear a helmet when you ride a bike or motorcycle, go skiing, or play sports that could cause a head injury  Wear protective equipment when you play sports  Wear a lifejacket when you are on a boat or doing water sports      © Copyright [employer] 2022 Information is for End User's use only and may not be sold, redistributed or otherwise used for commercial purposes  All illustrations and images included in CareNotes® are the copyrighted property of A D A M , Inc  or [employer]  The above information is an  only  It is not intended as medical advice for individual conditions or treatments  Talk to your doctor, nurse or pharmacist before following any medical regimen to see if it is safe and effective for you

## 2022-12-19 NOTE — PROGRESS NOTES
Assessment        Diagnoses and all orders for this visit:    Encntr for gyn exam (general) (routine) w/o abn findings    Dysplasia of cervix, high grade PIERO 2  -     Liquid-based pap, diagnostic    Cervical cancer screening  -     Liquid-based pap, diagnostic    Status post LEEP (loop electrosurgical excision procedure) of cervix  -     Liquid-based pap, diagnostic    Encounter for BCP (birth control pills) initial prescription  -     drospirenone-ethinyl estradiol (Vestura) 3-0 02 MG per tablet; Take 1 tablet by mouth daily    History of ovarian cyst  -     drospirenone-ethinyl estradiol (Vestura) 3-0 02 MG per tablet; Take 1 tablet by mouth daily    Vaginal itching  -     POCT wet mount    Yeast infection  -     POCT wet mount    Yeast vaginitis  -     fluconazole (DIFLUCAN) 150 mg tablet; Take 1 tablet (150 mg total) by mouth every 3 (three) days for 3 doses  -     POCT wet mount             Plan      All questions answered  Await pap smear results  Contraception: OCP (estrogen/progesterone)  Diagnosis explained in detail, including differential   Follow up in 1 year  Follow up as needed  KOH prep  Thin prep Pap smear  Wet prep  Candida Vaginitis      Candida vaginitis diagnosed on exam today based on symptoms and clinical findings  Discussed with patient diagnosis in detail including risk factors, signs and symptoms  Treatment was indicated for relief of symptoms and was prescribed antibiotics  Discussed with patient vulvar hygiene and avoidance of intercourse during treatment  Antibiotics were prescribed and patient advised to complete course of antibiotics  She was asked to call if with untoward side effects and if no relief of symptoms after treatment  Follow-up is unnecessary if symptoms resolve      Continue birth control pills for pelvic pain, recurrent ovarian cysts and birth control    Repeat Pap and HPV test performed due to history of PIERO-2, status post LEEP, colpo for any abnormalities        Chelle Katz is a 33 y o  female who presents for annual exam       Chief Complaint   Patient presents with   • Gynecologic Exam     Patient reports no concerns     Still some itching  She has some discharge, white    She has no new partner  She needs refills on Vestura  She states her pelvic pain improved once starting OCPS    H/o LEEP for PIERO 2 21      HPV vaccine completed:yes - 3 doses  Current contraception: OCP (estrogen/progesterone)  History of abnormal Pap smear: yes - HPV positive  History of abnormal mammogram: no  Family history of uterine or ovarian cancer: no  Family history of breast cancer: yes - mom at age 28  Family history of colon cancer: no         Menstrual History:  OB History        0    Para   0    Term   0       0    AB   0    Living   0       SAB   0    IAB   0    Ectopic   0    Multiple   0    Live Births   0                Menarche age: 16  Patient's last menstrual period was 2022  Past Medical History:   Diagnosis Date   • Anxiety    • Claustrophobia    • Depression    • Disease of thyroid gland    • Gastric paresis    • GERD (gastroesophageal reflux disease)    • Irritable bowel syndrome    • Migraines    • Motion sickness    • Seasonal allergies    • Wears glasses      Past Surgical History:   Procedure Laterality Date   • COLONOSCOPY     • NC CONIZATION CERVIX,LOOP ELECTRD N/A 2021    Procedure: BIOPSY LEEP CERVIX;  Surgeon: Werner Moses MD;  [facility];  Service: Gynecology   • NC ESOPHAGOGASTRODUODENOSCOPY TRANSORAL DIAGNOSTIC N/A 3/15/2018    Procedure: ESOPHAGOGASTRODUODENOSCOPY (EGD); Surgeon: Selin Al MD;  [facility];   Service: Gastroenterology   • PYLOROPLASTY     • TONSILLECTOMY       Family History   Problem Relation Age of Onset   • Lymphoma Mother    • Breast cancer Mother    • Other Father         heart problem   • No Known Problems Sister    • Other Maternal Grandmother         heart problems   • Hypertension Maternal Grandmother    • Hypertension Maternal Grandfather    • Alzheimer's disease Paternal Grandmother    • Dementia Paternal Grandmother    • Other Paternal Grandfather         heart problems  • Stroke Paternal Grandfather        Social History     Tobacco Use   • Smoking status: Never   • Smokeless tobacco: Never   Vaping Use   • Vaping Use: Never used   Substance Use Topics   • Alcohol use: Not Currently     Comment: rare    • Drug use: No          Current Outpatient Medications:   •  cetirizine (ZyrTEC) 10 mg tablet, Take 10 mg by mouth daily, Disp: , Rfl:   •  drospirenone-ethinyl estradiol (Vestura) 3-0 02 MG per tablet, Take 1 tablet by mouth daily, Disp: 84 tablet, Rfl: 4  •  escitalopram (LEXAPRO) 10 mg tablet, TAKE 1 TABLET BY MOUTH EVERY DAY, Disp: 90 tablet, Rfl: 1  •  fluconazole (DIFLUCAN) 150 mg tablet, Take 1 tablet (150 mg total) by mouth every 3 (three) days for 3 doses, Disp: 3 tablet, Rfl: 0  •  levothyroxine 25 mcg tablet, TAKE 1 TABLET BY MOUTH EVERY DAY, Disp: 90 tablet, Rfl: 1  •  ondansetron (ZOFRAN) 4 mg tablet, Take 1 tablet (4 mg total) by mouth every 6 (six) hours, Disp: 20 tablet, Rfl: 0  •  famotidine (PEPCID) 20 mg tablet, Take 1 tablet (20 mg total) by mouth 2 (two) times a day, Disp: 30 tablet, Rfl: 0  •  traZODone (DESYREL) 50 mg tablet, Take 1 tablet (50 mg total) by mouth daily at bedtime, Disp: 30 tablet, Rfl: 5    Allergies   Allergen Reactions   • Compazine [Prochlorperazine] Anaphylaxis   • Penicillins Hives           Review of Systems   Constitutional: Negative  HENT: Negative  Eyes: Negative  Respiratory: Negative  Cardiovascular: Negative  Gastrointestinal: Negative  Endocrine: Negative  Genitourinary:        As noted in HPI   Musculoskeletal: Negative  Skin: Negative  Allergic/Immunologic: Negative  Neurological: Negative  Hematological: Negative      Psychiatric/Behavioral: Negative  /60 (BP Location: Right arm, Patient Position: Sitting, Cuff Size: Adult)   Pulse 78   Temp 97 5 °F (36 4 °C) (Tympanic)   Wt 59 2 kg (130 lb 9 6 oz)   LMP 12/08/2022   BMI 25 51 kg/m²         Physical Exam  Constitutional:       Appearance: She is well-developed  Genitourinary:      Vulva, bladder and rectum normal       No lesions in the vagina  Genitourinary Comments:         Right Labia: No rash, tenderness, lesions, skin changes or Bartholin's cyst      Left Labia: No tenderness, lesions, skin changes, Bartholin's cyst or rash  No inguinal adenopathy present in the right or left side  Vaginal discharge present  No vaginal tenderness or bleeding  No vaginal prolapse present  No vaginal atrophy present  Right Adnexa: not tender, not full and no mass present  Left Adnexa: not tender, not full and no mass present  No cervical motion tenderness, friability, lesion or polyp  Uterus is not enlarged or tender  Pelvic exam was performed with patient in the lithotomy position  Rectum:      No external hemorrhoid  Breasts:     Right: No mass, nipple discharge, skin change or tenderness  Left: No mass, nipple discharge, skin change or tenderness  HENT:      Head: Normocephalic  Nose: Nose normal    Eyes:      Conjunctiva/sclera: Conjunctivae normal    Neck:      Thyroid: No thyromegaly  Cardiovascular:      Rate and Rhythm: Normal rate and regular rhythm  Heart sounds: Normal heart sounds  No murmur heard  Pulmonary:      Effort: Pulmonary effort is normal  No respiratory distress  Breath sounds: Normal breath sounds  No wheezing or rales  Abdominal:      General: There is no distension  Palpations: Abdomen is soft  There is no mass  Tenderness: There is no abdominal tenderness  There is no guarding or rebound  Musculoskeletal:         General: No tenderness  Cervical back: Neck supple   No muscular tenderness  Lymphadenopathy:      Cervical: No cervical adenopathy  Lower Body: No right inguinal adenopathy  No left inguinal adenopathy  Neurological:      Mental Status: She is alert and oriented to person, place, and time  Skin:     General: Skin is warm and dry  Psychiatric:         Mood and Affect: Mood normal          Behavior: Behavior normal              No future appointments

## 2022-12-23 LAB
LAB AP GYN PRIMARY INTERPRETATION: NORMAL
Lab: NORMAL
PATH INTERP SPEC-IMP: NORMAL

## 2023-04-05 DIAGNOSIS — F41.9 ANXIETY: ICD-10-CM

## 2023-04-05 DIAGNOSIS — E03.9 HYPOTHYROIDISM, UNSPECIFIED TYPE: ICD-10-CM

## 2023-04-05 RX ORDER — ESCITALOPRAM OXALATE 10 MG/1
TABLET ORAL
Qty: 90 TABLET | Refills: 1 | Status: SHIPPED | OUTPATIENT
Start: 2023-04-05

## 2023-04-05 RX ORDER — LEVOTHYROXINE SODIUM 0.03 MG/1
TABLET ORAL
Qty: 90 TABLET | Refills: 1 | Status: SHIPPED | OUTPATIENT
Start: 2023-04-05

## 2023-10-02 DIAGNOSIS — E03.9 HYPOTHYROIDISM, UNSPECIFIED TYPE: ICD-10-CM

## 2023-10-02 DIAGNOSIS — F41.9 ANXIETY: ICD-10-CM

## 2023-10-02 RX ORDER — ESCITALOPRAM OXALATE 10 MG/1
TABLET ORAL
Qty: 30 TABLET | Refills: 0 | Status: SHIPPED | OUTPATIENT
Start: 2023-10-02

## 2023-10-02 RX ORDER — LEVOTHYROXINE SODIUM 0.03 MG/1
TABLET ORAL
Qty: 30 TABLET | Refills: 0 | Status: SHIPPED | OUTPATIENT
Start: 2023-10-02

## 2023-10-27 DIAGNOSIS — E03.9 HYPOTHYROIDISM, UNSPECIFIED TYPE: ICD-10-CM

## 2023-10-27 DIAGNOSIS — F41.9 ANXIETY: ICD-10-CM

## 2023-10-27 RX ORDER — ESCITALOPRAM OXALATE 10 MG/1
TABLET ORAL
Qty: 90 TABLET | Refills: 1 | Status: SHIPPED | OUTPATIENT
Start: 2023-10-27

## 2023-10-27 RX ORDER — LEVOTHYROXINE SODIUM 0.03 MG/1
TABLET ORAL
Qty: 90 TABLET | Refills: 1 | Status: SHIPPED | OUTPATIENT
Start: 2023-10-27

## 2023-10-30 NOTE — PROCEDURES
ESOPHAGOGASTRODUODENOSCOPY    PROCEDURE: EGD    SEDATION: Monitored anesthesia care, check anesthesia records    ASA Class: 2    INDICATIONS: GERD; history of eosinophilic esophagitis    CONSENT:  Informed consent was obtained for the procedure, including sedation after explaining the risks and benefits of the procedure  Risks including but not limited to bleeding, perforation, infection, and missed lesion  PREPARATION:   Telemetry, pulse oximetry, blood pressure were monitored throughout the procedure  Patient was identified by myself both verbally and by visual inspection of ID band  DESCRIPTION:   Patient was placed in the left lateral decubitus position and was sedated with the above medication  The gastroscope was introduced in to the oropharynx and the esophagus was intubated under direct visualization  Scope was passed down the esophagus up to 2nd part of the duodenum  A careful inspection was made as the gastroscope was withdrawn, including a retroflexed view of the stomach; findings and interventions are described below  FINDINGS:    #1  Esophagus-normal   No evidence of hiatal hernia  Random biopsy performed from proximal mid esophagus due to previous history of eosinophilic esophagitis  #2  Stomach-mild erosive gastritis throughout the whole stomach status post biopsies of antrum and body of the stomach  Moderate amount of food found in the body of the stomach which was aspirated  #3  Duodenum-normal D1 D2 status post biopsies for celiac disease   IMPRESSIONS:      Mild erosive gastritis    RECOMMENDATIONS:     Follow-up biopsy results  Follow Dr Rafal Reid:  None; patient tolerated the procedure well            DISPOSITION: PACU           CONDITION: Stable Humira Pregnancy And Lactation Text: This medication is Pregnancy Category B and is considered safe during pregnancy. It is unknown if this medication is excreted in breast milk.

## 2023-12-24 DIAGNOSIS — Z87.42 HISTORY OF OVARIAN CYST: ICD-10-CM

## 2023-12-24 DIAGNOSIS — Z30.011 ENCOUNTER FOR BCP (BIRTH CONTROL PILLS) INITIAL PRESCRIPTION: ICD-10-CM

## 2023-12-24 RX ORDER — DROSPIRENONE AND ETHINYL ESTRADIOL 0.02-3(28)
1 KIT ORAL DAILY
Qty: 84 TABLET | Refills: 1 | Status: SHIPPED | OUTPATIENT
Start: 2023-12-24

## 2025-02-18 ENCOUNTER — TELEPHONE (OUTPATIENT)
Dept: FAMILY MEDICINE CLINIC | Facility: CLINIC | Age: 30
End: 2025-02-18

## 2025-02-18 NOTE — TELEPHONE ENCOUNTER
On 8/27/2024 patient established care with Mariangel Pak CRNP [address] [place] [address] [phone] (Work)   [phone] (Fax)

## 2025-02-25 NOTE — TELEPHONE ENCOUNTER
02/24/25 9:21 PM        The office's request has been received, reviewed, and the patient chart updated. The PCP has successfully been removed with a patient attribution note. This message will now be completed.        Thank you  Henri Waggoner

## 2025-04-07 ENCOUNTER — HOSPITAL ENCOUNTER (OUTPATIENT)
Dept: ULTRASOUND IMAGING | Facility: HOSPITAL | Age: 30
Discharge: HOME | End: 2025-04-07
Attending: OBSTETRICS & GYNECOLOGY
Payer: COMMERCIAL

## 2025-04-07 ENCOUNTER — OFFICE VISIT (OUTPATIENT)
Dept: OBGYN CLINIC | Facility: CLINIC | Age: 30
End: 2025-04-07
Payer: COMMERCIAL

## 2025-04-07 ENCOUNTER — APPOINTMENT (OUTPATIENT)
Dept: LAB | Facility: CLINIC | Age: 30
End: 2025-04-07
Payer: COMMERCIAL

## 2025-04-07 ENCOUNTER — RESULTS FOLLOW-UP (OUTPATIENT)
Dept: OBGYN CLINIC | Facility: CLINIC | Age: 30
End: 2025-04-07

## 2025-04-07 VITALS
HEART RATE: 74 BPM | WEIGHT: 137.6 LBS | BODY MASS INDEX: 26.87 KG/M2 | DIASTOLIC BLOOD PRESSURE: 74 MMHG | SYSTOLIC BLOOD PRESSURE: 106 MMHG

## 2025-04-07 DIAGNOSIS — K58.1 IRRITABLE BOWEL SYNDROME WITH CONSTIPATION: ICD-10-CM

## 2025-04-07 DIAGNOSIS — Z11.3 SCREENING EXAMINATION FOR STD (SEXUALLY TRANSMITTED DISEASE): ICD-10-CM

## 2025-04-07 DIAGNOSIS — K59.00 CONSTIPATION, UNSPECIFIED CONSTIPATION TYPE: ICD-10-CM

## 2025-04-07 DIAGNOSIS — N92.6 IRREGULAR PERIODS: ICD-10-CM

## 2025-04-07 DIAGNOSIS — Z30.011 ENCOUNTER FOR BCP (BIRTH CONTROL PILLS) INITIAL PRESCRIPTION: ICD-10-CM

## 2025-04-07 DIAGNOSIS — R10.2 PELVIC PAIN: ICD-10-CM

## 2025-04-07 DIAGNOSIS — K31.84 GASTROPARESIS: ICD-10-CM

## 2025-04-07 DIAGNOSIS — Z87.42 HISTORY OF OVARIAN CYST: ICD-10-CM

## 2025-04-07 DIAGNOSIS — N87.1 DYSPLASIA OF CERVIX, HIGH GRADE CIN 2: Primary | ICD-10-CM

## 2025-04-07 LAB
B-HCG SERPL-ACNC: <0.6 MIU/ML (ref 0–5)
FSH SERPL-ACNC: 2.2 MIU/ML
PROLACTIN SERPL-MCNC: 8.4 NG/ML (ref 3.34–26.72)
TSH SERPL DL<=0.05 MIU/L-ACNC: 1.89 UIU/ML (ref 0.45–4.5)

## 2025-04-07 PROCEDURE — 36415 COLL VENOUS BLD VENIPUNCTURE: CPT

## 2025-04-07 PROCEDURE — 83001 ASSAY OF GONADOTROPIN (FSH): CPT

## 2025-04-07 PROCEDURE — 84702 CHORIONIC GONADOTROPIN TEST: CPT

## 2025-04-07 PROCEDURE — 84146 ASSAY OF PROLACTIN: CPT

## 2025-04-07 PROCEDURE — 84402 ASSAY OF FREE TESTOSTERONE: CPT

## 2025-04-07 PROCEDURE — 87491 CHLMYD TRACH DNA AMP PROBE: CPT | Performed by: OBSTETRICS & GYNECOLOGY

## 2025-04-07 PROCEDURE — 76830 TRANSVAGINAL US NON-OB: CPT

## 2025-04-07 PROCEDURE — 99214 OFFICE O/P EST MOD 30 MIN: CPT | Performed by: OBSTETRICS & GYNECOLOGY

## 2025-04-07 PROCEDURE — 84443 ASSAY THYROID STIM HORMONE: CPT

## 2025-04-07 PROCEDURE — 87591 N.GONORRHOEAE DNA AMP PROB: CPT | Performed by: OBSTETRICS & GYNECOLOGY

## 2025-04-07 PROCEDURE — 82627 DEHYDROEPIANDROSTERONE: CPT

## 2025-04-07 PROCEDURE — G0476 HPV COMBO ASSAY CA SCREEN: HCPCS | Performed by: OBSTETRICS & GYNECOLOGY

## 2025-04-07 PROCEDURE — 76856 US EXAM PELVIC COMPLETE: CPT

## 2025-04-07 PROCEDURE — 84403 ASSAY OF TOTAL TESTOSTERONE: CPT

## 2025-04-07 PROCEDURE — G0145 SCR C/V CYTO,THINLAYER,RESCR: HCPCS | Performed by: OBSTETRICS & GYNECOLOGY

## 2025-04-07 RX ORDER — SERTRALINE HYDROCHLORIDE 25 MG/1
25 TABLET, FILM COATED ORAL DAILY
COMMUNITY

## 2025-04-07 RX ORDER — DROSPIRENONE AND ETHINYL ESTRADIOL 0.02-3(28)
1 KIT ORAL DAILY
Qty: 84 TABLET | Refills: 3 | Status: SHIPPED | OUTPATIENT
Start: 2025-04-07

## 2025-04-07 NOTE — PROGRESS NOTES
Assessment/Plan:  Problem List Items Addressed This Visit          Genitourinary    Dysplasia of cervix, high grade PIERO 2 - Primary    Relevant Orders    Liquid-based pap, screening       Surgery/Wound/Pain    Pelvic pain    Relevant Orders    Chlamydia/GC amplified DNA by PCR    US pelvis complete w transvaginal     Other Visit Diagnoses         Irregular periods        Relevant Orders    Follicle stimulating hormone    hCG, quantitative    DHEA-sulfate    Prolactin    Testosterone, free, total    TSH, 3rd generation with Free T4 reflex      Screening examination for STD (sexually transmitted disease)        Relevant Orders    Liquid-based pap, screening    Chlamydia/GC amplified DNA by PCR      History of ovarian cyst        Relevant Orders    US pelvis complete w transvaginal           She of PIERO-2 in 2021. She was advised yearly Paps for 3 years and then Paps every 3 years x 25 years.  Pap with HPV testing was performed today since her last Pap smear was in 2022.  GC chlamydia was also performed due to new partner.    With history of constipation.  Patient with continued constipation.  Referred to GI for irritable bowel, constipation as well.    Was ordered to rule out ovarian cyst due to patient's history.  Discussed with patient previously possible endometriosis and that this may only be diagnosed via laparoscopy.  We will call patient with pelvic ultrasound results.  Renewed patient's Vestura.  Follow-up in 1 year.      Subjective   Patient ID: Lesley Aguero is a 30 y.o. female.    Patient is here for a problem visit.    Chief Complaint   Patient presents with    Gynecologic Exam     Irregular periods     She has missed period in January and December.  She states prior to this she had regular periods.  She was getting severe pain in her pelvic area, cramping.  She is sexually active.  She has been with new partner since February.  She has pain with intercourse and feels like when she moves a certain way, she  "feels a \"pop\".    H/o ovarian cyst    H/o LEEP PIERO 2 with negative 21  Last PAP 22      She is on Vestura      Menstrual History:  OB History          0    Para   0    Term   0       0    AB   0    Living   0         SAB   0    IAB   0    Ectopic   0    Multiple   0    Live Births   0                Menarche age:   Patient's last menstrual period was 03/10/2025.         Past Medical History:   Diagnosis Date    Anxiety     Claustrophobia     Depression     Disease of thyroid gland     Gastric paresis     GERD (gastroesophageal reflux disease)     Irritable bowel syndrome     Migraines     Motion sickness     Seasonal allergies     Wears glasses        Past Surgical History:   Procedure Laterality Date    COLONOSCOPY      TN CONIZATION CERVIX W/WO D&C RPR ELTRD EXC N/A 2021    Procedure: BIOPSY LEEP CERVIX;  Surgeon: Ai Pace MD;  Location: [facility];  Service: Gynecology    TN ESOPHAGOGASTRODUODENOSCOPY TRANSORAL DIAGNOSTIC N/A 3/15/2018    Procedure: ESOPHAGOGASTRODUODENOSCOPY (EGD);  Surgeon: Ron Sadler MD;  Location: [facility];  Service: Gastroenterology    PYLOROPLASTY      TONSILLECTOMY         Social History     Tobacco Use    Smoking status: Never    Smokeless tobacco: Never   Vaping Use    Vaping status: Never Used   Substance Use Topics    Alcohol use: Not Currently     Comment: rare     Drug use: No        Allergies   Allergen Reactions    Compazine [Prochlorperazine] Anaphylaxis    Penicillins Hives         Current Outpatient Medications:     cetirizine (ZyrTEC) 10 mg tablet, Take 10 mg by mouth daily, Disp: , Rfl:     levothyroxine 25 mcg tablet, TAKE 1 TABLET BY MOUTH EVERY DAY, Disp: 90 tablet, Rfl: 1    ondansetron (ZOFRAN) 4 mg tablet, Take 1 tablet (4 mg total) by mouth every 6 (six) hours (Patient taking differently: Take 4 mg by mouth every 6 (six) hours As needed), Disp: 20 tablet, Rfl: 0    sertraline (ZOLOFT) 25 mg tablet, Take 25 mg by mouth " daily, Disp: , Rfl:     Vestura 3-0.02 MG per tablet, TAKE 1 TABLET BY MOUTH EVERY DAY, Disp: 84 tablet, Rfl: 1    escitalopram (LEXAPRO) 10 mg tablet, TAKE 1 TABLET BY MOUTH EVERY DAY (Patient not taking: Reported on 4/7/2025), Disp: 90 tablet, Rfl: 1    famotidine (PEPCID) 20 mg tablet, Take 1 tablet (20 mg total) by mouth 2 (two) times a day (Patient not taking: Reported on 4/7/2025), Disp: 30 tablet, Rfl: 0    traZODone (DESYREL) 50 mg tablet, Take 1 tablet (50 mg total) by mouth daily at bedtime (Patient not taking: Reported on 4/7/2025), Disp: 30 tablet, Rfl: 5      Pertinent laboratory testing, imaging studies and prior external records reviewed    Review of Systems   Constitutional: Negative.    HENT: Negative.     Eyes: Negative.    Respiratory: Negative.     Cardiovascular: Negative.    Gastrointestinal: Negative.    Endocrine: Negative.    Genitourinary:         As noted in HPI   Musculoskeletal: Negative.    Skin: Negative.    Allergic/Immunologic: Negative.    Neurological: Negative.    Hematological: Negative.    Psychiatric/Behavioral: Negative.           /74 (BP Location: Right arm, Patient Position: Sitting, Cuff Size: Standard)   Pulse 74   Wt 62.4 kg (137 lb 9.6 oz)   LMP 03/10/2025   BMI 26.87 kg/m²       Physical Exam  Constitutional:       General: She is not in acute distress.     Appearance: She is well-developed.   Genitourinary:      Bladder and urethral meatus normal.      No lesions in the vagina.      Right Labia: No rash, tenderness, lesions, skin changes or Bartholin's cyst.     Left Labia: No tenderness, lesions, skin changes, Bartholin's cyst or rash.     No vaginal discharge, erythema, tenderness or bleeding.      No vaginal prolapse present.     No vaginal atrophy present.       Right Adnexa: not tender, not full and no mass present.     Left Adnexa: not tender, not full and no mass present.     No cervical polyp.      Uterus is not enlarged or tender.      No uterine  mass detected.     Pelvic exam was performed with patient in the lithotomy position.   Rectum:      No tenderness.   Cardiovascular:      Rate and Rhythm: Normal rate.   Pulmonary:      Effort: Pulmonary effort is normal.   Abdominal:      General: There is no distension.      Palpations: Abdomen is soft.      Tenderness: There is no abdominal tenderness.   Neurological:      Mental Status: She is alert and oriented to person, place, and time.   Skin:     General: Skin is warm and dry.   Psychiatric:         Behavior: Behavior normal.       Hard Stool palpated in vaginal vault posteriorly          No future appointments.

## 2025-04-08 LAB
DHEA-S SERPL-MCNC: 107 UG/DL (ref 84.8–378)
HPV HR 12 DNA CVX QL NAA+PROBE: NEGATIVE
HPV16 DNA CVX QL NAA+PROBE: NEGATIVE
HPV18 DNA CVX QL NAA+PROBE: NEGATIVE
TESTOST FREE SERPL-MCNC: <0.2 PG/ML (ref 0–4.2)
TESTOST SERPL-MCNC: 18 NG/DL (ref 13–71)

## 2025-04-09 LAB
C TRACH DNA SPEC QL NAA+PROBE: NEGATIVE
N GONORRHOEA DNA SPEC QL NAA+PROBE: NEGATIVE

## 2025-04-10 LAB
LAB AP GYN PRIMARY INTERPRETATION: NORMAL
Lab: NORMAL

## (undated) DEVICE — BETHLEHEM UNIVERSAL MINOR VAG: Brand: CARDINAL HEALTH

## (undated) DEVICE — NEEDLE SPINAL 22G X 3.5IN  QUINCKE

## (undated) DEVICE — SMOKE EVACUATION TUBING WITH 7/8 IN TO 1/4 IN REDUCER: Brand: BUFFALO FILTER

## (undated) DEVICE — SUT VICRYL 3-0 SH 27 IN J416H

## (undated) DEVICE — STERILE 8 INCH PROCTO SWAB: Brand: CARDINAL HEALTH

## (undated) DEVICE — SPECIMEN CONTAINER STERILE PEEL PACK

## (undated) DEVICE — GLOVE INDICATOR PI UNDERGLOVE SZ 6.5 BLUE

## (undated) DEVICE — Device

## (undated) DEVICE — DRAPE EQUIPMENT RF WAND

## (undated) DEVICE — GLOVE PI ULTRA TOUCH SZ.6.5

## (undated) DEVICE — ELECTRODE BALL E-Z CLEAN 5 IN -0009

## (undated) DEVICE — PREMIUM DRY TRAY LF: Brand: MEDLINE INDUSTRIES, INC.

## (undated) DEVICE — NEEDLE COUNTER LG W/RULER

## (undated) DEVICE — VIAL DECANTER

## (undated) DEVICE — SCD SEQUENTIAL COMPRESSION COMFORT SLEEVE MEDIUM KNEE LENGTH: Brand: KENDALL SCD

## (undated) DEVICE — PENCIL ELECTROSURG E-Z CLEAN -0035H

## (undated) DEVICE — SUT SILK 2-0 SH 30 IN K833H

## (undated) DEVICE — SYRINGE 10ML LL CONTROL TOP

## (undated) DEVICE — PVC URETHRAL CATHETER: Brand: DOVER